# Patient Record
Sex: MALE | Race: WHITE | Employment: OTHER | URBAN - METROPOLITAN AREA
[De-identification: names, ages, dates, MRNs, and addresses within clinical notes are randomized per-mention and may not be internally consistent; named-entity substitution may affect disease eponyms.]

---

## 2020-06-26 ENCOUNTER — HOSPITAL ENCOUNTER (INPATIENT)
Facility: HOSPITAL | Age: 75
LOS: 7 days | Discharge: NURSING, REHAB OR LONG-TERM CARE | DRG: 069 | End: 2020-07-03
Attending: EMERGENCY MEDICINE | Admitting: HOSPITALIST
Payer: MEDICARE

## 2020-06-26 ENCOUNTER — APPOINTMENT (OUTPATIENT)
Dept: CT IMAGING | Facility: HOSPITAL | Age: 75
DRG: 069 | End: 2020-06-26
Attending: EMERGENCY MEDICINE
Payer: MEDICARE

## 2020-06-26 ENCOUNTER — APPOINTMENT (OUTPATIENT)
Dept: GENERAL RADIOLOGY | Facility: HOSPITAL | Age: 75
DRG: 069 | End: 2020-06-26
Attending: INTERNAL MEDICINE
Payer: MEDICARE

## 2020-06-26 ENCOUNTER — APPOINTMENT (OUTPATIENT)
Dept: ULTRASOUND IMAGING | Facility: HOSPITAL | Age: 75
DRG: 069 | End: 2020-06-26
Attending: EMERGENCY MEDICINE
Payer: MEDICARE

## 2020-06-26 DIAGNOSIS — I63.9 ACUTE CVA (CEREBROVASCULAR ACCIDENT) (HCC): Primary | ICD-10-CM

## 2020-06-26 DIAGNOSIS — R77.8 ELEVATED TROPONIN: ICD-10-CM

## 2020-06-26 PROBLEM — R79.89 ELEVATED TROPONIN: Status: ACTIVE | Noted: 2020-06-26

## 2020-06-26 LAB
ANION GAP SERPL CALC-SCNC: 2 MMOL/L (ref 0–18)
ANION GAP SERPL CALC-SCNC: 6 MMOL/L (ref 0–18)
APTT PPP: 32.8 SECONDS (ref 23.2–35.3)
BASOPHILS # BLD AUTO: 0.03 X10(3) UL (ref 0–0.2)
BASOPHILS NFR BLD AUTO: 0.5 %
BUN BLD-MCNC: 24 MG/DL (ref 7–18)
BUN BLD-MCNC: 24 MG/DL (ref 7–18)
BUN/CREAT SERPL: 14.4 (ref 10–20)
BUN/CREAT SERPL: 15.6 (ref 10–20)
CALCIUM BLD-MCNC: 8.8 MG/DL (ref 8.5–10.1)
CALCIUM BLD-MCNC: 9.1 MG/DL (ref 8.5–10.1)
CHLORIDE SERPL-SCNC: 102 MMOL/L (ref 98–112)
CHLORIDE SERPL-SCNC: 103 MMOL/L (ref 98–112)
CHOLEST SMN-MCNC: 75 MG/DL (ref ?–200)
CO2 SERPL-SCNC: 32 MMOL/L (ref 21–32)
CO2 SERPL-SCNC: 36 MMOL/L (ref 21–32)
CREAT BLD-MCNC: 1.54 MG/DL (ref 0.7–1.3)
CREAT BLD-MCNC: 1.67 MG/DL (ref 0.7–1.3)
DEPRECATED RDW RBC AUTO: 64.7 FL (ref 35.1–46.3)
DEPRECATED RDW RBC AUTO: 66.4 FL (ref 35.1–46.3)
EOSINOPHIL # BLD AUTO: 0.37 X10(3) UL (ref 0–0.7)
EOSINOPHIL NFR BLD AUTO: 5.7 %
ERYTHROCYTE [DISTWIDTH] IN BLOOD BY AUTOMATED COUNT: 18.1 % (ref 11–15)
ERYTHROCYTE [DISTWIDTH] IN BLOOD BY AUTOMATED COUNT: 18.4 % (ref 11–15)
EST. AVERAGE GLUCOSE BLD GHB EST-MCNC: 146 MG/DL (ref 68–126)
GLUCOSE BLD-MCNC: 127 MG/DL (ref 70–99)
GLUCOSE BLD-MCNC: 191 MG/DL (ref 70–99)
GLUCOSE BLDC GLUCOMTR-MCNC: 139 MG/DL (ref 70–99)
GLUCOSE BLDC GLUCOMTR-MCNC: 153 MG/DL (ref 70–99)
GLUCOSE BLDC GLUCOMTR-MCNC: 186 MG/DL (ref 70–99)
HAV IGM SER QL: 2.2 MG/DL (ref 1.6–2.6)
HBA1C MFR BLD HPLC: 6.7 % (ref ?–5.7)
HCT VFR BLD AUTO: 38.6 % (ref 39–53)
HCT VFR BLD AUTO: 40.8 % (ref 39–53)
HDLC SERPL-MCNC: 42 MG/DL (ref 40–59)
HGB BLD-MCNC: 12.2 G/DL (ref 13–17.5)
HGB BLD-MCNC: 12.6 G/DL (ref 13–17.5)
IMM GRANULOCYTES # BLD AUTO: 0.01 X10(3) UL (ref 0–1)
IMM GRANULOCYTES NFR BLD: 0.2 %
INR BLD: 1.29 (ref 0.9–1.2)
LDLC SERPL CALC-MCNC: 23 MG/DL (ref ?–100)
LYMPHOCYTES # BLD AUTO: 1.13 X10(3) UL (ref 1–4)
LYMPHOCYTES NFR BLD AUTO: 17.5 %
MCH RBC QN AUTO: 30.2 PG (ref 26–34)
MCH RBC QN AUTO: 30.7 PG (ref 26–34)
MCHC RBC AUTO-ENTMCNC: 30.9 G/DL (ref 31–37)
MCHC RBC AUTO-ENTMCNC: 31.6 G/DL (ref 31–37)
MCV RBC AUTO: 97 FL (ref 80–100)
MCV RBC AUTO: 97.8 FL (ref 80–100)
MONOCYTES # BLD AUTO: 0.61 X10(3) UL (ref 0.1–1)
MONOCYTES NFR BLD AUTO: 9.4 %
NEUTROPHILS # BLD AUTO: 4.32 X10 (3) UL (ref 1.5–7.7)
NEUTROPHILS # BLD AUTO: 4.32 X10(3) UL (ref 1.5–7.7)
NEUTROPHILS NFR BLD AUTO: 66.7 %
NONHDLC SERPL-MCNC: 33 MG/DL (ref ?–130)
OSMOLALITY SERPL CALC.SUM OF ELEC: 298 MOSM/KG (ref 275–295)
OSMOLALITY SERPL CALC.SUM OF ELEC: 299 MOSM/KG (ref 275–295)
PLATELET # BLD AUTO: 130 10(3)UL (ref 150–450)
PLATELET # BLD AUTO: 149 10(3)UL (ref 150–450)
PLATELET MORPHOLOGY: NORMAL
POTASSIUM SERPL-SCNC: 3.8 MMOL/L (ref 3.5–5.1)
POTASSIUM SERPL-SCNC: 4 MMOL/L (ref 3.5–5.1)
PROTHROMBIN TIME: 15.9 SECONDS (ref 11.8–14.5)
RBC # BLD AUTO: 3.98 X10(6)UL (ref 3.8–5.8)
RBC # BLD AUTO: 4.17 X10(6)UL (ref 3.8–5.8)
SARS-COV-2 RNA RESP QL NAA+PROBE: NOT DETECTED
SODIUM SERPL-SCNC: 140 MMOL/L (ref 136–145)
SODIUM SERPL-SCNC: 141 MMOL/L (ref 136–145)
TRIGL SERPL-MCNC: 49 MG/DL (ref 30–149)
TROPONIN I SERPL-MCNC: 0.05 NG/ML (ref ?–0.04)
TROPONIN I SERPL-MCNC: 0.05 NG/ML (ref ?–0.04)
TROPONIN I SERPL-MCNC: 0.07 NG/ML (ref ?–0.04)
VLDLC SERPL CALC-MCNC: 10 MG/DL (ref 0–30)
WBC # BLD AUTO: 6.5 X10(3) UL (ref 4–11)
WBC # BLD AUTO: 7.4 X10(3) UL (ref 4–11)

## 2020-06-26 PROCEDURE — 99223 1ST HOSP IP/OBS HIGH 75: CPT | Performed by: HOSPITALIST

## 2020-06-26 PROCEDURE — 71045 X-RAY EXAM CHEST 1 VIEW: CPT | Performed by: INTERNAL MEDICINE

## 2020-06-26 PROCEDURE — 70496 CT ANGIOGRAPHY HEAD: CPT | Performed by: EMERGENCY MEDICINE

## 2020-06-26 PROCEDURE — 93971 EXTREMITY STUDY: CPT | Performed by: EMERGENCY MEDICINE

## 2020-06-26 PROCEDURE — 70450 CT HEAD/BRAIN W/O DYE: CPT | Performed by: EMERGENCY MEDICINE

## 2020-06-26 PROCEDURE — 70498 CT ANGIOGRAPHY NECK: CPT | Performed by: EMERGENCY MEDICINE

## 2020-06-26 RX ORDER — ATORVASTATIN CALCIUM 40 MG/1
80 TABLET, FILM COATED ORAL NIGHTLY
Status: DISCONTINUED | OUTPATIENT
Start: 2020-06-26 | End: 2020-07-03

## 2020-06-26 RX ORDER — CLOPIDOGREL BISULFATE 75 MG/1
75 TABLET ORAL DAILY
Status: DISCONTINUED | OUTPATIENT
Start: 2020-06-27 | End: 2020-07-03

## 2020-06-26 RX ORDER — GABAPENTIN 300 MG/1
300 CAPSULE ORAL 2 TIMES DAILY
Status: ON HOLD | COMMUNITY
End: 2020-07-03

## 2020-06-26 RX ORDER — ASPIRIN 81 MG/1
324 TABLET, CHEWABLE ORAL ONCE
Status: DISCONTINUED | OUTPATIENT
Start: 2020-06-26 | End: 2020-06-26

## 2020-06-26 RX ORDER — ASPIRIN 300 MG
300 SUPPOSITORY, RECTAL RECTAL DAILY
Status: DISCONTINUED | OUTPATIENT
Start: 2020-06-26 | End: 2020-06-26

## 2020-06-26 RX ORDER — HEPARIN SODIUM 1000 [USP'U]/ML
INJECTION, SOLUTION INTRAVENOUS; SUBCUTANEOUS
Status: DISPENSED
Start: 2020-06-26 | End: 2020-06-27

## 2020-06-26 RX ORDER — HEPARIN SODIUM AND DEXTROSE 10000; 5 [USP'U]/100ML; G/100ML
INJECTION INTRAVENOUS CONTINUOUS
Status: DISCONTINUED | OUTPATIENT
Start: 2020-06-27 | End: 2020-06-29

## 2020-06-26 RX ORDER — METOCLOPRAMIDE HYDROCHLORIDE 5 MG/ML
5 INJECTION INTRAMUSCULAR; INTRAVENOUS EVERY 8 HOURS PRN
Status: DISCONTINUED | OUTPATIENT
Start: 2020-06-26 | End: 2020-07-03

## 2020-06-26 RX ORDER — ACETAMINOPHEN 160 MG/5ML
325 SOLUTION ORAL EVERY 6 HOURS PRN
Status: DISCONTINUED | OUTPATIENT
Start: 2020-06-26 | End: 2020-07-03

## 2020-06-26 RX ORDER — POLYETHYLENE GLYCOL 3350 17 G/17G
17 POWDER, FOR SOLUTION ORAL DAILY PRN
Status: DISCONTINUED | OUTPATIENT
Start: 2020-06-26 | End: 2020-07-03

## 2020-06-26 RX ORDER — GABAPENTIN 600 MG/1
600 TABLET ORAL NIGHTLY
Status: DISCONTINUED | OUTPATIENT
Start: 2020-06-26 | End: 2020-06-30

## 2020-06-26 RX ORDER — ASCORBIC ACID 500 MG
500 TABLET ORAL DAILY
COMMUNITY

## 2020-06-26 RX ORDER — MIRTAZAPINE 15 MG/1
7.5 TABLET, FILM COATED ORAL NIGHTLY
COMMUNITY

## 2020-06-26 RX ORDER — CETIRIZINE HYDROCHLORIDE 10 MG/1
10 TABLET ORAL DAILY
COMMUNITY

## 2020-06-26 RX ORDER — ACETAMINOPHEN 650 MG/1
650 SUPPOSITORY RECTAL EVERY 4 HOURS PRN
Status: DISCONTINUED | OUTPATIENT
Start: 2020-06-26 | End: 2020-07-03

## 2020-06-26 RX ORDER — ACETAMINOPHEN 160 MG/5ML
325 SUSPENSION ORAL EVERY 6 HOURS PRN
COMMUNITY

## 2020-06-26 RX ORDER — CETIRIZINE HYDROCHLORIDE 10 MG/1
10 TABLET ORAL DAILY
Status: DISCONTINUED | OUTPATIENT
Start: 2020-06-26 | End: 2020-07-02

## 2020-06-26 RX ORDER — FUROSEMIDE 20 MG/1
20 TABLET ORAL 2 TIMES DAILY
Status: ON HOLD | COMMUNITY
End: 2020-07-03

## 2020-06-26 RX ORDER — ASCORBIC ACID 500 MG
500 TABLET ORAL DAILY
Status: DISCONTINUED | OUTPATIENT
Start: 2020-06-26 | End: 2020-07-03

## 2020-06-26 RX ORDER — HEPARIN SODIUM 10000 [USP'U]/100ML
INJECTION, SOLUTION INTRAVENOUS
Status: DISPENSED
Start: 2020-06-26 | End: 2020-06-27

## 2020-06-26 RX ORDER — ACETAMINOPHEN 325 MG/1
650 TABLET ORAL EVERY 6 HOURS PRN
Status: DISCONTINUED | OUTPATIENT
Start: 2020-06-26 | End: 2020-07-03

## 2020-06-26 RX ORDER — GUAIFENESIN 100 MG/5ML
1200 SYRUP ORAL 2 TIMES DAILY
COMMUNITY

## 2020-06-26 RX ORDER — ONDANSETRON 2 MG/ML
4 INJECTION INTRAMUSCULAR; INTRAVENOUS EVERY 6 HOURS PRN
Status: DISCONTINUED | OUTPATIENT
Start: 2020-06-26 | End: 2020-07-03

## 2020-06-26 RX ORDER — CHOLECALCIFEROL (VITAMIN D3) 125 MCG
5 CAPSULE ORAL NIGHTLY
COMMUNITY

## 2020-06-26 RX ORDER — MELATONIN
325
Status: DISCONTINUED | OUTPATIENT
Start: 2020-06-27 | End: 2020-07-02

## 2020-06-26 RX ORDER — CLOPIDOGREL BISULFATE 75 MG/1
75 TABLET ORAL DAILY
COMMUNITY

## 2020-06-26 RX ORDER — DOCUSATE SODIUM 100 MG/1
100 CAPSULE, LIQUID FILLED ORAL 2 TIMES DAILY PRN
Status: DISCONTINUED | OUTPATIENT
Start: 2020-06-26 | End: 2020-07-03

## 2020-06-26 RX ORDER — ASPIRIN 325 MG
325 TABLET ORAL DAILY
Status: DISCONTINUED | OUTPATIENT
Start: 2020-06-26 | End: 2020-06-26

## 2020-06-26 RX ORDER — MIRTAZAPINE 15 MG/1
7.5 TABLET, FILM COATED ORAL NIGHTLY
Status: DISCONTINUED | OUTPATIENT
Start: 2020-06-26 | End: 2020-07-03

## 2020-06-26 RX ORDER — ACETAMINOPHEN 325 MG/1
325 TABLET ORAL EVERY 6 HOURS PRN
COMMUNITY

## 2020-06-26 RX ORDER — FUROSEMIDE 40 MG/1
40 TABLET ORAL 2 TIMES DAILY
Status: ON HOLD | COMMUNITY
End: 2020-07-03

## 2020-06-26 RX ORDER — MORPHINE SULFATE 2 MG/ML
INJECTION, SOLUTION INTRAMUSCULAR; INTRAVENOUS
Status: COMPLETED
Start: 2020-06-26 | End: 2020-06-26

## 2020-06-26 RX ORDER — HEPARIN SODIUM AND DEXTROSE 10000; 5 [USP'U]/100ML; G/100ML
1000 INJECTION INTRAVENOUS ONCE
Status: COMPLETED | OUTPATIENT
Start: 2020-06-26 | End: 2020-06-26

## 2020-06-26 RX ORDER — METOCLOPRAMIDE HYDROCHLORIDE 5 MG/ML
10 INJECTION INTRAMUSCULAR; INTRAVENOUS EVERY 8 HOURS PRN
Status: DISCONTINUED | OUTPATIENT
Start: 2020-06-26 | End: 2020-06-27

## 2020-06-26 RX ORDER — HEPARIN SODIUM 1000 [USP'U]/ML
5000 INJECTION, SOLUTION INTRAVENOUS; SUBCUTANEOUS ONCE
Status: COMPLETED | OUTPATIENT
Start: 2020-06-26 | End: 2020-06-26

## 2020-06-26 RX ORDER — PREGABALIN 50 MG/1
100 CAPSULE ORAL 3 TIMES DAILY
Status: DISCONTINUED | OUTPATIENT
Start: 2020-06-26 | End: 2020-06-30

## 2020-06-26 RX ORDER — LABETALOL HYDROCHLORIDE 5 MG/ML
10 INJECTION, SOLUTION INTRAVENOUS EVERY 10 MIN PRN
Status: DISCONTINUED | OUTPATIENT
Start: 2020-06-26 | End: 2020-07-03

## 2020-06-26 RX ORDER — NITROGLYCERIN 0.4 MG/1
TABLET SUBLINGUAL
Status: COMPLETED
Start: 2020-06-26 | End: 2020-06-26

## 2020-06-26 RX ORDER — DOCUSATE SODIUM 100 MG/1
100 CAPSULE, LIQUID FILLED ORAL 2 TIMES DAILY
COMMUNITY

## 2020-06-26 RX ORDER — DEXTROSE MONOHYDRATE 25 G/50ML
50 INJECTION, SOLUTION INTRAVENOUS
Status: DISCONTINUED | OUTPATIENT
Start: 2020-06-26 | End: 2020-07-03

## 2020-06-26 RX ORDER — SODIUM PHOSPHATE, DIBASIC AND SODIUM PHOSPHATE, MONOBASIC 7; 19 G/133ML; G/133ML
1 ENEMA RECTAL ONCE AS NEEDED
Status: DISCONTINUED | OUTPATIENT
Start: 2020-06-26 | End: 2020-07-03

## 2020-06-26 RX ORDER — ACETAMINOPHEN 325 MG/1
650 TABLET ORAL EVERY 4 HOURS PRN
Status: DISCONTINUED | OUTPATIENT
Start: 2020-06-26 | End: 2020-07-03

## 2020-06-26 RX ORDER — MIDODRINE HYDROCHLORIDE 5 MG/1
5 TABLET ORAL 3 TIMES DAILY
Status: DISCONTINUED | OUTPATIENT
Start: 2020-06-26 | End: 2020-07-01

## 2020-06-26 RX ORDER — MELATONIN
325
COMMUNITY

## 2020-06-26 RX ORDER — GABAPENTIN 300 MG/1
300 CAPSULE ORAL
Status: DISCONTINUED | OUTPATIENT
Start: 2020-06-26 | End: 2020-06-30

## 2020-06-26 RX ORDER — ASPIRIN 81 MG/1
81 TABLET ORAL DAILY
Status: DISCONTINUED | OUTPATIENT
Start: 2020-06-27 | End: 2020-06-29

## 2020-06-26 RX ORDER — GABAPENTIN 600 MG/1
600 TABLET ORAL NIGHTLY
Status: ON HOLD | COMMUNITY
End: 2020-07-03

## 2020-06-26 RX ORDER — BISACODYL 10 MG
10 SUPPOSITORY, RECTAL RECTAL
Status: DISCONTINUED | OUTPATIENT
Start: 2020-06-26 | End: 2020-07-03

## 2020-06-26 RX ORDER — PREGABALIN 100 MG/1
100 CAPSULE ORAL 3 TIMES DAILY
Status: ON HOLD | COMMUNITY
End: 2020-07-03

## 2020-06-26 RX ORDER — ATORVASTATIN CALCIUM 80 MG/1
80 TABLET, FILM COATED ORAL NIGHTLY
COMMUNITY

## 2020-06-26 RX ORDER — ACETAMINOPHEN 160 MG/5ML
15 SUSPENSION ORAL EVERY 4 HOURS PRN
Status: ON HOLD | COMMUNITY
End: 2020-07-03

## 2020-06-26 RX ORDER — MIDODRINE HYDROCHLORIDE 5 MG/1
5 TABLET ORAL 3 TIMES DAILY
Status: ON HOLD | COMMUNITY
End: 2020-07-03

## 2020-06-26 RX ORDER — SODIUM CHLORIDE 0.9 % (FLUSH) 0.9 %
3 SYRINGE (ML) INJECTION AS NEEDED
Status: DISCONTINUED | OUTPATIENT
Start: 2020-06-26 | End: 2020-07-03

## 2020-06-26 RX ORDER — DOCUSATE SODIUM 100 MG/1
100 CAPSULE, LIQUID FILLED ORAL 2 TIMES DAILY
Status: DISCONTINUED | OUTPATIENT
Start: 2020-06-26 | End: 2020-07-03

## 2020-06-26 RX ORDER — MONTELUKAST SODIUM 10 MG/1
10 TABLET ORAL DAILY
Status: DISCONTINUED | OUTPATIENT
Start: 2020-06-26 | End: 2020-07-03

## 2020-06-26 RX ORDER — MONTELUKAST SODIUM 10 MG/1
10 TABLET ORAL DAILY
COMMUNITY

## 2020-06-26 RX ORDER — GUAIFENESIN 600 MG
1200 TABLET, EXTENDED RELEASE 12 HR ORAL 2 TIMES DAILY
Status: DISCONTINUED | OUTPATIENT
Start: 2020-06-26 | End: 2020-07-03

## 2020-06-26 NOTE — PROGRESS NOTES
ED charted patient \"passed\" dysphagia screening; however, patient has significant left facial droop an increased slurred speech. Per protocol, patient automatically FAILS dysphagia screening and is NPO until speech assesses patient.   Attending paged and

## 2020-06-26 NOTE — ED PROVIDER NOTES
Patient Seen in: [facility] Emergency Department    History   Patient presents with:  Stroke    Stated Complaint: stroke unknown Kaitlin Rosen[facility]     HPI    68 yo M with PMH DM, HTN, CAD s/p PCI x9 and s/p PPM placement secondary to ?afib, ? RLE/LUE unknown LKW villa scal [facility]  Other systems are as noted in HPI. Constitutional and vital signs reviewed. All other systems reviewed and negative except as noted above. PSFH elements reviewed from today and agreed except as otherwise stated in HPI. GREEN   RAINBOW DRAW GOLD   CBC W/ DIFFERENTIAL     EKG    Rate, intervals and axes as noted on EKG Report.   Rate: 73  Rhythm: paced  Reading: paced 73 without Sgarbossa change without prior for comparison as interpreted by myself            EKG (2hr)    R infarction, or significant atrophy. BRAINSTEM: No edema, hemorrhage, mass, acute infarction, or significant atrophy. CALVARIUM: No apparent fracture, mass, or other significant visible lesion.   SINUSES: Limited views demonstrate no significant mucosal th dissection. Trace left carotid bifurcation atherosclerosis; partial retropharyngeal course of the left internal carotid artery with a mid cervical segment tonsillar loop. VERTEBRAL ARTERIES: RIGHT: No hemodynamically significant stenosis or dissection. findings as above. This report was called immediately at 1328 hours to the emergency room and discussed with Dr. Junaid Bradford.     Remote - PS  Dictated by (CST): Desiree Willoughby MD on 6/26/2020 at 1:16 PM     Finalized by (CST): Desiree Willoughby MD on 6/26/2020 are no discharge medications for this patient.

## 2020-06-26 NOTE — H&P
[facility]    PATIENT'S NAME: Francis Hernandez   ATTENDING PHYSICIAN: Leonarda Jean Baptiste MD   PATIENT ACCOUNT#:   [de-identified]    LOCATION:  [place] [address]  MEDICAL RECORD #:   [id number]       YOB: 1945  ADMISSION DATE: hypertension; hyperlipidemia.   He had history of DVT of left subclavian vein and then another DVT a few months ago to the right lower extremity per the patient's report, currently anticoagulated with Eliquis; history of mild to moderate peripheral arterial reactive. NECK:  Supple. No lymphadenopathy. Trachea midline. Full range of motion. LUNGS:  Clear to auscultation bilaterally. Normal respiratory effort. No intercostal retractions. HEART:  Regular rate and rhythm. S1, S2 auscultated. No murmur.

## 2020-06-27 ENCOUNTER — APPOINTMENT (OUTPATIENT)
Dept: CV DIAGNOSTICS | Facility: HOSPITAL | Age: 75
DRG: 069 | End: 2020-06-27
Attending: HOSPITALIST
Payer: MEDICARE

## 2020-06-27 ENCOUNTER — APPOINTMENT (OUTPATIENT)
Dept: GENERAL RADIOLOGY | Facility: HOSPITAL | Age: 75
DRG: 069 | End: 2020-06-27
Attending: HOSPITALIST
Payer: MEDICARE

## 2020-06-27 LAB
ANION GAP SERPL CALC-SCNC: 4 MMOL/L (ref 0–18)
APTT PPP: 28.1 SECONDS (ref 23.2–35.3)
APTT PPP: 39.5 SECONDS (ref 23.2–35.3)
APTT PPP: 46.8 SECONDS (ref 23.2–35.3)
APTT PPP: 83.2 SECONDS (ref 23.2–35.3)
BASOPHILS # BLD AUTO: 0.03 X10(3) UL (ref 0–0.2)
BASOPHILS NFR BLD AUTO: 0.4 %
BUN BLD-MCNC: 23 MG/DL (ref 7–18)
BUN/CREAT SERPL: 12.8 (ref 10–20)
CALCIUM BLD-MCNC: 9.4 MG/DL (ref 8.5–10.1)
CHLORIDE SERPL-SCNC: 103 MMOL/L (ref 98–112)
CO2 SERPL-SCNC: 37 MMOL/L (ref 21–32)
CREAT BLD-MCNC: 1.8 MG/DL (ref 0.7–1.3)
DEPRECATED RDW RBC AUTO: 66.2 FL (ref 35.1–46.3)
EOSINOPHIL # BLD AUTO: 0.51 X10(3) UL (ref 0–0.7)
EOSINOPHIL NFR BLD AUTO: 7.6 %
ERYTHROCYTE [DISTWIDTH] IN BLOOD BY AUTOMATED COUNT: 18.3 % (ref 11–15)
GLUCOSE BLD-MCNC: 101 MG/DL (ref 70–99)
GLUCOSE BLDC GLUCOMTR-MCNC: 116 MG/DL (ref 70–99)
GLUCOSE BLDC GLUCOMTR-MCNC: 178 MG/DL (ref 70–99)
GLUCOSE BLDC GLUCOMTR-MCNC: 218 MG/DL (ref 70–99)
GLUCOSE BLDC GLUCOMTR-MCNC: 96 MG/DL (ref 70–99)
HCT VFR BLD AUTO: 39.6 % (ref 39–53)
HGB BLD-MCNC: 12.3 G/DL (ref 13–17.5)
IMM GRANULOCYTES # BLD AUTO: 0.02 X10(3) UL (ref 0–1)
IMM GRANULOCYTES NFR BLD: 0.3 %
LYMPHOCYTES # BLD AUTO: 1.43 X10(3) UL (ref 1–4)
LYMPHOCYTES NFR BLD AUTO: 21.3 %
MCH RBC QN AUTO: 30.4 PG (ref 26–34)
MCHC RBC AUTO-ENTMCNC: 31.1 G/DL (ref 31–37)
MCV RBC AUTO: 97.8 FL (ref 80–100)
MONOCYTES # BLD AUTO: 0.7 X10(3) UL (ref 0.1–1)
MONOCYTES NFR BLD AUTO: 10.4 %
NEUTROPHILS # BLD AUTO: 4.01 X10 (3) UL (ref 1.5–7.7)
NEUTROPHILS # BLD AUTO: 4.01 X10(3) UL (ref 1.5–7.7)
NEUTROPHILS NFR BLD AUTO: 60 %
OSMOLALITY SERPL CALC.SUM OF ELEC: 302 MOSM/KG (ref 275–295)
PLATELET # BLD AUTO: 128 10(3)UL (ref 150–450)
PLATELET # BLD AUTO: 128 10(3)UL (ref 150–450)
POTASSIUM SERPL-SCNC: 4.3 MMOL/L (ref 3.5–5.1)
POTASSIUM SERPL-SCNC: 5.9 MMOL/L (ref 3.5–5.1)
RBC # BLD AUTO: 4.05 X10(6)UL (ref 3.8–5.8)
SODIUM SERPL-SCNC: 144 MMOL/L (ref 136–145)
T4 FREE SERPL-MCNC: 0.8 NG/DL (ref 0.8–1.7)
TROPONIN I SERPL-MCNC: 0.06 NG/ML (ref ?–0.04)
TSI SER-ACNC: 7.08 MIU/ML (ref 0.36–3.74)
WBC # BLD AUTO: 6.7 X10(3) UL (ref 4–11)

## 2020-06-27 PROCEDURE — 99223 1ST HOSP IP/OBS HIGH 75: CPT | Performed by: OTHER

## 2020-06-27 PROCEDURE — 99233 SBSQ HOSP IP/OBS HIGH 50: CPT | Performed by: HOSPITALIST

## 2020-06-27 PROCEDURE — 93306 TTE W/DOPPLER COMPLETE: CPT | Performed by: HOSPITALIST

## 2020-06-27 PROCEDURE — 74230 X-RAY XM SWLNG FUNCJ C+: CPT | Performed by: HOSPITALIST

## 2020-06-27 NOTE — PLAN OF CARE
Problem: Patient Centered Care  Goal: Patient preferences are identified and integrated in the patient's plan of care  Description  Interventions:  - What would you like us to know as we care for you?  I live at [facility].   - Provide timely, compl arrhythmias or at baseline  Description  INTERVENTIONS:  - Continuous cardiac monitoring, monitor vital signs, obtain 12 lead EKG if indicated  - Evaluate effectiveness of antiarrhythmic and heart rate control medications as ordered  - Initiate emergency m Encourage small bites of food and small sips of liquid  - Offer pills one at a time, crush or deliver with applesauce as needed  - Discontinue feeding and notify MD (or speech pathologist) if coughing or persistent throat clearing or wet/gurgly vocal quali

## 2020-06-27 NOTE — PLAN OF CARE
Post midnight f/u    C/p Elevated trop  -flat trend  -heparin gtt  -echo pending    Left facial droop- possible CVA  -per neuro     Chronic HFpEF last echo ef 50% mod MR  -repeat echo pending  -continue po lasix home dose     CAD  -Hx PCI LCx 2018   -asa,

## 2020-06-27 NOTE — CONSULTS
CARDIOLOGY SERVICE CONSULT      Patient: Chen Jalloh Date: 2020     : 1945 Attending: Rosa Bearden MD   69year old male Requested by: Dr. Zoya Liang     A/P:  Chest pain/NSTEMI  L facial droop/CVA  Chronic HFpEF, LVEF 50%  Moderate Ayleen. The patient was given SL NTG after which the pain improved and was intermediate in severity. The patient had an angiogram at [place] in 3/2019 that showed patent stents. In 11/2018 he underwent PCI to the mid and distal LCx.      History reviewe systems is otherwise negative.     Medications/Infusions:  Scheduled:   • morphINE sulfate (PF)       • Heparin Sod (Porcine) in D5W       • heparin sodium       • Insulin Aspart Pen  1-7 Units Subcutaneous TID CC   • atorvastatin  80 mg Oral Nightly   • ca [P.O.:236]  Out: -     Intake/Output Summary (Last 24 hours) at 6/26/2020 2327  Last data filed at 6/26/2020 1813  Gross per 24 hour   Intake 236 ml   Output —   Net 236 ml       Physical Assessment:  Gen: alert, comfortable  HEENT: MMM  Neck: supple  Hear diameter.     CONCLUSIONS    Mild left ventricular enlargement.     Anterior septum.     Abnormal motion of the ventricular septum.     Mild right ventricular enlargement.     Moderate to severe mitral regurgitation.     Mild aortic regurgitation.     Smal

## 2020-06-27 NOTE — CM/SW NOTE
[facility]. SW attempted to speak w/ pt but he was on his way to a test at the time. Pt provided permission for SW to contact pt's dtr, Aicha Mills, if she was not working. SW contacted pt's dtr, Aicha Mills.  She confirmed that pt lives at [facility]

## 2020-06-27 NOTE — PROGRESS NOTES
[place] [facility]    Progress Note    [address] Patient Status:  Inpatient    1945 MRN [id number]   Location [facility] 3W/SW Attending Kosta Cantu MD   Hosp Day # 1 PCP [facility]       Subjective:     Pt no hemithorax. Increased opacity in left retrocardiac region which may be related to technical limitations and patient rotation, atelectasis, pneumonia and or scarring. 2. Mild cardiomegaly. 3. Loop recorder. 4. Atherosclerotic calcification aorta.  5. Mary Ellen Bronaugh PM     Finalized by (CST): Kathryn Santiago MD on 6/26/2020 at 1:31 PM          Ekg 12-lead    Result Date: 6/26/2020  ECG Report  Interpretation  -------------------------- Electronic ventricular pacemaker Pacemaker ECG, No further analysis INSUFFICIENT DA status:  Full     >35 minutes spent    Abby Escobar MD  6/27/2020

## 2020-06-27 NOTE — SLP NOTE
ADULT SWALLOWING EVALUATION    ASSESSMENT    ASSESSMENT/OVERALL IMPRESSION:      PPE REQUIRED. THIS SLP WORE GLOVES AND DROPLET MASK. HANDS SANITIZED/WASHED UPON ENTRANCE/EXIT. This BSE was ordered d/t stroke protocol.  Pt currently on solid/thin liqui reflex of solid functional in strength. Mastication rotary slightly uncoordinated with increased effort. Minimal oral retention on the (L) cleared with cued liquid wash.      Pharyngeal response appeared -1-2 sec delayed per hyolaryngeal elevation to comple Admission: Regular; Thin liquids(G-tube \"minimal use\" per MD notes.  )  Precautions: Aspiration    Patient/Family Goals: least restrictive diet    SWALLOWING HISTORY  Current Diet Consistency: Regular; Thin liquids      OBJECTIVE   ORAL MOTOR EXAMINATION

## 2020-06-27 NOTE — OCCUPATIONAL THERAPY NOTE
OT orders received, chart reviewed, yaquelin attempted this AM however pt off floor. Will re-attempt later as able.

## 2020-06-27 NOTE — PHYSICAL THERAPY NOTE
PHYSICAL THERAPY EVALUATION - INPATIENT     Room Number: 329/329-A  Evaluation Date: 6/27/2020  Type of Evaluation: Initial   Physician Order: PT Eval and Treat    Presenting Problem: cva  Reason for Therapy: Mobility Dysfunction and Discharge Planning Medical History  History reviewed. No pertinent past medical history. Past Surgical History  History reviewed. No pertinent surgical history.     HOME SITUATION  Type of Home: Independent living facility   Home Layout: One level                Lives With patient currently have. ..  -   Turning over in bed (including adjusting bedclothes, sheets and blankets)?: None   -   Sitting down on and standing up from a chair with arms (e.g., wheelchair, bedside commode, etc.): A Little   -   Moving from lying on back activity/exercise instructions provided to patient in preparation for discharge.    Goal #5   Current Status    Goal #6    Goal #6  Current Status

## 2020-06-27 NOTE — PROGRESS NOTES
RRT    *See RRT Documentation Record*    Reason the RRT was called: Chest pressure with radiating left arm pain  Assessment of patient leading up to RRT: While assessing patient he began to complain of chest pressure that increasingly became worse, eventua

## 2020-06-27 NOTE — OCCUPATIONAL THERAPY NOTE
OCCUPATIONAL THERAPY EVALUATION - INPATIENT     Room Number: 329/329-A  Evaluation Date: 6/27/2020  Type of Evaluation: Initial  Presenting Problem: CVA    Physician Order: IP Consult to Occupational Therapy  Reason for Therapy: ADL/IADL Dysfunction and [place] techniques;ADL training;IADL training;Functional transfer training;UE strengthening/ROM; Endurance training;Patient/Family education;Patient/Family training;Fine motor coordination activities       OCCUPATIONAL THERAPY MEDICAL/SOCIAL HISTORY     Problem Lis side; L impaired      NEUROLOGICAL FINDINGS  Neurological Findings: Coordination - Finger to Nose;Coordination - Finger Opposition  Coordination - Finger to Nose: Left decreased speed;Left decreased accuracy     Coordination - Finger Opposition: Left decre mod I  Comment:    Patient will complete L UE AROM exercises, 10 reps x 2 sets in all planes      Goals  on: 2020  Frequency: 5x/wk

## 2020-06-27 NOTE — PROGRESS NOTES
Rapid response: ICU nocturnal physician evaluation–    The patient is 16-year-old male with history of coronary artery disease status post coronary artery stenting in 2018 of the LAD and the circumflex.   The patient has a history of cardiomyopathy with eje require nitroglycerin drip, will keep on floor temporarily as long as the patient remains hemodynamically stable, stat troponins and will follow clinically. Cardiology will come into the facility to evaluate the patient promptly.     Brandon Libman, MD

## 2020-06-27 NOTE — SLP NOTE
SPEECH/LANGUAGE/COGNITIVE EVALUATION - INPATIENT    Admission Date: 6/26/2020  Evaluation Date: 06/27/20    Reason for Referral: Stroke protocol    ASSESSMENT & PLAN   ASSESSMENT & IMPRESSION      PPE REQUIRED. THIS SLP WORE GLOVES, GOWN AND DROPLET MASK. use trained speech compensatory strategies (slow rate, exaggerated articulation, pausing between words) during repetition of 5-6 word functional sentences with 90% accuracy within 4 sessions.     In Progress   Goal #4 The patient will use trained speech com

## 2020-06-27 NOTE — CONSULTS
Neurology Inpatient Consult Note    Mehrdad Henderson : 1945   Referring Physician: Dr. Tadeo Dear  HPI:     Mehrdad  is a 69year old male who is being seen in neurologic evaluation.     Patient being seen in evaluation for sensation diminished on left to V1-3, face with left central facial weakness, hearing intact, moderate dysarthria  Motor: Full strength in right arm and leg, left arm and leg 4+/5 power; large amplitude jerky tremor of both arms, more pronounced on left si Hypokinesis of the basalinferior myocardium. Features are     consistent with a pseudonormal left ventricular filling pattern,     with concomitant abnormal relaxation and increased filling     pressure (grade 2 diastolic dysfunction).   2. Aortic valve: goggles were worn by myself, and patient wore mask as well. Very stringent hand hygiene practiced before, during, after visit. All instruments used were thoroughly cleaned.

## 2020-06-27 NOTE — SLP NOTE
ADULT VIDEOFLUOROSCOPIC SWALLOWING STUDY    Admission Date: 6/26/2020  Evaluation Date: 06/27/20  Radiologist: Dr. Farfan Person    PPE REQUIRED. THIS SLP WORE GOWN, GLOVES, GOGGLES, AND DROPLET MASK. HANDS SANITIZED/WASHED UPON ENTRANCE/EXIT.         PLAN: Speec protocol      Family/Patient Goals: least restrictive diet       ASSESSMENT   DYSPHAGIA ASSESSMENT  Test completed in conjunction with Radiologist.  Patient Positioned: Upright;Midline. Patient Viewed: Lateral.  Patient Alertness: Fully alert.   Consistenc of spill-over of retention into airway on this posture. However, Pt is at risk to aspirate the pharyngeal retention, which is noted be be reduced with swallowing compensatory strategies; however not entirely clearing.  Pt understands risks with oral intake [id number] [facility]  #[id number]

## 2020-06-28 LAB
ANION GAP SERPL CALC-SCNC: 4 MMOL/L (ref 0–18)
APTT PPP: 56.2 SECONDS (ref 23.2–35.3)
BASOPHILS # BLD AUTO: 0.02 X10(3) UL (ref 0–0.2)
BASOPHILS NFR BLD AUTO: 0.3 %
BUN BLD-MCNC: 29 MG/DL (ref 7–18)
BUN/CREAT SERPL: 17.2 (ref 10–20)
CALCIUM BLD-MCNC: 8.4 MG/DL (ref 8.5–10.1)
CHLORIDE SERPL-SCNC: 101 MMOL/L (ref 98–112)
CO2 SERPL-SCNC: 35 MMOL/L (ref 21–32)
CREAT BLD-MCNC: 1.69 MG/DL (ref 0.7–1.3)
DEPRECATED RDW RBC AUTO: 65.4 FL (ref 35.1–46.3)
EOSINOPHIL # BLD AUTO: 0.43 X10(3) UL (ref 0–0.7)
EOSINOPHIL NFR BLD AUTO: 6.3 %
ERYTHROCYTE [DISTWIDTH] IN BLOOD BY AUTOMATED COUNT: 18.2 % (ref 11–15)
GLUCOSE BLD-MCNC: 108 MG/DL (ref 70–99)
GLUCOSE BLDC GLUCOMTR-MCNC: 104 MG/DL (ref 70–99)
GLUCOSE BLDC GLUCOMTR-MCNC: 139 MG/DL (ref 70–99)
GLUCOSE BLDC GLUCOMTR-MCNC: 143 MG/DL (ref 70–99)
GLUCOSE BLDC GLUCOMTR-MCNC: 158 MG/DL (ref 70–99)
HCT VFR BLD AUTO: 37.6 % (ref 39–53)
HGB BLD-MCNC: 11.9 G/DL (ref 13–17.5)
IMM GRANULOCYTES # BLD AUTO: 0.02 X10(3) UL (ref 0–1)
IMM GRANULOCYTES NFR BLD: 0.3 %
LYMPHOCYTES # BLD AUTO: 1.4 X10(3) UL (ref 1–4)
LYMPHOCYTES NFR BLD AUTO: 20.5 %
MCH RBC QN AUTO: 31 PG (ref 26–34)
MCHC RBC AUTO-ENTMCNC: 31.6 G/DL (ref 31–37)
MCV RBC AUTO: 97.9 FL (ref 80–100)
MONOCYTES # BLD AUTO: 0.76 X10(3) UL (ref 0.1–1)
MONOCYTES NFR BLD AUTO: 11.1 %
NEUTROPHILS # BLD AUTO: 4.21 X10 (3) UL (ref 1.5–7.7)
NEUTROPHILS # BLD AUTO: 4.21 X10(3) UL (ref 1.5–7.7)
NEUTROPHILS NFR BLD AUTO: 61.5 %
OSMOLALITY SERPL CALC.SUM OF ELEC: 296 MOSM/KG (ref 275–295)
PLATELET # BLD AUTO: 127 10(3)UL (ref 150–450)
PLATELET # BLD AUTO: 127 10(3)UL (ref 150–450)
POTASSIUM SERPL-SCNC: 4.3 MMOL/L (ref 3.5–5.1)
RBC # BLD AUTO: 3.84 X10(6)UL (ref 3.8–5.8)
SODIUM SERPL-SCNC: 140 MMOL/L (ref 136–145)
WBC # BLD AUTO: 6.8 X10(3) UL (ref 4–11)

## 2020-06-28 PROCEDURE — 99232 SBSQ HOSP IP/OBS MODERATE 35: CPT | Performed by: OTHER

## 2020-06-28 PROCEDURE — 99233 SBSQ HOSP IP/OBS HIGH 50: CPT | Performed by: HOSPITALIST

## 2020-06-28 RX ORDER — CHLORHEXIDINE GLUCONATE 4 G/100ML
30 SOLUTION TOPICAL
Status: COMPLETED | OUTPATIENT
Start: 2020-06-29 | End: 2020-06-29

## 2020-06-28 RX ORDER — SODIUM CHLORIDE 9 MG/ML
INJECTION, SOLUTION INTRAVENOUS
Status: COMPLETED | OUTPATIENT
Start: 2020-06-29 | End: 2020-06-29

## 2020-06-28 NOTE — PROGRESS NOTES
[place] [facility] - [facility]    Progress Note    [address] Patient Status:  Inpatient    1945 MRN [id number]   Location [facility] 3W/SW Attending Rod Celestin MD   Hosp Day # 2 PCP Dolores Richter         Assessment and Plan normal respiratory effort  CV: Heart with regular rate and rhythm, Nl C1,L6 , 2/6 systolic ejection murmur  Abd: Abdomen soft, nontender, nondistended, bowel sounds present  Ext:  no clubbing, no cyanosis,no edema  Neuro: Facial droop  Skin: no rashes or l definite evidence of aspiration. Please see speech pathology notes for further details.     Dictated by (CST): Simon Zhang MD on 6/27/2020 at 11:50 AM     Finalized by (CST): Simon Zhang MD on 6/27/2020 at 11:51 AM          Xr Chest Ap Portable airways infection/aspiration. 6. Dilatation of the main pulmonary artery trunk may relate to underlying pulmonary hypertension. 7. Lesser incidental findings as above.   This report was called immediately at 1328 hours to the emergency room and discussed wi

## 2020-06-28 NOTE — PROGRESS NOTES
[facility]    Progress Note    Roberth Rhodes Patient Status:  Inpatient    1945 MRN [id number]   Location [facility] 3W/SW Attending Roxann Serrano, [address] [facility] Se Day # 2 PCP Bertha Cleary       Subjective:     Yuliya Erazo and patient rotation, atelectasis, pneumonia and or scarring. 2. Mild cardiomegaly. 3. Loop recorder. 4. Atherosclerotic calcification aorta. 5. Partially visualized G-tube.     Dictated by (CST): Nohelia Avelar MD on 6/27/2020 at 9:16 AM     Finalized by ( Date: 6/26/2020  ECG Report  Interpretation  -------------------------- Electronic ventricular pacemaker Pacemaker ECG, No further analysis INSUFFICIENT DATA When compared with ECG of 06/26/2020 14:54:44 No significant changes have occurred Electronically Michaelle Chen MD  6/28/2020

## 2020-06-28 NOTE — PHYSICAL THERAPY NOTE
PHYSICAL THERAPY TREATMENT NOTE - INPATIENT     Room Number: 329/329-A       Presenting Problem: cva    Problem List  Principal Problem:    Acute CVA (cerebrovascular accident) [facility])  Active Problems:    Elevated troponin      PHYSICAL THERAPY ASSESSMENT Static Sitting: Good  Dynamic Sitting: Fair +           Static Standing: Fair  Dynamic Standing: Fair -    ACTIVITY TOLERANCE                         O2 WALK  SPO2 on Room Air at Rest: 96  SPO2 Ambulation on Room A 150 feet with assist device: walker - rolling at assistance level: supervision   Goal #3   Current Status 150 feet with RW SBA   Goal #4    Goal #4   Current Status    Goal #5 Patient to demonstrate independence with home activity/exercise instructions pro

## 2020-06-28 NOTE — PLAN OF CARE
No acute neuro changes noted. Heparin gtt infusing - pending PTT results.  Brain MRI in AM.     Problem: Patient Centered Care  Goal: Patient preferences are identified and integrated in the patient's plan of care  Description  Interventions:  - What would edema, trend weights  Outcome: Progressing  Goal: Absence of cardiac arrhythmias or at baseline  Description  INTERVENTIONS:  - Continuous cardiac monitoring, monitor vital signs, obtain 12 lead EKG if indicated  - Evaluate effectiveness of antiarrhythmic small sips of liquid  - Offer pills one at a time, crush or deliver with applesauce as needed  - Discontinue feeding and notify MD (or speech pathologist) if coughing or persistent throat clearing or wet/gurgly vocal quality is noted  Outcome: Progressing

## 2020-06-28 NOTE — PLAN OF CARE
Patient is a/ox4. 1L O2 nasal cannula. Up with standby assist with the walker. Heparin drip at 11.5 ml/hr. Daily NIH. Accucheck ACHS. Plan is for a brain MRI and cath tomorrow. Consent signed. Will continue to monitor.     Problem: Patient Centered Care  Go temperature  - Assess for signs of decreased coronary artery perfusion - ex.  Angina  - Evaluate fluid balance, assess for edema, trend weights  Outcome: Progressing  Goal: Absence of cardiac arrhythmias or at baseline  Description  INTERVENTIONS:  - Contin Progressing     Problem: Impaired Swallowing  Goal: Minimize aspiration risk  Description  Interventions:  - Patient should be alert and upright for all feedings (90 degrees preferred)  - Offer food and liquids at a slow rate  - No straws  - Encourage smal

## 2020-06-28 NOTE — SLP NOTE
SPEECH DAILY NOTE - INPATIENT    ASSESSMENT & PLAN   ASSESSMENT  Pt seen for meal assess/dysphagia therapy to monitor po tolerance of recommended diet and ensure adherence to aspiration precautions. Pt alert and sitting upright in chair.   Pt agreeable to success  In Progress   Goal #3 The patient will complete dysphagia exercises with 90% accuracy within 3 sessions with mild cues.     Did not target this session  In Progress       FOLLOW UP  Follow Up Needed: Yes  SLP Follow-up Date: 06/29/20  Number of Vis

## 2020-06-28 NOTE — PROGRESS NOTES
Neurology Inpatient Follow-up Note      HPI:     Patient being seen in follow up. Interval notes and workup reviewed.     Patient feels the same today continues to maintain that left-sided weakness is baseline to some degree, from previous strokes, but sym 70     –Patient on full anticoagulation     –delirium precautions:  Minimize sedating medications, increase activity / up in chair during day, minimize interruptions at night, re-orienting cues (e.g. clocks, calendars), familiar staff when possible     [facility]

## 2020-06-29 ENCOUNTER — APPOINTMENT (OUTPATIENT)
Dept: INTERVENTIONAL RADIOLOGY/VASCULAR | Facility: HOSPITAL | Age: 75
DRG: 069 | End: 2020-06-29
Attending: NURSE PRACTITIONER
Payer: MEDICARE

## 2020-06-29 LAB
ANION GAP SERPL CALC-SCNC: 6 MMOL/L (ref 0–18)
APTT PPP: 58.5 SECONDS (ref 23.2–35.3)
BASOPHILS # BLD AUTO: 0.03 X10(3) UL (ref 0–0.2)
BASOPHILS NFR BLD AUTO: 0.4 %
BUN BLD-MCNC: 42 MG/DL (ref 7–18)
BUN/CREAT SERPL: 19.4 (ref 10–20)
CALCIUM BLD-MCNC: 8.9 MG/DL (ref 8.5–10.1)
CHLORIDE SERPL-SCNC: 102 MMOL/L (ref 98–112)
CO2 SERPL-SCNC: 30 MMOL/L (ref 21–32)
CREAT BLD-MCNC: 2.17 MG/DL (ref 0.7–1.3)
DEPRECATED RDW RBC AUTO: 64 FL (ref 35.1–46.3)
EOSINOPHIL # BLD AUTO: 0.25 X10(3) UL (ref 0–0.7)
EOSINOPHIL NFR BLD AUTO: 3.7 %
ERYTHROCYTE [DISTWIDTH] IN BLOOD BY AUTOMATED COUNT: 18.3 % (ref 11–15)
GLUCOSE BLD-MCNC: 151 MG/DL (ref 70–99)
GLUCOSE BLDC GLUCOMTR-MCNC: 106 MG/DL (ref 70–99)
GLUCOSE BLDC GLUCOMTR-MCNC: 118 MG/DL (ref 70–99)
GLUCOSE BLDC GLUCOMTR-MCNC: 164 MG/DL (ref 70–99)
GLUCOSE BLDC GLUCOMTR-MCNC: 95 MG/DL (ref 70–99)
HCT VFR BLD AUTO: 38.5 % (ref 39–53)
HGB BLD-MCNC: 12.3 G/DL (ref 13–17.5)
IMM GRANULOCYTES # BLD AUTO: 0.01 X10(3) UL (ref 0–1)
IMM GRANULOCYTES NFR BLD: 0.1 %
LYMPHOCYTES # BLD AUTO: 1.59 X10(3) UL (ref 1–4)
LYMPHOCYTES NFR BLD AUTO: 23.5 %
MCH RBC QN AUTO: 30.5 PG (ref 26–34)
MCHC RBC AUTO-ENTMCNC: 31.9 G/DL (ref 31–37)
MCV RBC AUTO: 95.5 FL (ref 80–100)
MONOCYTES # BLD AUTO: 0.74 X10(3) UL (ref 0.1–1)
MONOCYTES NFR BLD AUTO: 10.9 %
NEUTROPHILS # BLD AUTO: 4.14 X10 (3) UL (ref 1.5–7.7)
NEUTROPHILS # BLD AUTO: 4.14 X10(3) UL (ref 1.5–7.7)
NEUTROPHILS NFR BLD AUTO: 61.4 %
OSMOLALITY SERPL CALC.SUM OF ELEC: 299 MOSM/KG (ref 275–295)
PLATELET # BLD AUTO: 130 10(3)UL (ref 150–450)
PLATELET # BLD AUTO: 153 10(3)UL (ref 150–450)
RBC # BLD AUTO: 4.03 X10(6)UL (ref 3.8–5.8)
SODIUM SERPL-SCNC: 138 MMOL/L (ref 136–145)
WBC # BLD AUTO: 6.8 X10(3) UL (ref 4–11)

## 2020-06-29 PROCEDURE — B2111ZZ FLUOROSCOPY OF MULTIPLE CORONARY ARTERIES USING LOW OSMOLAR CONTRAST: ICD-10-PCS | Performed by: INTERNAL MEDICINE

## 2020-06-29 PROCEDURE — 99232 SBSQ HOSP IP/OBS MODERATE 35: CPT | Performed by: OTHER

## 2020-06-29 PROCEDURE — 99233 SBSQ HOSP IP/OBS HIGH 50: CPT | Performed by: HOSPITALIST

## 2020-06-29 PROCEDURE — 4A023N7 MEASUREMENT OF CARDIAC SAMPLING AND PRESSURE, LEFT HEART, PERCUTANEOUS APPROACH: ICD-10-PCS | Performed by: INTERNAL MEDICINE

## 2020-06-29 RX ORDER — HYDROMORPHONE HYDROCHLORIDE 1 MG/ML
0.5 INJECTION, SOLUTION INTRAMUSCULAR; INTRAVENOUS; SUBCUTANEOUS EVERY 2 HOUR PRN
Status: DISCONTINUED | OUTPATIENT
Start: 2020-06-29 | End: 2020-07-03

## 2020-06-29 RX ORDER — ASPIRIN 81 MG/1
81 TABLET ORAL DAILY
Status: DISCONTINUED | OUTPATIENT
Start: 2020-06-29 | End: 2020-06-29

## 2020-06-29 RX ORDER — CLOPIDOGREL BISULFATE 75 MG/1
75 TABLET ORAL DAILY
Status: DISCONTINUED | OUTPATIENT
Start: 2020-06-29 | End: 2020-06-29

## 2020-06-29 RX ORDER — LIDOCAINE HYDROCHLORIDE 20 MG/ML
INJECTION, SOLUTION EPIDURAL; INFILTRATION; INTRACAUDAL; PERINEURAL
Status: COMPLETED
Start: 2020-06-29 | End: 2020-06-29

## 2020-06-29 RX ORDER — MIDAZOLAM HYDROCHLORIDE 1 MG/ML
INJECTION INTRAMUSCULAR; INTRAVENOUS
Status: COMPLETED
Start: 2020-06-29 | End: 2020-06-29

## 2020-06-29 RX ORDER — HYDROMORPHONE HYDROCHLORIDE 1 MG/ML
1 INJECTION, SOLUTION INTRAMUSCULAR; INTRAVENOUS; SUBCUTANEOUS ONCE
Status: COMPLETED | OUTPATIENT
Start: 2020-06-29 | End: 2020-06-29

## 2020-06-29 RX ORDER — HEPARIN SODIUM 5000 [USP'U]/ML
5000 INJECTION, SOLUTION INTRAVENOUS; SUBCUTANEOUS EVERY 8 HOURS SCHEDULED
Status: DISCONTINUED | OUTPATIENT
Start: 2020-06-29 | End: 2020-06-29

## 2020-06-29 NOTE — PROGRESS NOTES
Neurology Inpatient Follow-up Note      HPI:     Patient being seen in follow up. Interval notes and workup reviewed. Feeling about the same. Left side still weak. Tremors at the same.       Past Medical Hisotory:  Reviewed    Medications:  Reviewed possible     –Continue home dose carbidopa–levodopa 25–100, 2 tablets 4 times a day     –Pending MRI result, patient can be discharged today with follow-up with his outpatient neurologist      Neurology service will continue to follow.   Please page with an

## 2020-06-29 NOTE — PLAN OF CARE
Problem: Patient Centered Care  Goal: Patient preferences are identified and integrated in the patient's plan of care  Description  Interventions:  - What would you like us to know as we care for you?  I live at [facility].   - Provide timely, compl baseline  Description  INTERVENTIONS:  - Continuous cardiac monitoring, monitor vital signs, obtain 12 lead EKG if indicated  - Evaluate effectiveness of antiarrhythmic and heart rate control medications as ordered  - Initiate emergency measures for life t needed  - Discontinue feeding and notify MD (or speech pathologist) if coughing or persistent throat clearing or wet/gurgly vocal quality is noted  Outcome: Progressing     Problem: Impaired Communication  Goal: Patient will achieve maximal communication p

## 2020-06-29 NOTE — CONSULTS
PHYSICAL MEDICINE AND REHABILITATION CONSULTATION       Location [facility] 3W/SW Attending Christine Sal MD   Hosp Day # 3 PCP [address]     Patient Identification  Liset Martinez is a 69year old male.   :  1945  Admit Praneeth injection 100 mL, 100 mL, Injection, ONCE PRN  [COMPLETED] 0.9% NaCl infusion, , Intravenous, On Call  [COMPLETED] Chlorhexidine Gluconate (HIBICLENS) 4 % liquid 30 mL, 30 mL, Topical, On Call  [COMPLETED] iopamidol (ISOVUE-M) 76 % injection 100 mL, 100 mL tab 75 mg, 75 mg, Oral, Daily  docusate sodium (COLACE) cap 100 mg, 100 mg, Oral, BID  ferrous sulfate EC tab 325 mg, 325 mg, Oral, Daily with breakfast  furosemide (LASIX) tab 60 mg, 60 mg, Oral, BID  gabapentin (NEURONTIN) cap 300 mg, 300 mg, Oral, BID A diastolic left ventricular dysfunction with ejection fraction of 45%, last echocardiogram on record September 2019; history of atrial perico dysfunction with bradycardia and pauses, status post permanent pacemaker; benign prostatic hypertrophy; generalized functional Status:   Patient able to perform bed mobility, transfers, and ambulation with stand by assist using rolling walker. Increased gait distance noted with patient requiring cues to maintain stance within walker.  SpO2 84% on room air after ambulatio facial droop.  Dysarthria of speech, sensation intact to LT in BUE/BLE;   Data Review:    Lab Results   Component Value Date    .0 06/29/2020    PTT 58.5 06/29/2020    PGLU 118 06/29/2020             ASSESSMENT:    Deficits of self care and mobility

## 2020-06-29 NOTE — PAYOR COMM NOTE
--------------  ADMISSION REVIEW     Payor: [id number] #:  [id number]  Authorization Number: [id number]    Admit date: 6/26/20  Admit time: 56       Admitting Physician: Aric Ruvalcaba MD  Attending Physician:  Judy Jean extremity with 5/5 strength proximally and distally. ED Course     EKG    Rate, intervals and axes as noted on EKG Report.   Rate: 73  Rhythm: paced  Reading: paced 73 without Sgarbossa change without prior for comparison as interpreted by myself several days of left-sided facial droop associated with left arm/leg weakness, patient neither TPA nor neuro interventional candidate given duration of symptoms.   CT head nonacute, aspirin given prior to arrival.  CTA obtained for possible dissection in se pain/NSTEMI  L facial droop/CVA  Chronic HFpEF, LVEF 50%  Moderate to severe MR  CAD s/p PCI LCx  Symptomatic bradycardia s/p PPM  HTN  HL  DM2  CKD  PVD  DVT     Recommendations:  -Continue to trend troponins until downtrending or flat  -Given history and equal, round, and reactive to light; extraocular movements intact; facial sensation diminished on left to V1-3, face with left central facial weakness, hearing intact, moderate dysarthria  Motor: Full strength in right arm and leg, left arm and leg 4+/5 po 11.9  HCT 37.6  .0      CARDIOLOGY -     Assessment and Plan:   C/p Elevated trop  -Although troponin levels are flat history suggest increasing exertional chest pain and echo with EF of 40% with moderate MR and TR.   Angiogram in the past showed pat pain  NSTEMI  Hx of CAD and stents  - cardiology on consult  - probable cath tomorrow  - trops flat now  - cont heparin drip  - cont aspirin and plavix  - cont lipitor  - echo shows EF 40% with areas of hypokinesis    6/29/20 -   06/29/20 0858 98 °F (36.7

## 2020-06-29 NOTE — PROGRESS NOTES
[facility]    Progress Note    [address] Patient Status:  Inpatient    1945 MRN [id number]   Location [facility] 3W/SW Attending Carmelina Pappas MD   Hosp Day # 3 PCP [facility]       Subjective:     No ne thrombosis normally on Eliquis. - now off of heparin drip  - Eliquis on hold  - start prophylactic dose heparin now.     Will discuss anticoag with neurology.       Hx of HTN  - cont lasix     Hx of DM2  - cont SQ insulin regimen     Hx of HL  - lipitor

## 2020-06-29 NOTE — PROGRESS NOTES
Spoke with MRI techs and per [employer], patient's own nerve stimulator controller is needed at bedside. [employer] representative was contacted by MRI techs to come and prep pacemaker for MRI of the brain and also address nerve stimulator prior to MRI.

## 2020-06-29 NOTE — PLAN OF CARE
Patient scheduled for cardiac cath today. Consent is signed, and patient is prepped for procedure. Heparin gtt infusing as ordered. VS stable. C/o same mild chest pain. Neuro assessment unchanged. Fall precautions. Call light within reach.  Will continue to hematoma  - Assess quality of pulses, skin color and temperature  - Assess for signs of decreased coronary artery perfusion - ex.  Angina  - Evaluate fluid balance, assess for edema, trend weights  Outcome: Progressing  Goal: Absence of cardiac arrhythmias risk  Description  Interventions:  - Patient should be alert and upright for all feedings (90 degrees preferred)  - Offer food and liquids at a slow rate  - No straws  - Encourage small bites of food and small sips of liquid  - Offer pills one at a time, c

## 2020-06-29 NOTE — PROCEDURES
Cardiologist: Ciera Lake MD  Primary Proceduralist: Ciera Lake MD  Procedure Performed: [facility] and [facility]  Date of Procedure: 6/29/2020   Indication: Elevated troponin    Summary of findings: Moderate CAD with widely patent stent and proximal LAD.       P

## 2020-06-29 NOTE — SLP NOTE
SLP attempt this PM. Per RN Kamilla Prieto, pt is out of room for procedure. SLP to f/u as pt available/appropriate and/or as schedule allows. Please contact SLP with any questions, concerns, and/or change in status. Thank you. Stacey Angelo  Spe

## 2020-06-29 NOTE — IVS NOTE
S/p LHC with right groin access. Patient c/o left sided facial and neck pain before and after the procedure. Procedure hand off report given to Edu Winslow RN. Procedure site remains dry and intact with no signs and symptoms of bleeding and hematoma.  Bedrest m

## 2020-06-30 ENCOUNTER — APPOINTMENT (OUTPATIENT)
Dept: ULTRASOUND IMAGING | Facility: HOSPITAL | Age: 75
DRG: 069 | End: 2020-06-30
Attending: RADIOLOGY
Payer: MEDICARE

## 2020-06-30 ENCOUNTER — APPOINTMENT (OUTPATIENT)
Dept: MRI IMAGING | Facility: HOSPITAL | Age: 75
DRG: 069 | End: 2020-06-30
Attending: Other
Payer: MEDICARE

## 2020-06-30 ENCOUNTER — APPOINTMENT (OUTPATIENT)
Dept: GENERAL RADIOLOGY | Facility: HOSPITAL | Age: 75
DRG: 069 | End: 2020-06-30
Attending: HOSPITALIST
Payer: MEDICARE

## 2020-06-30 LAB
ANION GAP SERPL CALC-SCNC: 7 MMOL/L (ref 0–18)
APTT PPP: 32 SECONDS (ref 23.2–35.3)
BASOPHILS # BLD AUTO: 0.03 X10(3) UL (ref 0–0.2)
BASOPHILS NFR BLD AUTO: 0.4 %
BUN BLD-MCNC: 44 MG/DL (ref 7–18)
BUN/CREAT SERPL: 21.2 (ref 10–20)
CALCIUM BLD-MCNC: 9.3 MG/DL (ref 8.5–10.1)
CHLORIDE SERPL-SCNC: 100 MMOL/L (ref 98–112)
CO2 SERPL-SCNC: 33 MMOL/L (ref 21–32)
CREAT BLD-MCNC: 2.08 MG/DL (ref 0.7–1.3)
DEPRECATED RDW RBC AUTO: 64.5 FL (ref 35.1–46.3)
EOSINOPHIL # BLD AUTO: 0.16 X10(3) UL (ref 0–0.7)
EOSINOPHIL NFR BLD AUTO: 2.2 %
ERYTHROCYTE [DISTWIDTH] IN BLOOD BY AUTOMATED COUNT: 18.1 % (ref 11–15)
GLUCOSE BLD-MCNC: 115 MG/DL (ref 70–99)
GLUCOSE BLDC GLUCOMTR-MCNC: 146 MG/DL (ref 70–99)
GLUCOSE BLDC GLUCOMTR-MCNC: 178 MG/DL (ref 70–99)
GLUCOSE BLDC GLUCOMTR-MCNC: 185 MG/DL (ref 70–99)
GLUCOSE BLDC GLUCOMTR-MCNC: 221 MG/DL (ref 70–99)
HCT VFR BLD AUTO: 37.6 % (ref 39–53)
HGB BLD-MCNC: 11.9 G/DL (ref 13–17.5)
IMM GRANULOCYTES # BLD AUTO: 0.02 X10(3) UL (ref 0–1)
IMM GRANULOCYTES NFR BLD: 0.3 %
LYMPHOCYTES # BLD AUTO: 1.46 X10(3) UL (ref 1–4)
LYMPHOCYTES NFR BLD AUTO: 19.8 %
MCH RBC QN AUTO: 30.3 PG (ref 26–34)
MCHC RBC AUTO-ENTMCNC: 31.6 G/DL (ref 31–37)
MCV RBC AUTO: 95.7 FL (ref 80–100)
MONOCYTES # BLD AUTO: 0.84 X10(3) UL (ref 0.1–1)
MONOCYTES NFR BLD AUTO: 11.4 %
NEUTROPHILS # BLD AUTO: 4.88 X10 (3) UL (ref 1.5–7.7)
NEUTROPHILS # BLD AUTO: 4.88 X10(3) UL (ref 1.5–7.7)
NEUTROPHILS NFR BLD AUTO: 65.9 %
OSMOLALITY SERPL CALC.SUM OF ELEC: 302 MOSM/KG (ref 275–295)
PLATELET # BLD AUTO: 150 10(3)UL (ref 150–450)
POTASSIUM SERPL-SCNC: 4.4 MMOL/L (ref 3.5–5.1)
POTASSIUM SERPL-SCNC: 4.6 MMOL/L (ref 3.5–5.1)
RBC # BLD AUTO: 3.93 X10(6)UL (ref 3.8–5.8)
SODIUM SERPL-SCNC: 140 MMOL/L (ref 136–145)
WBC # BLD AUTO: 7.4 X10(3) UL (ref 4–11)

## 2020-06-30 PROCEDURE — 70551 MRI BRAIN STEM W/O DYE: CPT | Performed by: OTHER

## 2020-06-30 PROCEDURE — 93926 LOWER EXTREMITY STUDY: CPT | Performed by: RADIOLOGY

## 2020-06-30 PROCEDURE — 71045 X-RAY EXAM CHEST 1 VIEW: CPT | Performed by: HOSPITALIST

## 2020-06-30 PROCEDURE — 99233 SBSQ HOSP IP/OBS HIGH 50: CPT | Performed by: HOSPITALIST

## 2020-06-30 RX ORDER — PREGABALIN 75 MG/1
150 CAPSULE ORAL 3 TIMES DAILY
Status: DISCONTINUED | OUTPATIENT
Start: 2020-07-01 | End: 2020-07-03

## 2020-06-30 RX ORDER — GABAPENTIN 100 MG/1
100 CAPSULE ORAL 3 TIMES DAILY
Status: DISCONTINUED | OUTPATIENT
Start: 2020-06-30 | End: 2020-07-03

## 2020-06-30 RX ORDER — HYDROCODONE BITARTRATE AND ACETAMINOPHEN 5; 325 MG/1; MG/1
1 TABLET ORAL EVERY 6 HOURS PRN
Status: DISCONTINUED | OUTPATIENT
Start: 2020-06-30 | End: 2020-07-03

## 2020-06-30 NOTE — PROGRESS NOTES
[place] [facility]    Progress Note    [address] Ojeda Patient Status:  Inpatient    1945 MRN [id number]   Location [facility] 2W/SW Attending Aung Pastrana MD   Hosp Day # 4 PCP [facility]       Subjective:     No bl aspirin per cards. - cont lipitor  - echo shows EF 40% with areas of hypokinesis  - pt started on low dose metoprolol    Right groin site bleeding and hematoma last night. Eliquis held. No pseudoaneurysm on u/s today. Hgb stable.   - ok to resume Eliqui

## 2020-06-30 NOTE — CM/SW NOTE
Noted that PMR and PT/OT are recommending acute rehab at discharge. SW sent acute rehab referrals via [employer]. Awaiting on who is able to accept pt for rehab. Will need to provide daughter a list of accepted facilities when able.

## 2020-06-30 NOTE — PROGRESS NOTES
Neurology Inpatient interval note    Per discussion with patient and with Dr. Nessa Jimenez, patient will be seeing Dr. Nessa Jimenez as an inpatient from here on, as he is his outpatient neurologist; defer further neurologic recommendations to him.   Please page with an

## 2020-06-30 NOTE — PAYOR COMM NOTE
--------------  CONTINUED STAY REVIEW    Payor: [id number] [place] [address] #:  [id number]  Authorization Number: [id number]    Admit date: 6/26/20  Admit time: 56    Admitting Physician: Antonio Whitehead MD  Attending Physician:  Griselda Bee Moderate CAD with widely patent stent and proximal LAD    Post procedure findings:  1) Left Ventriculography at 30 degrees BENITEZ: Not performed, renal insufficiency  2) Hemodynamics: LVEDP: 16 mmHg, no gradient across aortic valve  3) Coronaries:  · Left albert consider in the future if blood pressure allows and renal function normalizes        TO MRI THIS AFTERNOON         PLEASE PROVIDE INPATIENT AUTHORIZATION. THANK YOU.

## 2020-06-30 NOTE — PROGRESS NOTES
Patient to transfer from PCCU 216  to 303. Report given to [facility]. Medications, labs, plan of care reviewed. All belongings with patient. Nursing provided patient with current list of medications; reviewed purpose and side effects of medications.  No fu

## 2020-06-30 NOTE — PHYSICAL THERAPY NOTE
Spoke with ALEJANDRO Dias who reports patient t/f to CCU due to new hematoma in right groin after cath when coughing, has been better today but basically in bed. She reports he is having an MRI shortly and requesting we hold for now and check back tomorrow.

## 2020-06-30 NOTE — IMAGING NOTE
[address] Quin[address] Yanez, [employer] pacemaker rep present. Pacemaker turned to MRI safe mode. Monitor applied. 1535  VS  HR 80  /73  SPO 93% MRI SCAN BEGINS    1545 VS  HR 81  /73  SPO2 93%    1555  VS  HR 80  /77  SPO2 95%  MRI SCAN COMPLETE.

## 2020-06-30 NOTE — PROGRESS NOTES
[place] [facility]    Progress Note    125 Common[address] Ojeda Patient Status:  Inpatient    1945 MRN [id number]   Location [facility] 2W/SW Attending Alli Levy MD   Hosp Day # 4 PCP Syed Kearney         Assessment and Plan intake/output data recorded.      Exam  Gen: No acute distress,   Psych:alert and oriented x3,   HEENT: normocephalic  Neck:supple,no JVD  Pulm: Lungs clear, normal respiratory effort  CV: Heart with regular rate and rhythm, Nl S1,S2 ,no S3 or murmur  Abd:

## 2020-06-30 NOTE — OCCUPATIONAL THERAPY NOTE
Patient t/f to CCU with new hematoma to R groin after cath lab from coughing (RRT 6/29); patient is having an MRI shortly and limiting OOB activities; will follow up with patient 7/1

## 2020-06-30 NOTE — PLAN OF CARE
Patient stable. Left sided weakness and facial droop persist with numbness and tingling. Patient had MRI- awaiting result. Right groin cath site intact, slightly swollen. No bleeding. Pulses palpable. Right Foot warm.  Patient c/o moderate pain to right jayshree to optimize hemodynamic stability  - Monitor arterial and/or venous puncture sites for bleeding and/or hematoma  - Assess quality of pulses, skin color and temperature  - Assess for signs of decreased coronary artery perfusion - ex.  Angina  - Evaluate flui tolerated functional activity level and precautions during self-care  - Encourage patient to incorporate impaired side during daily activities to promote function  Outcome: Progressing     Problem: Impaired Swallowing  Goal: Minimize aspiration risk  Descr Progressing

## 2020-06-30 NOTE — PROGRESS NOTES
Dr Ger Thomas / ICU on call   Responded to RRT   RRT called for acute and severe right groin pain developed while the patient was up to the chair after he coughed,   Patient had cardiac cath earlier today from the right groin  Patient now with a bulging hemato

## 2020-06-30 NOTE — PLAN OF CARE
RRT    *See RRT Documentation Record*    Reason the RRT was called: Rt.groin pain/swelling(pls.see RRT sheet for more details)  Assessment of patient leading up to RRT:Pt.was sitting on the recliner and c/o of severe  burning pain on the rt.groin after cou

## 2020-07-01 ENCOUNTER — APPOINTMENT (OUTPATIENT)
Dept: ULTRASOUND IMAGING | Facility: HOSPITAL | Age: 75
DRG: 069 | End: 2020-07-01
Attending: HOSPITALIST
Payer: MEDICARE

## 2020-07-01 ENCOUNTER — APPOINTMENT (OUTPATIENT)
Dept: CT IMAGING | Facility: HOSPITAL | Age: 75
DRG: 069 | End: 2020-07-01
Attending: HOSPITALIST
Payer: MEDICARE

## 2020-07-01 LAB
ANION GAP SERPL CALC-SCNC: 9 MMOL/L (ref 0–18)
BACTERIA UR QL AUTO: NEGATIVE /HPF
BASOPHILS # BLD: 0 X10(3) UL (ref 0–0.2)
BASOPHILS NFR BLD: 0 %
BILIRUB UR QL: NEGATIVE
BUN BLD-MCNC: 54 MG/DL (ref 7–18)
BUN/CREAT SERPL: 19.9 (ref 10–20)
CALCIUM BLD-MCNC: 8.7 MG/DL (ref 8.5–10.1)
CHLORIDE SERPL-SCNC: 98 MMOL/L (ref 98–112)
CO2 SERPL-SCNC: 32 MMOL/L (ref 21–32)
COLOR UR: YELLOW
CREAT BLD-MCNC: 2.71 MG/DL (ref 0.7–1.3)
CREAT UR-SCNC: 133 MG/DL
CREAT UR-SCNC: 133 MG/DL
DEPRECATED HBV CORE AB SER IA-ACNC: 82.4 NG/ML (ref 30–530)
DEPRECATED RDW RBC AUTO: 64.4 FL (ref 35.1–46.3)
EOSINOPHIL # BLD: 0 X10(3) UL (ref 0–0.7)
EOSINOPHIL NFR BLD: 0 %
ERYTHROCYTE [DISTWIDTH] IN BLOOD BY AUTOMATED COUNT: 18.4 % (ref 11–15)
GLUCOSE BLD-MCNC: 151 MG/DL (ref 70–99)
GLUCOSE BLDC GLUCOMTR-MCNC: 173 MG/DL (ref 70–99)
GLUCOSE BLDC GLUCOMTR-MCNC: 186 MG/DL (ref 70–99)
GLUCOSE BLDC GLUCOMTR-MCNC: 190 MG/DL (ref 70–99)
GLUCOSE BLDC GLUCOMTR-MCNC: 237 MG/DL (ref 70–99)
GLUCOSE UR-MCNC: NEGATIVE MG/DL
HCT VFR BLD AUTO: 30 % (ref 39–53)
HCT VFR BLD AUTO: 32.5 % (ref 39–53)
HGB BLD-MCNC: 10.4 G/DL (ref 13–17.5)
HGB BLD-MCNC: 9.5 G/DL (ref 13–17.5)
HGB UR QL STRIP.AUTO: NEGATIVE
IRON SATURATION: 8 % (ref 20–50)
IRON SERPL-MCNC: 25 UG/DL (ref 65–175)
LEUKOCYTE ESTERASE UR QL STRIP.AUTO: NEGATIVE
LYMPHOCYTES NFR BLD: 0.52 X10(3) UL (ref 1–4)
LYMPHOCYTES NFR BLD: 5 %
MCH RBC QN AUTO: 30.6 PG (ref 26–34)
MCHC RBC AUTO-ENTMCNC: 32 G/DL (ref 31–37)
MCV RBC AUTO: 95.6 FL (ref 80–100)
METAMYELOCYTES # BLD: 0.21 X10(3) UL
METAMYELOCYTES NFR BLD: 2 %
MICROALBUMIN UR-MCNC: 1.31 MG/DL
MICROALBUMIN/CREAT 24H UR-RTO: 9.8 UG/MG (ref ?–30)
MONOCYTES # BLD: 0.31 X10(3) UL (ref 0.1–1)
MONOCYTES NFR BLD: 3 %
NEUTROPHILS # BLD AUTO: 8.43 X10 (3) UL (ref 1.5–7.7)
NEUTROPHILS NFR BLD: 52 %
NEUTS BAND NFR BLD: 38 %
NEUTS HYPERSEG # BLD: 9.36 X10(3) UL (ref 1.5–7.7)
NITRITE UR QL STRIP.AUTO: NEGATIVE
OSMOLALITY SERPL CALC.SUM OF ELEC: 306 MOSM/KG (ref 275–295)
PH UR: 5 [PH] (ref 5–8)
PLATELET # BLD AUTO: 131 10(3)UL (ref 150–450)
PLATELET MORPHOLOGY: NORMAL
POTASSIUM SERPL-SCNC: 4.4 MMOL/L (ref 3.5–5.1)
PROT UR-MCNC: NEGATIVE MG/DL
RBC # BLD AUTO: 3.4 X10(6)UL (ref 3.8–5.8)
RBC #/AREA URNS AUTO: 2 /HPF
SODIUM SERPL-SCNC: 139 MMOL/L (ref 136–145)
SODIUM SERPL-SCNC: 30 MMOL/L
SP GR UR STRIP: 1.02 (ref 1–1.03)
TOTAL CELLS COUNTED: 100
TOTAL IRON BINDING CAPACITY: 332 UG/DL (ref 240–450)
TRANSFERRIN SERPL-MCNC: 223 MG/DL (ref 200–360)
UROBILINOGEN UR STRIP-ACNC: <2
WBC # BLD AUTO: 10.4 X10(3) UL (ref 4–11)
WBC #/AREA URNS AUTO: 1 /HPF

## 2020-07-01 PROCEDURE — 99223 1ST HOSP IP/OBS HIGH 75: CPT | Performed by: INTERNAL MEDICINE

## 2020-07-01 PROCEDURE — 99233 SBSQ HOSP IP/OBS HIGH 50: CPT | Performed by: HOSPITALIST

## 2020-07-01 PROCEDURE — 74176 CT ABD & PELVIS W/O CONTRAST: CPT | Performed by: HOSPITALIST

## 2020-07-01 PROCEDURE — 76775 US EXAM ABDO BACK WALL LIM: CPT | Performed by: HOSPITALIST

## 2020-07-01 RX ORDER — FUROSEMIDE 10 MG/ML
40 INJECTION INTRAMUSCULAR; INTRAVENOUS ONCE
Status: DISCONTINUED | OUTPATIENT
Start: 2020-07-01 | End: 2020-07-03

## 2020-07-01 RX ORDER — MIDODRINE HYDROCHLORIDE 5 MG/1
10 TABLET ORAL 3 TIMES DAILY
Status: DISCONTINUED | OUTPATIENT
Start: 2020-07-01 | End: 2020-07-03

## 2020-07-01 RX ORDER — FUROSEMIDE 10 MG/ML
INJECTION INTRAMUSCULAR; INTRAVENOUS
Status: DISPENSED
Start: 2020-07-01 | End: 2020-07-01

## 2020-07-01 NOTE — PAYOR COMM NOTE
--------------  CONTINUED STAY REVIEW    Payor: [address] #:  [id number]  Authorization Number: [id number]    Admit date: 6/26/20  Admit time: 56    Admitting Physician: Tim Shane MD  Attending Physician:  Javi Lara MD  Pr tomorrow     Orthostatic hypotension hx PD  We will monitor with extra fluids and increase Midodrine dose        ATTENDING -     Right groin angiogram site bleeding and large hematoma  -RLE arterial duplex - no pseudoaneurysm, AV fistula. Large hematoma.

## 2020-07-01 NOTE — PROGRESS NOTES
[facility]  Progress Note     Mehrdad [place]  : 1945    Status: Inpatient  Day #: 5    Attending: Benny Ruiz MD  PCP: [facility]      Assessment and Plan     Suspected acute CVA in distribution of right MCA  -CT negativ yesterday.      Objective     Temp:  [97.9 °F (36.6 °C)-99 °F (37.2 °C)] 98.4 °F (36.9 °C)  Pulse:  [73-85] 84  Resp:  [15-25] 20  BP: ()/(49-85) 94/51  General:  Alert, no distress  HEENT:  Normocephalic, atraumatic  Neck:  Supple, symmetrical  Cardi 32.0   *  --  127*  --  101*  --  108*  --  151*  --   --  115* 151*   MG  --  2.2  --   --   --   --   --   --   --   --   --   --   --    PTT 32.8  --   --    < > 83.2*   < > 56.2* 58.5*  --   --  32.0  --   --    INR 1.29*  --   --   --   --   -- guaiFENesin ER  1,200 mg Oral BID   • insulin detemir  5 Units Subcutaneous Nightly   • Umeclidinium Bromide  1 puff Inhalation Daily   • melatonin  5 mg Oral Nightly   • mirtazapine  7.5 mg Oral Nightly   • Montelukast Sodium  10 mg Oral Daily   • Vitamin

## 2020-07-01 NOTE — CM/SW NOTE
Case Management /Progression of Care    Confirmed patient choices for Acute Rehabilitation with patient and daughter Sergio Parson.    Alyssa Dial/Jonny Jones Authorization requested/ Pending in [place]    Case ID  [id number]    SW/CM to remain available for lombardi

## 2020-07-01 NOTE — PLAN OF CARE
Problem: Patient Centered Care  Goal: Patient preferences are identified and integrated in the patient's plan of care  Description  Interventions:  - What would you like us to know as we care for you?  I live at [facility].   - Provide timely, compl baseline  Description  INTERVENTIONS:  - Continuous cardiac monitoring, monitor vital signs, obtain 12 lead EKG if indicated  - Evaluate effectiveness of antiarrhythmic and heart rate control medications as ordered  - Initiate emergency measures for life t as needed  - Discontinue feeding and notify MD (or speech pathologist) if coughing or persistent throat clearing or wet/gurgly vocal quality is noted  Outcome: Progressing     Problem: Impaired Communication  Goal: Patient will achieve maximal communicatio

## 2020-07-01 NOTE — CONSULTS
06/30/20  1300 06/30/20  1400 06/30/20  1500 06/30/20  1600   BP: 104/64 113/85 116/74 136/77   Pulse: 73 76 76 74   Resp: 20 15 22 15   Temp:    98.3 °F (36.8 °C)   TempSrc:    Temporal   SpO2: 93% 94% 91% 93%   Weight:       Height:             Body mas try to simplify this

## 2020-07-01 NOTE — OCCUPATIONAL THERAPY NOTE
Pt not seen for OT at this time secondary to per nursing request, pt on hold for this date, and pt is off of floor for testing. Will follow up with pt next date as pt is appropriate.

## 2020-07-01 NOTE — SLP NOTE
SPEECH DAILY NOTE - INPATIENT    ASSESSMENT & PLAN   ASSESSMENT  Pt seen for meal assess/dysphagia therapy to monitor po tolerance of recommended diet and ensure adherence to aspiration precautions. Rn called because pt was coughing consistently with PO. with '100 % accuracy over 3 session(s). Consistent coughing with thin liquids. Recommend downgrade. New goal:    Pt will tolerate general diet with nectar thick liquids without overt signs of aspiration with 100% accuracy.   In Progress   Goal #2 The p

## 2020-07-01 NOTE — PROGRESS NOTES
RRT    *See RRT Documentation Record*    Reason the RRT was called: Patient blood pressure low, feeling dizzy and felt hands were weaker  Assessment of patient leading up to RRT: Patient got 500ml bolus, other vss. Blood pressure 70s and low 80s.  Patient h

## 2020-07-01 NOTE — PROGRESS NOTES
[facility]    Progress Note    125 Linda Ojeda Patient Status:  Inpatient    1945 MRN [id number]   Location [facility] 2W/SW Attending Ursula Locke MD   Hosp Day # 5 PCP Meredith Antonio         Assessment and Plan 7/1/2020 0628  Gross per 24 hour   Intake 430 ml   Output 350 ml   Net 80 ml      This shift: No intake/output data recorded.      Exam  Gen: No acute distress,   Psych:alert and oriented x3,   HEENT: normocephalic  Neck:supple,no JVD  Pulm: Lungs clear, no [facility] TERM ACUTE [facility]) 06/27/2020        Arterial Duplex Lower Extremity Right (cpt=93926)    Result Date: 6/30/2020  CONCLUSION:  1. Large hematoma. No pseudoaneurysm, arteriovenous fistula, or other vascular abnormality identified.     Dictated by (CST): Subha Rojo,

## 2020-07-02 LAB
ALBUMIN SERPL-MCNC: 3.1 G/DL (ref 3.4–5)
ALBUMIN/GLOB SERPL: 0.9 {RATIO} (ref 1–2)
ALP LIVER SERPL-CCNC: 110 U/L (ref 45–117)
ALT SERPL-CCNC: 8 U/L (ref 16–61)
ANION GAP SERPL CALC-SCNC: 4 MMOL/L (ref 0–18)
AST SERPL-CCNC: 13 U/L (ref 15–37)
BASOPHILS # BLD AUTO: 0.03 X10(3) UL (ref 0–0.2)
BASOPHILS NFR BLD AUTO: 0.2 %
BILIRUB SERPL-MCNC: 0.6 MG/DL (ref 0.1–2)
BUN BLD-MCNC: 61 MG/DL (ref 7–18)
BUN/CREAT SERPL: 25.4 (ref 10–20)
CALCIUM BLD-MCNC: 8.4 MG/DL (ref 8.5–10.1)
CHLORIDE SERPL-SCNC: 102 MMOL/L (ref 98–112)
CO2 SERPL-SCNC: 33 MMOL/L (ref 21–32)
CREAT BLD-MCNC: 2.4 MG/DL (ref 0.7–1.3)
DEPRECATED RDW RBC AUTO: 67.3 FL (ref 35.1–46.3)
EOSINOPHIL # BLD AUTO: 0.45 X10(3) UL (ref 0–0.7)
EOSINOPHIL NFR BLD AUTO: 3.3 %
ERYTHROCYTE [DISTWIDTH] IN BLOOD BY AUTOMATED COUNT: 18.6 % (ref 11–15)
GLOBULIN PLAS-MCNC: 3.4 G/DL (ref 2.8–4.4)
GLUCOSE BLD-MCNC: 146 MG/DL (ref 70–99)
GLUCOSE BLDC GLUCOMTR-MCNC: 147 MG/DL (ref 70–99)
GLUCOSE BLDC GLUCOMTR-MCNC: 183 MG/DL (ref 70–99)
GLUCOSE BLDC GLUCOMTR-MCNC: 262 MG/DL (ref 70–99)
GLUCOSE BLDC GLUCOMTR-MCNC: 297 MG/DL (ref 70–99)
HAV IGM SER QL: 2.2 MG/DL (ref 1.6–2.6)
HCT VFR BLD AUTO: 28.5 % (ref 39–53)
HGB BLD-MCNC: 8.9 G/DL (ref 13–17.5)
IMM GRANULOCYTES # BLD AUTO: 0.07 X10(3) UL (ref 0–1)
IMM GRANULOCYTES NFR BLD: 0.5 %
LYMPHOCYTES # BLD AUTO: 1.3 X10(3) UL (ref 1–4)
LYMPHOCYTES NFR BLD AUTO: 9.4 %
M PROTEIN MFR SERPL ELPH: 6.5 G/DL (ref 6.4–8.2)
MCH RBC QN AUTO: 30.5 PG (ref 26–34)
MCHC RBC AUTO-ENTMCNC: 31.2 G/DL (ref 31–37)
MCV RBC AUTO: 97.6 FL (ref 80–100)
MONOCYTES # BLD AUTO: 1.27 X10(3) UL (ref 0.1–1)
MONOCYTES NFR BLD AUTO: 9.2 %
NEUTROPHILS # BLD AUTO: 10.68 X10 (3) UL (ref 1.5–7.7)
NEUTROPHILS # BLD AUTO: 10.68 X10(3) UL (ref 1.5–7.7)
NEUTROPHILS NFR BLD AUTO: 77.4 %
OSMOLALITY SERPL CALC.SUM OF ELEC: 308 MOSM/KG (ref 275–295)
PHOSPHATE SERPL-MCNC: 4.4 MG/DL (ref 2.5–4.9)
PLATELET # BLD AUTO: 118 10(3)UL (ref 150–450)
POTASSIUM SERPL-SCNC: 4.4 MMOL/L (ref 3.5–5.1)
RBC # BLD AUTO: 2.92 X10(6)UL (ref 3.8–5.8)
SODIUM SERPL-SCNC: 139 MMOL/L (ref 136–145)
WBC # BLD AUTO: 13.8 X10(3) UL (ref 4–11)

## 2020-07-02 PROCEDURE — 99233 SBSQ HOSP IP/OBS HIGH 50: CPT | Performed by: HOSPITALIST

## 2020-07-02 PROCEDURE — 99233 SBSQ HOSP IP/OBS HIGH 50: CPT | Performed by: INTERNAL MEDICINE

## 2020-07-02 RX ORDER — FINASTERIDE 5 MG/1
5 TABLET, FILM COATED ORAL DAILY
Status: DISCONTINUED | OUTPATIENT
Start: 2020-07-02 | End: 2020-07-03

## 2020-07-02 RX ORDER — CETIRIZINE HYDROCHLORIDE 5 MG/1
5 TABLET ORAL DAILY
Status: DISCONTINUED | OUTPATIENT
Start: 2020-07-03 | End: 2020-07-03

## 2020-07-02 NOTE — PLAN OF CARE
Problem: Patient Centered Care  Goal: Patient preferences are identified and integrated in the patient's plan of care  Description  Interventions:  - What would you like us to know as we care for you?  I live at [facility].   - Provide timely, compl baseline  Description  INTERVENTIONS:  - Continuous cardiac monitoring, monitor vital signs, obtain 12 lead EKG if indicated  - Evaluate effectiveness of antiarrhythmic and heart rate control medications as ordered  - Initiate emergency measures for life t feedings (90 degrees preferred)  - Offer food and liquids at a slow rate  - No straws  - Encourage small bites of food and small sips of liquid  - Offer pills one at a time, crush or deliver with applesauce as needed  - Discontinue feeding and notify MD (o Plan for [facility] at discharge when medically cleared.

## 2020-07-02 NOTE — PLAN OF CARE
Problem: Patient Centered Care  Goal: Patient preferences are identified and integrated in the patient's plan of care  Description  Interventions:  - What would you like us to know as we care for you?  I live at [facility].   - Provide timely, compl baseline  Description  INTERVENTIONS:  - Continuous cardiac monitoring, monitor vital signs, obtain 12 lead EKG if indicated  - Evaluate effectiveness of antiarrhythmic and heart rate control medications as ordered  - Initiate emergency measures for life t as needed  - Discontinue feeding and notify MD (or speech pathologist) if coughing or persistent throat clearing or wet/gurgly vocal quality is noted  Outcome: Progressing     Problem: Impaired Communication  Goal: Patient will achieve maximal communicatio

## 2020-07-02 NOTE — PAYOR COMM NOTE
--------------  CONTINUED STAY REVIEW    Payor: [address] #:  [id number]  Authorization Number: [id number]    Admit date: 6/26/20  Admit time: 56    Admitting Physician: Ivana Harmon MD  Attending Physician:  Wallis Lombard, MD  Pr US KIDNEYS   CONCLUSION:   1. Moderate right renal atrophy , and mild left renal atrophy. No hydronephrosis. Cortical thinning bilaterally. Normal echogenicity. Normal urinary bladder. CARDIOLOGY -     Resting comfortably in bed.  Still has right  Normal echogenicity  - no indication for renal US with doppler   - avoid nephrotoxins   - phos wnl  - bladder scan at bedside - <20 cc  - check orthostatics - defer flomax  - will start proscar          INSURANCE PENDING FOR ACUTE REHAB        APPROVED TO

## 2020-07-02 NOTE — OCCUPATIONAL THERAPY NOTE
OCCUPATIONAL THERAPY TREATMENT NOTE - INPATIENT        Room Number: 583/794-O           Presenting Problem: CVA    Problem List  Principal Problem:    Acute CVA (cerebrovascular accident) ([address]  Active Problems:    Elevated troponin    ARSLAN (acute kidney in aware)  Management Techniques: Relaxation;Repositioning     ACTIVITY TOLERANCE                         O2 SATURATIONS                ACTIVITIES OF DAILY LIVING ASSESSMENT  AM-PAC ‘6-Clicks’ Inpatient Daily Activity Short Form  How much help from another pe toileting with mod I  Comment: NT    Patient will tolerate standing for 7 minutes in prep for adls with mod I   Comment: pt tolerated standing 4 min with CGA , RW for support    Patient will complete item retrieval with mod I  Comment: CGA with short ambul

## 2020-07-02 NOTE — CM/SW NOTE
Case Management/ Progression of Care    Insurance Authorization is received  for [facility] acute rehabilitation , once patient is medically cleared.      [facility]   Authorization  number    [id number]    PLAN: Chase Vasquez Acute rehabiliation  Pending med

## 2020-07-02 NOTE — PROGRESS NOTES
[facility]  Progress Note     Michelle Piña  : 1945    Status: Inpatient  Day #: 6    Attending: Beronica Anderson MD  PCP: [facility]      Assessment and Plan     Suspected acute CVA in distribution of right MCA  -L weakness today.     Objective     Temp:  [97.3 °F (36.3 °C)-98 °F (36.7 °C)] 97.3 °F (36.3 °C)  Pulse:  [69-70] 70  Resp:  [20] 20  BP: ()/(51-70) 96/58  General:  Alert, no distress  HEENT:  Normocephalic, atraumatic  Neck:  Supple, symmetrical  Cardiac:  Re CO2 36.0*  --  32.0  --  37.0*  --  35.0*  --    < >  --  33.0* 32.0 33.0*   *  --  127*  --  101*  --  108*  --    < >  --  115* 151* 146*   MG  --  2.2  --   --   --   --   --   --   --   --   --   --  2.2   PHOS  --   --   --   --   --   --   - (cpt=71045)    Result Date: 7/1/2020  CONCLUSION:  1. Cardiomegaly. Tortuous thoracic aorta. 2. Extensive bilateral mixed alveolar and interstitial parenchymal abnormality most pronounced at the left base with accompanying left-sided effusion.   Bilateral

## 2020-07-02 NOTE — SLP NOTE
SPEECH DAILY NOTE - INPATIENT    ASSESSMENT & PLAN   ASSESSMENT  SLP f/u for ongoing dysphagia tx and meal assessment per recommendations of regular/nectar thick liquids per downgrade on 7/1.  RN reports pt tolerates diet and medication well with no overt c Finalized by (CST): Beto Ferrer MD on 7/01/2020 at 6:39 AM        Diet Recommendations - Solids: Regular  Diet Recommendations - Liquid: Nectar thick    Compensatory Strategies Recommended: No straws; Slow rate; Alternate consistencies;Small bites tolerate trials of thin liquids without overt signs/symptoms of aspiration with 90% accuracy over 2 sessions.  In Progress       SPEECH GOALS  Goal #1 The patient will complete OMEs targeting Lingual, Labial and Buccal strength, ROM, and coordination with 9

## 2020-07-02 NOTE — PROGRESS NOTES
[facility] Pharmacy Note:  Renal Dose Adjustment for Cetirizine (ZYRTEC)    Baron Barry has been prescribed Cetirizine (Zyrtec) 10 mg orally daily. Estimated Creatinine Clearance: 27.7 mL/min (A) (based on SCr of 2.4 mg/dL (H)).     His calculated c

## 2020-07-02 NOTE — PROGRESS NOTES
[facility]    Progress Note      Subjective:     Patient sitting comfortably - working with PT    Finished BF - speech therapy eval and on nectar thick liquid     Urinary retention overnight and was straight cath with 600 cc UO.  No urine Blocker  HYDROcodone-acetaminophen (NORCO) 5-325 MG per tab 1 tablet, 1 tablet, Oral, Q6H PRN  pregabalin (LYRICA) cap 150 mg, 150 mg, Oral, TID  gabapentin (NEURONTIN) cap 100 mg, 100 mg, Oral, TID  HYDROmorphone HCl (DILAUDID) 1 MG/ML injection 0.5 mg, 0 (PLAVIX) tab 75 mg, 75 mg, Oral, Daily  docusate sodium (COLACE) cap 100 mg, 100 mg, Oral, BID  ferrous sulfate EC tab 325 mg, 325 mg, Oral, Daily with breakfast  guaiFENesin ER (MUCINEX) 12 hr tab 1,200 mg, 1,200 mg, Oral, BID  insulin detemir (LEVEMIR) 1 hours.   Recent Labs   Lab 06/26/20  1503 07/02/20  0455   MG 2.2 2.2   PHOS  --  4.4     Lab Results   Component Value Date    COLORUR Yellow 07/01/2020    CLARITY Hazy 07/01/2020    SPECGRAVITY 1.018 07/01/2020    GLUUR Negative 07/01/2020    Harsh Meza most pronounced at the left base with accompanying left-sided effusion. Bilateral interval progression.     Dictated by (CST): Elizabeth Zurita MD on 7/01/2020 at 6:35 AM     Finalized by (CST): Elizabeth Zurita MD on 7/01/2020 at 6:39 AM

## 2020-07-02 NOTE — PHYSICAL THERAPY NOTE
PHYSICAL THERAPY TREATMENT NOTE - INPATIENT     Room Number: 802/616-M       Presenting Problem: cva    Problem List  Principal Problem:    Acute CVA (cerebrovascular accident) ([facility]  Active Problems:    Elevated troponin    ARSLAN (acute kidney injury) ([facility] -    ACTIVITY TOLERANCE                         O2 WALK                  AM-PAC '6-Clicks' INPATIENT SHORT FORM - BASIC MOBILITY  How much difficulty does the patient currently have. ..  -   Turning over in bed (including adjusting bedclothes, sheets and bl device: walker - rolling at assistance level: supervision   Goal #3   Current Status 400' feet with RW SBA decreased LLE step length and toe off   Goal #4     Goal #4   Current Status     Goal #5 Patient to demonstrate independence with home activity/exerc

## 2020-07-02 NOTE — CONSULTS
[facility]    PATIENT'S NAME: Isela Huertas   ATTENDING PHYSICIAN: Antoinette Linares MD   CONSULTING PHYSICIAN: Mark Martinez MD   PATIENT ACCOUNT#:   [de-identified]    LOCATION:  3WSW [facility] #:   [id number]       DATE OF SANDRINE gabapentin, Mucinex, NovoLog, Levemir, melatonin, Toprol-XL 12.5 mg daily, midodrine 10 mg t.i.d., Remeron, Singulair, Lyrica, Incruse Ellipta, and vitamin C. ALLERGIES:  He is allergic to Ancef. SOCIAL HISTORY:  Nonsmoker.   Lives at a nursing home though has also dropped. This may be resulting in decreased renal perfusion. 2.   Acute cerebrovascular accident with left-sided hemiparesis, which is slowly improving. 3.   History of coronary artery disease, status post stent placement.   His last echo

## 2020-07-02 NOTE — PROGRESS NOTES
[place] [facility]    Progress Note    Radha Innocent Patient Status:  Inpatient    1945 MRN [id number]   Location [facility] 3W/SW Attending Amelia Guzmán MD   Hosp Day # 6 PCP Jean Barron        Subjective:     Mack Patricia PD  -midodrine    Plan  Discussed with Dr. Phyllistine Burkitt. CV status stable. Will sign off. Please ensure follow up outpatient with cardiology (his cardiologist is at [facility]) either there or with AM for adequate cardiac surveillance.  Would recommend continuing (cpt=71045)    Result Date: 7/1/2020  CONCLUSION:  1. Cardiomegaly. Tortuous thoracic aorta. 2. Extensive bilateral mixed alveolar and interstitial parenchymal abnormality most pronounced at the left base with accompanying left-sided effusion.   Bilateral

## 2020-07-03 VITALS
WEIGHT: 208.13 LBS | TEMPERATURE: 98 F | OXYGEN SATURATION: 98 % | HEIGHT: 68.9 IN | DIASTOLIC BLOOD PRESSURE: 65 MMHG | SYSTOLIC BLOOD PRESSURE: 106 MMHG | HEART RATE: 70 BPM | BODY MASS INDEX: 30.83 KG/M2 | RESPIRATION RATE: 18 BRPM

## 2020-07-03 LAB
ALBUMIN SERPL-MCNC: 3 G/DL (ref 3.4–5)
ANION GAP SERPL CALC-SCNC: 3 MMOL/L (ref 0–18)
BASOPHILS # BLD AUTO: 0.02 X10(3) UL (ref 0–0.2)
BASOPHILS NFR BLD AUTO: 0.2 %
BUN BLD-MCNC: 56 MG/DL (ref 7–18)
BUN/CREAT SERPL: 28 (ref 10–20)
CALCIUM BLD-MCNC: 8.4 MG/DL (ref 8.5–10.1)
CHLORIDE SERPL-SCNC: 105 MMOL/L (ref 98–112)
CO2 SERPL-SCNC: 32 MMOL/L (ref 21–32)
CREAT BLD-MCNC: 2 MG/DL (ref 0.7–1.3)
DEPRECATED RDW RBC AUTO: 66.1 FL (ref 35.1–46.3)
EOSINOPHIL # BLD AUTO: 0.54 X10(3) UL (ref 0–0.7)
EOSINOPHIL NFR BLD AUTO: 4.7 %
ERYTHROCYTE [DISTWIDTH] IN BLOOD BY AUTOMATED COUNT: 18.3 % (ref 11–15)
GLUCOSE BLD-MCNC: 181 MG/DL (ref 70–99)
GLUCOSE BLDC GLUCOMTR-MCNC: 313 MG/DL (ref 70–99)
HAV IGM SER QL: 2.4 MG/DL (ref 1.6–2.6)
HCT VFR BLD AUTO: 28.7 % (ref 39–53)
HGB BLD-MCNC: 9.2 G/DL (ref 13–17.5)
IMM GRANULOCYTES # BLD AUTO: 0.04 X10(3) UL (ref 0–1)
IMM GRANULOCYTES NFR BLD: 0.4 %
LYMPHOCYTES # BLD AUTO: 1.33 X10(3) UL (ref 1–4)
LYMPHOCYTES NFR BLD AUTO: 11.7 %
MCH RBC QN AUTO: 31.5 PG (ref 26–34)
MCHC RBC AUTO-ENTMCNC: 32.1 G/DL (ref 31–37)
MCV RBC AUTO: 98.3 FL (ref 80–100)
MONOCYTES # BLD AUTO: 0.85 X10(3) UL (ref 0.1–1)
MONOCYTES NFR BLD AUTO: 7.5 %
NEUTROPHILS # BLD AUTO: 8.59 X10 (3) UL (ref 1.5–7.7)
NEUTROPHILS # BLD AUTO: 8.59 X10(3) UL (ref 1.5–7.7)
NEUTROPHILS NFR BLD AUTO: 75.5 %
OSMOLALITY SERPL CALC.SUM OF ELEC: 310 MOSM/KG (ref 275–295)
PHOSPHATE SERPL-MCNC: 3.1 MG/DL (ref 2.5–4.9)
PLATELET # BLD AUTO: 138 10(3)UL (ref 150–450)
POTASSIUM SERPL-SCNC: 4.4 MMOL/L (ref 3.5–5.1)
RBC # BLD AUTO: 2.92 X10(6)UL (ref 3.8–5.8)
SODIUM SERPL-SCNC: 140 MMOL/L (ref 136–145)
WBC # BLD AUTO: 11.4 X10(3) UL (ref 4–11)

## 2020-07-03 PROCEDURE — 99232 SBSQ HOSP IP/OBS MODERATE 35: CPT | Performed by: INTERNAL MEDICINE

## 2020-07-03 PROCEDURE — 99239 HOSP IP/OBS DSCHRG MGMT >30: CPT | Performed by: HOSPITALIST

## 2020-07-03 RX ORDER — METOPROLOL SUCCINATE 25 MG/1
12.5 TABLET, EXTENDED RELEASE ORAL
Refills: 0 | Status: SHIPPED | COMMUNITY
Start: 2020-07-04

## 2020-07-03 RX ORDER — MIDODRINE HYDROCHLORIDE 10 MG/1
10 TABLET ORAL 3 TIMES DAILY
Refills: 0 | Status: SHIPPED | COMMUNITY
Start: 2020-07-03

## 2020-07-03 RX ORDER — PREGABALIN 150 MG/1
150 CAPSULE ORAL 3 TIMES DAILY
Refills: 0 | Status: SHIPPED | COMMUNITY
Start: 2020-07-03

## 2020-07-03 RX ORDER — FINASTERIDE 5 MG/1
5 TABLET, FILM COATED ORAL DAILY
Refills: 0 | Status: SHIPPED | COMMUNITY
Start: 2020-07-04

## 2020-07-03 RX ORDER — GABAPENTIN 100 MG/1
100 CAPSULE ORAL 3 TIMES DAILY
Refills: 0 | Status: SHIPPED | COMMUNITY
Start: 2020-07-03

## 2020-07-03 NOTE — OCCUPATIONAL THERAPY NOTE
OCCUPATIONAL THERAPY TREATMENT NOTE - INPATIENT        Room Number: 308/755-K           Presenting Problem: CVA    Problem List  Principal Problem:    Acute CVA (cerebrovascular accident) ([facility]  Active Problems:    Elevated troponin    ARSLAN (acute kidney in ambulation, nursing aware)  Management Techniques: Relaxation;Repositioning     ACTIVITY TOLERANCE                         O2 SATURATIONS                ACTIVITIES OF DAILY LIVING ASSESSMENT  AM-PAC ‘6-Clicks’ Inpatient Daily Activity Short Form  How much in prep for adls with mod I   Comment: pt tolerated standing 5 min with CGA , RW for support    Patient will complete item retrieval with mod I  Comment: CGA with short ambulation and with increased time    Patient will complete L UE AROM exercises, 10 rep

## 2020-07-03 NOTE — DISCHARGE SUMMARY
[place] [facility]  Discharge Summary     Singh Munson  : 1945    Status: Inpatient  Day #: 7    Attending: Omar Trammell MD  PCP: Leo Perez     Date of Admission: 2020  Date of Discharge: 7/3/2020     Hospital Discharge D CAD.  -cardiology on consult  -cont plavix.  No aspirin per cards.  -cont lipitor  -echo shows EF 40% with areas of hypokinesis  -pt started on low dose metoprolol - holding parameters for hypotension     Right groin angiogram site bleeding and large hemat symmetrical  Cardiac:  Regular rate, regular rhythm  Pulmonary:  Clear to auscultation bilaterally, respirations unlabored  Gastrointestinal:  Soft, non-tender, normal bowel sounds  Musculoskeletal:  No joint swelling  Extremities:  No edema, no cyanosis, name was removed. Continue taking this medication, and follow the directions you see here. Take 1 capsule (100 mg total) by mouth 3 (three) times daily.    Refills:  0     Midodrine HCl 10 MG Tabs  Commonly known as:  PROAMATINE  What changed:    · med mirtazapine 15 MG Tabs  Commonly known as:  REMERON      Take 7.5 mg by mouth nightly. Refills:  0     Montelukast Sodium 10 MG Tabs  Commonly known as:  SINGULAIR      Take 10 mg by mouth daily.    Refills:  0     Siltussin  MG/5ML Syrp  Generic

## 2020-07-03 NOTE — CM/SW NOTE
Previous CM notes indicate insurance auth was obtained through [facility] # [id number]. SW sent updated clinicals to [facility] Acute rehab via [employer].     Zamzam Donato [facility] MSSHRAVAN, [address] [place] [address], S..

## 2020-07-03 NOTE — PROGRESS NOTES
[facility]    Electrophysiology Follow Up Note      Baron Barry Patient Status:  Inpatient    1945 MRN [id number]   Location [facility] 3W/SW Attending Bartolo Bamberger, MD   Hosp Day # 7 PCP oJse [place] 79 tablet, Oral, Q15 Min PRN  •  Insulin Aspart Pen (NOVOLOG) 100 UNIT/ML flexpen 1-7 Units, 1-7 Units, Subcutaneous, TID CC  •  Normal Saline Flush 0.9 % injection 3 mL, 3 mL, Intravenous, PRN  •  acetaminophen (TYLENOL) tab 650 mg, 650 mg, Oral, Q6H PRN  • Daily  •  Vitamin C tab 500 mg, 500 mg, Oral, Daily      Physical Exam:  Blood pressure 118/76, pulse 94, temperature 98.4 °F (36.9 °C), temperature source Oral, resp. rate 18, height 68.9\", weight 208 lb 1.6 oz, SpO2 95 %. Temp (24hrs), Av.5 °F (36. obstructive CAD  - plavix, statin (not on DAPT as he is on apixaban at baseline)    2) ischemic cardiomyopathy  - LVEF 40%, hypokinesis in territory of mid and distal LAD  - well compensated  - continue metoprolol  - consider ACEI/ARB in the future    3) b

## 2020-07-03 NOTE — PROGRESS NOTES
[facility]  Nephrology Daily Progress Note    Mickey Stewart  [id number]  69year old      HPI:   Mickey Wuer is a 69year old male. Overall feels better. R groin discomfort but manageable.   Ambulating in room with wa appropriate for age reflexes and motor skills appropriate for age    Labs:  Lab Results   Component Value Date    WBC 11.4 07/03/2020    HGB 9.2 07/03/2020    HCT 28.7 07/03/2020    .0 07/03/2020    CREATSERUM 2.00 07/03/2020    BUN 56 07/03/2020 to moderate pericardial effusion. Incidentally noted 4.4 cm ascending thoracic aortic aneurysm. Colonic diverticulosis. Prominent prostate. Lesser incidental findings as above.          Dictated by (CST): Vin Biggs MD on 7/01/2020 at 12:30 PM chewable tab 8 tablet, 8 tablet, Oral, Q15 Min PRN  •  Insulin Aspart Pen (NOVOLOG) 100 UNIT/ML flexpen 1-7 Units, 1-7 Units, Subcutaneous, TID CC  •  Normal Saline Flush 0.9 % injection 3 mL, 3 mL, Intravenous, PRN  •  acetaminophen (TYLENOL) tab 650 mg, tab 10 mg, 10 mg, Oral, Daily  •  Vitamin C tab 500 mg, 500 mg, Oral, Daily    Allergies:     Ancef [Cefazolin]       UNKNOWN  Ancestim                UNKNOWN  Chocolate               UNKNOWN    Input/Output:    Intake/Output Summary (Last 24 hours) at 7/3/

## 2020-07-03 NOTE — PAYOR COMM NOTE
--------------  CONTINUED STAY REVIEW    Payor: [id number] #:  [id number]  Authorization Number: [id number]    Admit date: 6/26/20  Admit time: 56    Admitting Physician: Shavon Roland MD  Attending Physician:  Daisha Simpson MD  Pr

## 2020-07-03 NOTE — SLP NOTE
SPEECH DAILY NOTE - INPATIENT    ASSESSMENT & PLAN   ASSESSMENT    Treatment session focused on training of OME, DDK drills, speech compensatory strategies for all speech contexts. Progress as stated below. Will continue with current plan of care.  Pt with overarticulated/open mouth speech, breath-pause to breath between words) with 90% accuracy within 5 sessions during conversation. Again reinforced/reviewed speech strategies for use in conversation.  Mild cues need for breath between words and exag

## 2020-07-03 NOTE — CM/SW NOTE
07/03/20 1100   Discharge disposition   Expected discharge disposition [facility]   Name of Laura 224   Discharge transportation [facility]   MDO received for discharge to [facility] ([facility]  Patient is

## 2020-07-03 NOTE — PLAN OF CARE
VSS. Midodrine given. Pt is ok for discharge. Pt still with some numbness and tingling to left side, Strength the same. Pt going to [facility] for rehab, papers given to [employer], report given to [place] at [facility].  All questions answered and paperwork sent for bleeding and/or hematoma  - Assess quality of pulses, skin color and temperature  - Assess for signs of decreased coronary artery perfusion - ex.  Angina  - Evaluate fluid balance, assess for edema, trend weights  Outcome: Adequate for Discharge  Goal: Impaired Swallowing  Goal: Minimize aspiration risk  Description  Interventions:  - Patient should be alert and upright for all feedings (90 degrees preferred)  - Offer food and liquids at a slow rate  - No straws  - Encourage small bites of food and small

## 2020-07-03 NOTE — SLP NOTE
SWALLOW DAILY NOTE - INPATIENT    ASSESSMENT & PLAN   ASSESSMENT    PPE REQUIRED. THIS SLP WORE GLOVES AND DROPLET MASK. HANDS SANITIZED/WASHED UPON ENTRANCE/EXIT. Pt alert, afebrile and on room air.  Pt seen for swallow analysis per VFSS recommendatio Upright position; Slow rate;Small bites and sips; No straw  Medication Administration Recommendations: Crushed in puree    Patient Experiencing Pain: No      Discharge Recommendations  Discharge Recommendations/Plan: Undetermined    Treatment Plan  Treatment Established Goals: 3( dysphagia   5 dysarthria)    Session: 3    If you have any questions, please contact     Andrea Goldstein M.S. LULU/SLP  Speech-Language Pathologist  [facility]  #[phone]

## 2023-11-16 ENCOUNTER — APPOINTMENT (OUTPATIENT)
Dept: GENERAL RADIOLOGY | Facility: HOSPITAL | Age: 78
DRG: 286 | End: 2023-11-16
Attending: EMERGENCY MEDICINE
Payer: MEDICARE

## 2023-11-16 ENCOUNTER — HOSPITAL ENCOUNTER (INPATIENT)
Facility: HOSPITAL | Age: 78
LOS: 12 days | Discharge: HOME | DRG: 286 | End: 2023-11-28
Attending: EMERGENCY MEDICINE | Admitting: HOSPITALIST
Payer: MEDICARE

## 2023-11-16 DIAGNOSIS — R09.02 HYPOXIA: ICD-10-CM

## 2023-11-16 DIAGNOSIS — I50.9 ACUTE ON CHRONIC CONGESTIVE HEART FAILURE, UNSPECIFIED HEART FAILURE TYPE (HCC): Primary | ICD-10-CM

## 2023-11-16 PROBLEM — J96.01 ACUTE HYPOXEMIC RESPIRATORY FAILURE (HCC): Status: ACTIVE | Noted: 2023-11-16

## 2023-11-16 LAB
ANION GAP SERPL CALC-SCNC: 4 MMOL/L (ref 0–18)
ATRIAL RATE: 37 BPM
BASOPHILS # BLD AUTO: 0.01 X10(3) UL (ref 0–0.2)
BASOPHILS NFR BLD AUTO: 0.1 %
BNP SERPL-MCNC: 453 PG/ML
BUN BLD-MCNC: 17 MG/DL (ref 9–23)
BUN/CREAT SERPL: 8.7 (ref 10–20)
CALCIUM BLD-MCNC: 9.3 MG/DL (ref 8.7–10.4)
CHLORIDE SERPL-SCNC: 104 MMOL/L (ref 98–112)
CO2 SERPL-SCNC: 35 MMOL/L (ref 21–32)
CREAT BLD-MCNC: 1.96 MG/DL
DEPRECATED RDW RBC AUTO: 51.6 FL (ref 35.1–46.3)
EGFRCR SERPLBLD CKD-EPI 2021: 35 ML/MIN/1.73M2 (ref 60–?)
EOSINOPHIL # BLD AUTO: 0.1 X10(3) UL (ref 0–0.7)
EOSINOPHIL NFR BLD AUTO: 1.2 %
ERYTHROCYTE [DISTWIDTH] IN BLOOD BY AUTOMATED COUNT: 14.9 % (ref 11–15)
EST. AVERAGE GLUCOSE BLD GHB EST-MCNC: 174 MG/DL (ref 68–126)
FLUAV + FLUBV RNA SPEC NAA+PROBE: NEGATIVE
FLUAV + FLUBV RNA SPEC NAA+PROBE: NEGATIVE
GLUCOSE BLD-MCNC: 131 MG/DL (ref 70–99)
GLUCOSE BLDC GLUCOMTR-MCNC: 133 MG/DL (ref 70–99)
GLUCOSE BLDC GLUCOMTR-MCNC: 151 MG/DL (ref 70–99)
HBA1C MFR BLD: 7.7 % (ref ?–5.7)
HCT VFR BLD AUTO: 29.7 %
HGB BLD-MCNC: 9.3 G/DL
IMM GRANULOCYTES # BLD AUTO: 0.02 X10(3) UL (ref 0–1)
IMM GRANULOCYTES NFR BLD: 0.2 %
INR BLD: 6.15 (ref 0.8–1.2)
LYMPHOCYTES # BLD AUTO: 0.83 X10(3) UL (ref 1–4)
LYMPHOCYTES NFR BLD AUTO: 10.1 %
MCH RBC QN AUTO: 29.6 PG (ref 26–34)
MCHC RBC AUTO-ENTMCNC: 31.3 G/DL (ref 31–37)
MCV RBC AUTO: 94.6 FL
MONOCYTES # BLD AUTO: 0.93 X10(3) UL (ref 0.1–1)
MONOCYTES NFR BLD AUTO: 11.4 %
MRSA DNA SPEC QL NAA+PROBE: POSITIVE
NEUTROPHILS # BLD AUTO: 6.29 X10 (3) UL (ref 1.5–7.7)
NEUTROPHILS # BLD AUTO: 6.29 X10(3) UL (ref 1.5–7.7)
NEUTROPHILS NFR BLD AUTO: 77 %
OSMOLALITY SERPL CALC.SUM OF ELEC: 299 MOSM/KG (ref 275–295)
PLATELET # BLD AUTO: 193 10(3)UL (ref 150–450)
POTASSIUM SERPL-SCNC: 3.3 MMOL/L (ref 3.5–5.1)
PROCALCITONIN SERPL-MCNC: 0.04 NG/ML (ref ?–0.16)
PROTHROMBIN TIME: 58.3 SECONDS (ref 11.6–14.8)
Q-T INTERVAL: 382 MS
QRS DURATION: 138 MS
QTC CALCULATION (BEZET): 440 MS
R AXIS: 202 DEGREES
RBC # BLD AUTO: 3.14 X10(6)UL
RSV RNA SPEC NAA+PROBE: NEGATIVE
SARS-COV-2 RNA RESP QL NAA+PROBE: NOT DETECTED
SODIUM SERPL-SCNC: 143 MMOL/L (ref 136–145)
T AXIS: 17 DEGREES
TROPONIN I SERPL HS-MCNC: 46 NG/L
VENTRICULAR RATE: 80 BPM
WBC # BLD AUTO: 8.2 X10(3) UL (ref 4–11)

## 2023-11-16 PROCEDURE — 99223 1ST HOSP IP/OBS HIGH 75: CPT | Performed by: HOSPITALIST

## 2023-11-16 PROCEDURE — 71045 X-RAY EXAM CHEST 1 VIEW: CPT | Performed by: EMERGENCY MEDICINE

## 2023-11-16 RX ORDER — METOCLOPRAMIDE 5 MG/1
5 TABLET ORAL 4 TIMES DAILY
COMMUNITY
End: 2023-11-28

## 2023-11-16 RX ORDER — LEVOTHYROXINE SODIUM 0.07 MG/1
75 TABLET ORAL
Status: DISCONTINUED | OUTPATIENT
Start: 2023-11-16 | End: 2023-11-28

## 2023-11-16 RX ORDER — POTASSIUM CHLORIDE 20 MEQ/1
40 TABLET, EXTENDED RELEASE ORAL ONCE
Status: COMPLETED | OUTPATIENT
Start: 2023-11-16 | End: 2023-11-16

## 2023-11-16 RX ORDER — FLUTICASONE PROPIONATE AND SALMETEROL 250; 50 UG/1; UG/1
1 POWDER RESPIRATORY (INHALATION) EVERY 12 HOURS SCHEDULED
COMMUNITY

## 2023-11-16 RX ORDER — ONDANSETRON 2 MG/ML
4 INJECTION INTRAMUSCULAR; INTRAVENOUS EVERY 6 HOURS PRN
Status: DISCONTINUED | OUTPATIENT
Start: 2023-11-16 | End: 2023-11-28

## 2023-11-16 RX ORDER — PANTOPRAZOLE SODIUM 40 MG/1
40 TABLET, DELAYED RELEASE ORAL
COMMUNITY

## 2023-11-16 RX ORDER — SUCRALFATE 1 G/1
1 TABLET ORAL
Status: DISCONTINUED | OUTPATIENT
Start: 2023-11-16 | End: 2023-11-28

## 2023-11-16 RX ORDER — BUMETANIDE 0.5 MG/1
0.5 TABLET ORAL DAILY
Status: ON HOLD | COMMUNITY
End: 2023-11-28

## 2023-11-16 RX ORDER — AMIODARONE HYDROCHLORIDE 200 MG/1
200 TABLET ORAL DAILY
COMMUNITY
End: 2023-11-28

## 2023-11-16 RX ORDER — METOLAZONE 2.5 MG/1
2.5 TABLET ORAL DAILY
COMMUNITY
End: 2023-11-28

## 2023-11-16 RX ORDER — TIOTROPIUM BROMIDE 18 UG/1
1 CAPSULE ORAL; RESPIRATORY (INHALATION) DAILY
COMMUNITY

## 2023-11-16 RX ORDER — LEVOTHYROXINE SODIUM 0.07 MG/1
75 TABLET ORAL
COMMUNITY

## 2023-11-16 RX ORDER — NICOTINE POLACRILEX 4 MG
15 LOZENGE BUCCAL
Status: DISCONTINUED | OUTPATIENT
Start: 2023-11-16 | End: 2023-11-28

## 2023-11-16 RX ORDER — INSULIN GLARGINE 100 [IU]/ML
10 INJECTION, SOLUTION SUBCUTANEOUS NIGHTLY
Status: ON HOLD | COMMUNITY
End: 2023-11-26

## 2023-11-16 RX ORDER — SUCRALFATE 1 G/1
1 TABLET ORAL
COMMUNITY

## 2023-11-16 RX ORDER — CYCLOBENZAPRINE HCL 5 MG
5 TABLET ORAL 3 TIMES DAILY PRN
COMMUNITY

## 2023-11-16 RX ORDER — WARFARIN SODIUM 5 MG/1
5 TABLET ORAL DAILY
COMMUNITY

## 2023-11-16 RX ORDER — FUROSEMIDE 10 MG/ML
40 INJECTION INTRAMUSCULAR; INTRAVENOUS ONCE
Status: COMPLETED | OUTPATIENT
Start: 2023-11-16 | End: 2023-11-16

## 2023-11-16 RX ORDER — AMIODARONE HYDROCHLORIDE 200 MG/1
200 TABLET ORAL DAILY
Status: DISCONTINUED | OUTPATIENT
Start: 2023-11-17 | End: 2023-11-20

## 2023-11-16 RX ORDER — MONTELUKAST SODIUM 10 MG/1
10 TABLET ORAL NIGHTLY
Status: DISCONTINUED | OUTPATIENT
Start: 2023-11-16 | End: 2023-11-28

## 2023-11-16 RX ORDER — TEMAZEPAM 15 MG/1
15 CAPSULE ORAL NIGHTLY PRN
Status: DISCONTINUED | OUTPATIENT
Start: 2023-11-16 | End: 2023-11-28

## 2023-11-16 RX ORDER — CLOPIDOGREL BISULFATE 75 MG/1
75 TABLET ORAL DAILY
Status: DISCONTINUED | OUTPATIENT
Start: 2023-11-16 | End: 2023-11-21

## 2023-11-16 RX ORDER — FLUTICASONE PROPIONATE 50 MCG
2 SPRAY, SUSPENSION (ML) NASAL DAILY
COMMUNITY

## 2023-11-16 RX ORDER — METOCLOPRAMIDE HYDROCHLORIDE 5 MG/ML
5 INJECTION INTRAMUSCULAR; INTRAVENOUS EVERY 8 HOURS PRN
Status: DISCONTINUED | OUTPATIENT
Start: 2023-11-16 | End: 2023-11-28

## 2023-11-16 RX ORDER — LISINOPRIL 2.5 MG/1
2.5 TABLET ORAL DAILY
COMMUNITY
End: 2023-11-28

## 2023-11-16 RX ORDER — ACETAMINOPHEN 500 MG
500 TABLET ORAL EVERY 4 HOURS PRN
Status: DISCONTINUED | OUTPATIENT
Start: 2023-11-16 | End: 2023-11-28

## 2023-11-16 RX ORDER — ATORVASTATIN CALCIUM 80 MG/1
80 TABLET, FILM COATED ORAL NIGHTLY
Status: DISCONTINUED | OUTPATIENT
Start: 2023-11-16 | End: 2023-11-28

## 2023-11-16 RX ORDER — PREGABALIN 75 MG/1
150 CAPSULE ORAL 3 TIMES DAILY
Status: DISCONTINUED | OUTPATIENT
Start: 2023-11-17 | End: 2023-11-16

## 2023-11-16 RX ORDER — DEXTROSE MONOHYDRATE 25 G/50ML
50 INJECTION, SOLUTION INTRAVENOUS
Status: DISCONTINUED | OUTPATIENT
Start: 2023-11-16 | End: 2023-11-28

## 2023-11-16 RX ORDER — FUROSEMIDE 10 MG/ML
40 INJECTION INTRAMUSCULAR; INTRAVENOUS
Status: DISCONTINUED | OUTPATIENT
Start: 2023-11-16 | End: 2023-11-18

## 2023-11-16 RX ORDER — PREGABALIN 75 MG/1
150 CAPSULE ORAL 3 TIMES DAILY
Status: DISCONTINUED | OUTPATIENT
Start: 2023-11-16 | End: 2023-11-28

## 2023-11-16 RX ORDER — NICOTINE POLACRILEX 4 MG
30 LOZENGE BUCCAL
Status: DISCONTINUED | OUTPATIENT
Start: 2023-11-16 | End: 2023-11-28

## 2023-11-16 NOTE — H&P
[facility]    PATIENT'S NAME: Man Barr   ATTENDING PHYSICIAN: Larissa Goldberg. Dewayne Gomez MD   PATIENT ACCOUNT#:   [id number]    LOCATION:  [place] [address]  MEDICAL RECORD #:   [id number]       YOB: 1945  ADMISSION DATE:       11/16/2023    HISTORY AND PHYSICAL EXAMINATION    CHIEF COMPLAINT:  Acute hypoxemic respiratory failure. HISTORY OF PRESENT ILLNESS:  The patient is a 53-year-old  male who lives at [facility], brought in today for evaluation of progressive shortness of breath and hypoxemia. He does use oxygen only at night, but he has been required oxygen all day long for last 2 to 3 days. Upon arrival to the emergency room, his pulse ox was 88% on room air, 4L nasal cannula 97%. Chemistry was unremarkable. GFR is 35 at baseline. Bicarbonate 35, potassium 3.3, BUN and creatinine of 17 and 1.96. Troponin was negative. ProBNP 453. GeneXpert viral panel was negative. INR supratherapeutic at 6.15. Chest x-ray showed cardiomegaly with possible left retrocardiac opacity. Procalcitonin still pending. He was started on IV Lasix, and he will be admitted to the hospital for further management. PAST MEDICAL HISTORY:  Cerebrovascular accident; gastroesophageal reflux disease; coronary artery disease; multiple PCI stents to the LAD and circumflex system; diabetes mellitus type 2, insulin dependent; chronic kidney disease stage 3 on basis of diabetic nephropathy; chronic left ventricular diastolic dysfunction, ejection fraction 45% to 55%. Last echocardiogram on July 30, 2023 at [facility].  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   He had mild to moderate peripheral arterial disease, history of aspiration pneumonia from dysphagia requiring temporary G-tube. Patient had esophagogram done at [facility] 2 months ago which showed no evidence of aspiration. He had history of Parkinson disease with orthostatic hypotension and gait instability with history of multiple falls. Chronic atrial fibrillation. According to [facility] records, he is anticoagulated with Coumadin. PAST SURGICAL HISTORY:  As mentioned above. Also had abdominal wall abscess secondary to G-tube insertion site requiring I and D procedure, lumbar laminectomy and fusion, bilateral shoulder rotator cuff repair, left knee surgery, and cholecystectomy. MEDICATIONS:  Please see medication reconciliation list.      ALLERGIES:  NSAIDs. Also Ancef is reported. FAMILY HISTORY:  Positive for hypertension and coronary artery disease. SOCIAL HISTORY:  No tobacco, alcohol, or drug use. Lives at [facility]. Uses a walker. Uses oxygen at night. REVIEW OF SYSTEMS:  Patient reports orthopnea, dyspnea on exertion, increased leg edema, cough productive of clear phlegm. EKG today showed ventricular paced rhythm. He denies any fever or chills. No recent choking on his food. Other 12-point review of systems is negative. PHYSICAL EXAMINATION:    GENERAL:  Alert and oriented to time, place, and person. Moderate distress, visibly dyspneic. VITAL SIGNS:  Temperature 97.3, pulse 79, respiratory rate 22, blood pressure 122/71, pulse ox 97% on room air. HEENT:  Atraumatic. Oropharynx clear. Moist mucous membranes. Normal hard and soft palate. Eyes:  Anicteric sclerae. Pupils equal, round, reactive. NECK:  Supple. No lymphadenopathy. Trachea midline. Positive jugular venous distention. LUNGS:  Clear to auscultation bilaterally. Normal respiratory effort. Diminishing breath sounds both lung bases. HEART:  Regular rate, rhythm. S1 and S2 auscultated. Paced rhythm. ABDOMEN:  Soft, nondistended. No tenderness.   Positive bowel sounds. EXTREMITIES:  Edema +2 both legs. No clubbing or cyanosis. NEUROLOGIC:  Motor and sensory intact. ASSESSMENT:    1. Acute hypoxemic respiratory failure, possible related to fluid overload and heart failure. 2.   History of aspiration with retrocardiac opacity on chest x-ray. Rule out pneumonia. Procalcitonin still pending. Hold on using antibiotics until then. 3.   Chronic kidney disease stage 3.  4.   Diabetes mellitus type 2.  5.   Hypokalemia. 6.   Supratherapeutic INR. PLAN:  Patient will be admitted to telemetry floor. IV Lasix. 2D echocardiogram with Doppler. Cardiology and pulmonary consult. Follow up on Procalcitonin level. Hold warfarin and repeat PT, INR in the morning. Monitor kidney function. Continue oxygen support. Monitor respiratory status.     Dictated By Amy Boles MD  d: 11/16/2023 16:52:21  t: 11/16/2023 17:19:22  Job [id number]  TS/

## 2023-11-16 NOTE — ED INITIAL ASSESSMENT (HPI)
Pt arrived via [facility] from [facility], with chest pain, and sob. Pt states that he does not want to be here, pt states that he wants to be transferred to [facility] doctor speaking to him he agreed to be checked out. Pt reports he is on o2 at night but states this morning he was stat's at 82%. Pt is on 4LNC at 93%. Pt reports Chest pain 7/10.

## 2023-11-16 NOTE — ED QUICK NOTES
Orders for admission, patient is aware of plan and ready to go upstairs. Any questions, please call ED RN Kendall Nagel at [phone].      Patient Covid vaccination status: Unvaccinated     COVID Test Ordered in ED: SARS-CoV-2/Flu A and B/RSV by PCR (GeneXpert)    COVID Suspicion at Admission: N/A    Running Infusions:  None    Mental Status/LOC at time of transport: A/Ox4  4LNC    Other pertinent information:   CIWA score: N/A   NIH score:  N/A

## 2023-11-17 ENCOUNTER — APPOINTMENT (OUTPATIENT)
Dept: CT IMAGING | Facility: HOSPITAL | Age: 78
DRG: 286 | End: 2023-11-17
Attending: HOSPITALIST
Payer: MEDICARE

## 2023-11-17 LAB
ANION GAP SERPL CALC-SCNC: 6 MMOL/L (ref 0–18)
BASOPHILS # BLD AUTO: 0.02 X10(3) UL (ref 0–0.2)
BASOPHILS NFR BLD AUTO: 0.3 %
BUN BLD-MCNC: 15 MG/DL (ref 9–23)
BUN/CREAT SERPL: 8 (ref 10–20)
CALCIUM BLD-MCNC: 9.3 MG/DL (ref 8.7–10.4)
CHLORIDE SERPL-SCNC: 102 MMOL/L (ref 98–112)
CHOLEST SERPL-MCNC: 95 MG/DL (ref ?–200)
CO2 SERPL-SCNC: 35 MMOL/L (ref 21–32)
CREAT BLD-MCNC: 1.87 MG/DL
DEPRECATED RDW RBC AUTO: 52.5 FL (ref 35.1–46.3)
EGFRCR SERPLBLD CKD-EPI 2021: 37 ML/MIN/1.73M2 (ref 60–?)
EOSINOPHIL # BLD AUTO: 0.17 X10(3) UL (ref 0–0.7)
EOSINOPHIL NFR BLD AUTO: 2.2 %
ERYTHROCYTE [DISTWIDTH] IN BLOOD BY AUTOMATED COUNT: 14.8 % (ref 11–15)
GLUCOSE BLD-MCNC: 125 MG/DL (ref 70–99)
GLUCOSE BLDC GLUCOMTR-MCNC: 120 MG/DL (ref 70–99)
GLUCOSE BLDC GLUCOMTR-MCNC: 170 MG/DL (ref 70–99)
GLUCOSE BLDC GLUCOMTR-MCNC: 181 MG/DL (ref 70–99)
GLUCOSE BLDC GLUCOMTR-MCNC: 187 MG/DL (ref 70–99)
GLUCOSE BLDC GLUCOMTR-MCNC: 188 MG/DL (ref 70–99)
HCT VFR BLD AUTO: 33.9 %
HDLC SERPL-MCNC: 33 MG/DL (ref 40–59)
HGB BLD-MCNC: 10.2 G/DL
IMM GRANULOCYTES # BLD AUTO: 0.02 X10(3) UL (ref 0–1)
IMM GRANULOCYTES NFR BLD: 0.3 %
INR BLD: 5.33 (ref 0.8–1.2)
LDLC SERPL CALC-MCNC: 46 MG/DL (ref ?–100)
LYMPHOCYTES # BLD AUTO: 0.92 X10(3) UL (ref 1–4)
LYMPHOCYTES NFR BLD AUTO: 12 %
MCH RBC QN AUTO: 29 PG (ref 26–34)
MCHC RBC AUTO-ENTMCNC: 30.1 G/DL (ref 31–37)
MCV RBC AUTO: 96.3 FL
MONOCYTES # BLD AUTO: 0.84 X10(3) UL (ref 0.1–1)
MONOCYTES NFR BLD AUTO: 11 %
NEUTROPHILS # BLD AUTO: 5.7 X10 (3) UL (ref 1.5–7.7)
NEUTROPHILS # BLD AUTO: 5.7 X10(3) UL (ref 1.5–7.7)
NEUTROPHILS NFR BLD AUTO: 74.2 %
NONHDLC SERPL-MCNC: 62 MG/DL (ref ?–130)
OSMOLALITY SERPL CALC.SUM OF ELEC: 298 MOSM/KG (ref 275–295)
PLATELET # BLD AUTO: 219 10(3)UL (ref 150–450)
POTASSIUM SERPL-SCNC: 3.7 MMOL/L (ref 3.5–5.1)
POTASSIUM SERPL-SCNC: 3.7 MMOL/L (ref 3.5–5.1)
PROTHROMBIN TIME: 52.1 SECONDS (ref 11.6–14.8)
RBC # BLD AUTO: 3.52 X10(6)UL
SODIUM SERPL-SCNC: 143 MMOL/L (ref 136–145)
TRIGL SERPL-MCNC: 77 MG/DL (ref 30–149)
TROPONIN I SERPL HS-MCNC: 68 NG/L
VLDLC SERPL CALC-MCNC: 11 MG/DL (ref 0–30)
WBC # BLD AUTO: 7.7 X10(3) UL (ref 4–11)

## 2023-11-17 PROCEDURE — 71250 CT THORAX DX C-: CPT | Performed by: HOSPITALIST

## 2023-11-17 PROCEDURE — 99233 SBSQ HOSP IP/OBS HIGH 50: CPT | Performed by: HOSPITALIST

## 2023-11-17 PROCEDURE — 99223 1ST HOSP IP/OBS HIGH 75: CPT | Performed by: INTERNAL MEDICINE

## 2023-11-17 RX ORDER — LISINOPRIL 2.5 MG/1
2.5 TABLET ORAL DAILY
Status: DISCONTINUED | OUTPATIENT
Start: 2023-11-17 | End: 2023-11-28

## 2023-11-17 RX ORDER — IPRATROPIUM BROMIDE AND ALBUTEROL SULFATE 2.5; .5 MG/3ML; MG/3ML
3 SOLUTION RESPIRATORY (INHALATION)
Status: DISCONTINUED | OUTPATIENT
Start: 2023-11-17 | End: 2023-11-18

## 2023-11-17 RX ORDER — MIDODRINE HYDROCHLORIDE 5 MG/1
10 TABLET ORAL 3 TIMES DAILY PRN
Status: DISCONTINUED | OUTPATIENT
Start: 2023-11-17 | End: 2023-11-28

## 2023-11-17 RX ORDER — FLUTICASONE FUROATE AND VILANTEROL 100; 25 UG/1; UG/1
1 POWDER RESPIRATORY (INHALATION) DAILY
Status: DISCONTINUED | OUTPATIENT
Start: 2023-11-17 | End: 2023-11-28

## 2023-11-17 RX ORDER — CYCLOBENZAPRINE HCL 5 MG
5 TABLET ORAL 3 TIMES DAILY PRN
Status: DISCONTINUED | OUTPATIENT
Start: 2023-11-17 | End: 2023-11-28

## 2023-11-17 RX ORDER — PANTOPRAZOLE SODIUM 40 MG/1
40 TABLET, DELAYED RELEASE ORAL
Status: DISCONTINUED | OUTPATIENT
Start: 2023-11-17 | End: 2023-11-28

## 2023-11-17 RX ORDER — DOCUSATE SODIUM 100 MG/1
100 CAPSULE, LIQUID FILLED ORAL 2 TIMES DAILY
Status: DISCONTINUED | OUTPATIENT
Start: 2023-11-17 | End: 2023-11-19

## 2023-11-17 RX ORDER — MELATONIN
325
Status: DISCONTINUED | OUTPATIENT
Start: 2023-11-17 | End: 2023-11-28

## 2023-11-17 RX ORDER — IPRATROPIUM BROMIDE AND ALBUTEROL SULFATE 2.5; .5 MG/3ML; MG/3ML
3 SOLUTION RESPIRATORY (INHALATION) 3 TIMES DAILY
Status: DISCONTINUED | OUTPATIENT
Start: 2023-11-17 | End: 2023-11-17

## 2023-11-17 RX ORDER — FINASTERIDE 5 MG/1
5 TABLET, FILM COATED ORAL DAILY
Status: DISCONTINUED | OUTPATIENT
Start: 2023-11-17 | End: 2023-11-28

## 2023-11-17 RX ORDER — FLUTICASONE PROPIONATE 50 MCG
2 SPRAY, SUSPENSION (ML) NASAL DAILY
Status: DISCONTINUED | OUTPATIENT
Start: 2023-11-17 | End: 2023-11-28

## 2023-11-17 NOTE — CM/SW NOTE
11/17/23 1000   CM/SW Referral Data   Referral Source Social Work (self-referral)   Reason for Referral Discharge planning   Informant Patient   Medical Hx   Does patient have an established PCP? No   Patient Info   Advanced directives? Yes  (Daughter Zuleima Mills [phone])   Patient's Current Mental Status at Time of Assessment Alert;Oriented   Patient's Home Environment Assisted Living   Post Acute Care Provider Upon Admission   [facility])   Patient lives with Alone   Patient Status Prior to Admission   Independent with ADLs and Mobility No   Pt. requires assistance with Driving; Ambulating   Services in place prior to admission DME/Supplies at home   DME Provider/Supplier Total   Type of DME/Supplies Straight Cane;Standard Walker   Discharge Needs   Anticipated D/C needs No anticipated discharge needs     SW met w/pt bedside for above assessment. Pt endorses that he resides alone at [facility] on the assisted side. Pt reports that at baseline he uses a cane to ambulate and does not drive. Pt has HH hx, but declines services even if recommended. Pt endorses that he uses O2 via [facility] 3L baseline. Pt requested portable tank. SW delivered bedside per [place] @ [facility]. Pt reports that he will need transport back to [facility] at discharge. SW/CM to remain available for support and/or discharge planning.       LEO Ram, [facility]  Social Work   GGE:#[id number]

## 2023-11-17 NOTE — PLAN OF CARE
Problem: Patient Centered Care  Goal: Patient preferences are identified and integrated in the patient's plan of care  Description: Interventions:  - What would you like us to know as we care for you?  Patient is from [facility].   - Provide timely, complete, and accurate information to patient/family  - Incorporate patient and family knowledge, values, beliefs, and cultural backgrounds into the planning and delivery of care  - Encourage patient/family to participate in care and decision-making at the level they choose  - Honor patient and family perspectives and choices  Outcome: Progressing     Problem: Diabetes/Glucose Control  Goal: Glucose maintained within prescribed range  Description: INTERVENTIONS:  - Monitor Blood Glucose as ordered  - Assess for signs and symptoms of hyperglycemia and hypoglycemia  - Administer ordered medications to maintain glucose within target range  - Assess barriers to adequate nutritional intake and initiate nutrition consult as needed  - Instruct patient on self management of diabetes  Outcome: Progressing     Problem: Patient/Family Goals  Goal: Patient/Family Long Term Goal  Description: Patient's Long Term Goal: To go home    Interventions:    - See additional Care Plan goals for specific interventions  Outcome: Progressing  Goal: Patient/Family Short Term Goal  Description: Patient's Short Term Goal: to feel better     Interventions:   - See additional Care Plan goals for specific interventions  Outcome: Progressing     Problem: CARDIOVASCULAR - ADULT  Goal: Maintains optimal cardiac output and hemodynamic stability  Description: INTERVENTIONS:  - Monitor vital signs, rhythm, and trends  - Monitor for bleeding, hypotension and signs of decreased cardiac output  - Evaluate effectiveness of vasoactive medications to optimize hemodynamic stability  - Monitor arterial and/or venous puncture sites for bleeding and/or hematoma  - Assess quality of pulses, skin color and temperature  - Assess for signs of decreased coronary artery perfusion - ex.  Angina  - Evaluate fluid balance, assess for edema, trend weights  Outcome: Progressing  Goal: Absence of cardiac arrhythmias or at baseline  Description: INTERVENTIONS:  - Continuous cardiac monitoring, monitor vital signs, obtain 12 lead EKG if indicated  - Evaluate effectiveness of antiarrhythmic and heart rate control medications as ordered  - Initiate emergency measures for life threatening arrhythmias  - Monitor electrolytes and administer replacement therapy as ordered  Outcome: Progressing     Problem: RESPIRATORY - ADULT  Goal: Achieves optimal ventilation and oxygenation  Description: INTERVENTIONS:  - Assess for changes in respiratory status  - Assess for changes in mentation and behavior  - Position to facilitate oxygenation and minimize respiratory effort  - Oxygen supplementation based on oxygen saturation or ABGs  - Provide Smoking Cessation handout, if applicable  - Encourage broncho-pulmonary hygiene including cough, deep breathe, Incentive Spirometry  - Assess the need for suctioning and perform as needed  - Assess and instruct to report SOB or any respiratory difficulty  - Respiratory Therapy support as indicated  - Manage/alleviate anxiety  - Monitor for signs/symptoms of CO2 retention  Outcome: Progressing     Problem: METABOLIC/FLUID AND ELECTROLYTES - ADULT  Goal: Glucose maintained within prescribed range  Description: INTERVENTIONS:  - Monitor Blood Glucose as ordered  - Assess for signs and symptoms of hyperglycemia and hypoglycemia  - Administer ordered medications to maintain glucose within target range  - Assess barriers to adequate nutritional intake and initiate nutrition consult as needed  - Instruct patient on self management of diabetes  Outcome: Progressing  Goal: Electrolytes maintained within normal limits  Description: INTERVENTIONS:  - Monitor labs and rhythm and assess patient for signs and symptoms of electrolyte imbalances  - Administer electrolyte replacement as ordered  - Monitor response to electrolyte replacements, including rhythm and repeat lab results as appropriate  - Fluid restriction as ordered  - Instruct patient on fluid and nutrition restrictions as appropriate  Outcome: Progressing   Patient came to ED d/t progressive SOB and hypoxia. Patient was 80% on room air at [facility]. Patient placed on 4L in ED. Patient admitted for acute respiratory failure related to fluid overload and heart failure. IV lasix started. 2Decho in morning. INR 6.5, coumadin on hold and labs ordered for morning. VSS. Patient resting comfortably with no complaints. Will continue to monitor.

## 2023-11-17 NOTE — PLAN OF CARE
Pt alert and oriented x4. Pt on 4L of oxygen. IV lasix and antibiotics per orders. Pt completed CT chest. Coumadin on hold. SLP saw patient. Pt ambulating sba. Plan for cath on Monday. Problem: Patient Centered Care  Goal: Patient preferences are identified and integrated in the patient's plan of care  Description: Interventions:  - What would you like us to know as we care for you?  [facility]   - Provide timely, complete, and accurate information to patient/family  - Incorporate patient and family knowledge, values, beliefs, and cultural backgrounds into the planning and delivery of care  - Encourage patient/family to participate in care and decision-making at the level they choose  - Honor patient and family perspectives and choices  Outcome: Progressing     Problem: Diabetes/Glucose Control  Goal: Glucose maintained within prescribed range  Description: INTERVENTIONS:  - Monitor Blood Glucose as ordered  - Assess for signs and symptoms of hyperglycemia and hypoglycemia  - Administer ordered medications to maintain glucose within target range  - Assess barriers to adequate nutritional intake and initiate nutrition consult as needed  - Instruct patient on self management of diabetes  Outcome: Progressing     Problem: Patient/Family Goals  Goal: Patient/Family Long Term Goal  Description: Patient's Long Term Goal: Discharge to [facility]    Interventions:  - Monitor vs, assess sob, O2 per protocol  - See additional Care Plan goals for specific interventions  Outcome: Progressing  Goal: Patient/Family Short Term Goal  Description: Patient's Short Term Goal: Manage SOB    Interventions:   - assess lung sounds, dw, I&O, FR  - See additional Care Plan goals for specific interventions  Outcome: Progressing     Problem: CARDIOVASCULAR - ADULT  Goal: Maintains optimal cardiac output and hemodynamic stability  Description: INTERVENTIONS:  - Monitor vital signs, rhythm, and trends  - Monitor for bleeding, hypotension and signs of decreased cardiac output  - Evaluate effectiveness of vasoactive medications to optimize hemodynamic stability  - Monitor arterial and/or venous puncture sites for bleeding and/or hematoma  - Assess quality of pulses, skin color and temperature  - Assess for signs of decreased coronary artery perfusion - ex.  Angina  - Evaluate fluid balance, assess for edema, trend weights  Outcome: Progressing  Goal: Absence of cardiac arrhythmias or at baseline  Description: INTERVENTIONS:  - Continuous cardiac monitoring, monitor vital signs, obtain 12 lead EKG if indicated  - Evaluate effectiveness of antiarrhythmic and heart rate control medications as ordered  - Initiate emergency measures for life threatening arrhythmias  - Monitor electrolytes and administer replacement therapy as ordered  Outcome: Progressing     Problem: RESPIRATORY - ADULT  Goal: Achieves optimal ventilation and oxygenation  Description: INTERVENTIONS:  - Assess for changes in respiratory status  - Assess for changes in mentation and behavior  - Position to facilitate oxygenation and minimize respiratory effort  - Oxygen supplementation based on oxygen saturation or ABGs  - Provide Smoking Cessation handout, if applicable  - Encourage broncho-pulmonary hygiene including cough, deep breathe, Incentive Spirometry  - Assess the need for suctioning and perform as needed  - Assess and instruct to report SOB or any respiratory difficulty  - Respiratory Therapy support as indicated  - Manage/alleviate anxiety  - Monitor for signs/symptoms of CO2 retention  Outcome: Progressing     Problem: METABOLIC/FLUID AND ELECTROLYTES - ADULT  Goal: Glucose maintained within prescribed range  Description: INTERVENTIONS:  - Monitor Blood Glucose as ordered  - Assess for signs and symptoms of hyperglycemia and hypoglycemia  - Administer ordered medications to maintain glucose within target range  - Assess barriers to adequate nutritional intake and initiate nutrition consult as needed  - Instruct patient on self management of diabetes  Outcome: Progressing  Goal: Electrolytes maintained within normal limits  Description: INTERVENTIONS:  - Monitor labs and rhythm and assess patient for signs and symptoms of electrolyte imbalances  - Administer electrolyte replacement as ordered  - Monitor response to electrolyte replacements, including rhythm and repeat lab results as appropriate  - Fluid restriction as ordered  - Instruct patient on fluid and nutrition restrictions as appropriate  Outcome: Progressing

## 2023-11-17 NOTE — SLP NOTE
ADULT SWALLOWING EVALUATION    ASSESSMENT    ASSESSMENT/OVERALL IMPRESSION:  PPE REQUIRED. THIS THERAPIST WORE GLOVES AND MASK FOR DURATION OF EVALUATION. HANDS WASHED UPON ENTRANCE/EXIT. Per MD note, \"The patient is a 70-year-old  male who lives at [facility], brought in today for evaluation of progressive shortness of breath and hypoxemia. He does use oxygen only at night, but he has been required oxygen all day long for last 2 to 3 days. Upon arrival to the emergency room, his pulse ox was 88% on room air, 4L nasal cannula 97%. \"    SLP BSSE orders received and acknowledged. A swallow evaluation warranted to r/o aspiration. Pt afebrile with clear vocal quality, on 4L/Min, with oxygen saturation at 100%. Pt with hx of dysphagia at [facility], swallow f/u 7/1/20 with recommendations for regular/mildly thick liquids. VFSS at [facility] on 8/1/23 with penetration of thin liquids and no aspiration. Pt positioned 90 degrees in bed, alert/cooperative. Pt with no complaints of pain. Oral motor examination revealed reduced left sided strength, ROM, and rate of motion 2/2 polio per pt. Pt presented with trials of hard solids and thin liquids via cup. Pt with adequate oral acceptance and bilabial seal across all trials. Pt with intact bite, mastication of solids, and timely A-P transit. Pt's swallow response appears delayed with reduced hyolaryngeal elevation/excursion. 1x wet vocal quality observed which cleared with cue to throat clear. No other clinical signs of aspiration (e.g., immediate/delayed throat clear, immediate/delayed cough, increased O2 effort) observed across all trials. 11/16 CXR indicates \"Left retrocardiac opacity. Finding may be secondary to atelectasis or pneumonia. Small left pleural effusion. Mild cardiomegaly, unchanged\". Oxygen status remained stable t/o the entire evaluation. At this time, pt presents with functional oral swallow stage and probable pharyngeal dysfunction.  Recommend a regular diet and thin liquids with strict adherence to safe swallowing compensatory strategies. Results and recommendations reviewed with RN, pt. Pt v/u to all results/recommendations. Recommendations remain written on whiteboard. SLP collaborated with RN for MD diet orders. PLAN: SLP to f/u x1-2 meal assessments, monitor imaging, and VFSS if clinically indicated         RECOMMENDATIONS   Diet Recommendations - Solids: Regular  Diet Recommendations - Liquids: Thin Liquids                        Compensatory Strategies Recommended: No straws; Slow rate;Small bites and sips; Chin tuck  Aspiration Precautions: Upright position; Slow rate;Small bites and sips; No straw  Medication Administration Recommendations:  (as tolerated)  Treatment Plan/Recommendations: Aspiration precautions  Discharge Recommendations/Plan: Undetermined    HISTORY   MEDICAL HISTORY  Reason for Referral: R/O aspiration    Problem List  Principal Problem:    Acute on chronic congestive heart failure, unspecified heart failure type ([facility]  Active Problems:    Hypoxia    Acute hypoxemic respiratory failure ([facility]      Past Medical History  Past Medical History:   Diagnosis Date    Asthma     Back problem     Calculus of kidney     Cataract     Congestive heart disease (HCC)     COPD (chronic obstructive pulmonary disease) (HCC)     Deep vein thrombosis (HCC)     Diabetes ([facility]     Esophageal reflux     Heart attack ([facility]     Osteoarthritis     Stroke [facility])        Prior Living Situation: Assisted living  Diet Prior to Admission: Regular; Thin liquids  Precautions: Aspiration    Patient/Family Goals: did not state    SWALLOWING HISTORY  Current Diet Consistency: Regular; Thin liquids  Dysphagia History: see above  Imaging Results: 11/17 CT CHEST  CONCLUSION:   1. Diffuse ground-glass attenuation abnormality in the right lung with some lower lung peribronchovascular micro nodules. Less pronounced findings in the aerated portion of the left lung. Differential considerations include edema, hemorrhage, atypical   infection/viral pneumonia, and less likely pulmonary toxicity related to amiodarone. 2. Aspirated food contents or mucous plugging occluding left mainstem bronchus with atelectasis and or pneumonia in the lingula. 3. Previous left lower lobectomy. 4. Small to moderate pericardial effusion. 5. Coronary artery calcification with stents. 6. Enlarged main pulmonary artery suggests pulmonary hypertension. SUBJECTIVE       OBJECTIVE   ORAL MOTOR EXAMINATION  Dentition: Functional  Symmetry: Reduced left facial (per pt 2/2 polio)  Strength: Reduced left facial     Range of Motion: Reduced left facial  Rate of Motion: Reduced       Respiratory Status: Supplemental O2;Nasal cannula  Consistencies Trialed: Thin liquids; Hard solid  Method of Presentation: Self presentation;Cup  Patient Positioning: Upright;Midline    Oral Phase of Swallow: Within Functional Limits                      Pharyngeal Phase of Swallow: Impaired  Laryngeal Elevation Timing: Appears impaired  Laryngeal Elevation Strength: Appears impaired  Laryngeal Elevation Coordination: Appears intact  (Please note: Silent aspiration cannot be evaluated clinically. Videofluoroscopic Swallow Study is required to rule-out silent aspiration.)    Esophageal Phase of Swallow: No complaints consistent with possible esophageal involvement  Comments: NA              GOALS  Goal #1 The patient will tolerate regular consistency and thin liquids without overt signs or symptoms of aspiration with 100 % accuracy over 1-2 session(s). In Progress   Goal #2 The patient/family/caregiver will demonstrate understanding and implementation of aspiration precautions and swallow strategies independently over 1-2 session(s).     In Progress   Goal #3 The patient will utilize compensatory strategies as outlined by  BSSE (clinical evaluation) including Slow rate, Small bites, Small sips, No straws, Swallow/throat clear/swallow again, Upright 90 degrees, Upright 90 degrees 30 mins after meal, Eliminate distractions with min assistance 100 % of the time across 2 sessions.   In Progress     FOLLOW UP  Treatment Plan/Recommendations: Aspiration precautions  Number of Visits to Meet Established Goals:  (1-2)  Follow Up Needed (Documentation Required): Yes  SLP Follow-up Date: 11/20/23    Thank you for your referral.   If you have any questions, please contact Magdy De La Cruz, JANI Kern. Florecita Mcgregor Pathologist  Phone Number [phone]

## 2023-11-17 NOTE — DIETARY NOTE
NUTRITION EDUCATION NOTE     Received consult for cardiac nutrition education. Verbally reviewed basic cardiac diet restrictions. Provided with Eating Heart-Healthy and NCM handout to reinforce. Receptive to instruction. Pt is already limiting sodium and sugars, pt was very receptive to information. Expect good compliance.       Efrain Spangler   Dietetic Intern  N:[phone]

## 2023-11-18 ENCOUNTER — APPOINTMENT (OUTPATIENT)
Dept: CV DIAGNOSTICS | Facility: HOSPITAL | Age: 78
DRG: 286 | End: 2023-11-18
Attending: HOSPITALIST
Payer: MEDICARE

## 2023-11-18 LAB
ALBUMIN SERPL-MCNC: 3.1 G/DL (ref 3.2–4.8)
ALBUMIN/GLOB SERPL: 1.2 {RATIO} (ref 1–2)
ALP LIVER SERPL-CCNC: 57 U/L
ALT SERPL-CCNC: <7 U/L
ANION GAP SERPL CALC-SCNC: 2 MMOL/L (ref 0–18)
ANION GAP SERPL CALC-SCNC: 5 MMOL/L (ref 0–18)
AST SERPL-CCNC: 11 U/L (ref ?–34)
BILIRUB SERPL-MCNC: 0.8 MG/DL (ref 0.2–1.1)
BUN BLD-MCNC: 20 MG/DL (ref 9–23)
BUN BLD-MCNC: 21 MG/DL (ref 9–23)
BUN/CREAT SERPL: 10.1 (ref 10–20)
BUN/CREAT SERPL: 9.6 (ref 10–20)
CALCIUM BLD-MCNC: 8.4 MG/DL (ref 8.7–10.4)
CALCIUM BLD-MCNC: 8.9 MG/DL (ref 8.7–10.4)
CHLORIDE SERPL-SCNC: 101 MMOL/L (ref 98–112)
CHLORIDE SERPL-SCNC: 98 MMOL/L (ref 98–112)
CO2 SERPL-SCNC: 37 MMOL/L (ref 21–32)
CO2 SERPL-SCNC: 39 MMOL/L (ref 21–32)
CREAT BLD-MCNC: 2.07 MG/DL
CREAT BLD-MCNC: 2.09 MG/DL
DEPRECATED RDW RBC AUTO: 51.3 FL (ref 35.1–46.3)
DEPRECATED RDW RBC AUTO: 51.8 FL (ref 35.1–46.3)
EGFRCR SERPLBLD CKD-EPI 2021: 32 ML/MIN/1.73M2 (ref 60–?)
EGFRCR SERPLBLD CKD-EPI 2021: 33 ML/MIN/1.73M2 (ref 60–?)
ERYTHROCYTE [DISTWIDTH] IN BLOOD BY AUTOMATED COUNT: 14.7 % (ref 11–15)
ERYTHROCYTE [DISTWIDTH] IN BLOOD BY AUTOMATED COUNT: 14.8 % (ref 11–15)
GLOBULIN PLAS-MCNC: 2.6 G/DL (ref 2.8–4.4)
GLUCOSE BLD-MCNC: 151 MG/DL (ref 70–99)
GLUCOSE BLD-MCNC: 184 MG/DL (ref 70–99)
GLUCOSE BLDC GLUCOMTR-MCNC: 119 MG/DL (ref 70–99)
GLUCOSE BLDC GLUCOMTR-MCNC: 133 MG/DL (ref 70–99)
GLUCOSE BLDC GLUCOMTR-MCNC: 148 MG/DL (ref 70–99)
GLUCOSE BLDC GLUCOMTR-MCNC: 173 MG/DL (ref 70–99)
GLUCOSE BLDC GLUCOMTR-MCNC: 193 MG/DL (ref 70–99)
HCT VFR BLD AUTO: 30.3 %
HCT VFR BLD AUTO: 32.7 %
HGB BLD-MCNC: 10 G/DL
HGB BLD-MCNC: 9.3 G/DL
INR BLD: 4.8 (ref 0.8–1.2)
INR BLD: 7.22 (ref 0.8–1.2)
INR BLD: 8.6 (ref 0.8–1.2)
LACTATE SERPL-SCNC: 1.1 MMOL/L (ref 0.5–2)
MCH RBC QN AUTO: 29.1 PG (ref 26–34)
MCH RBC QN AUTO: 29.2 PG (ref 26–34)
MCHC RBC AUTO-ENTMCNC: 30.6 G/DL (ref 31–37)
MCHC RBC AUTO-ENTMCNC: 30.7 G/DL (ref 31–37)
MCV RBC AUTO: 94.7 FL
MCV RBC AUTO: 95.6 FL
OSMOLALITY SERPL CALC.SUM OF ELEC: 298 MOSM/KG (ref 275–295)
OSMOLALITY SERPL CALC.SUM OF ELEC: 300 MOSM/KG (ref 275–295)
PLATELET # BLD AUTO: 207 10(3)UL (ref 150–450)
PLATELET # BLD AUTO: 216 10(3)UL (ref 150–450)
POTASSIUM SERPL-SCNC: 3.2 MMOL/L (ref 3.5–5.1)
POTASSIUM SERPL-SCNC: 3.6 MMOL/L (ref 3.5–5.1)
POTASSIUM SERPL-SCNC: 3.7 MMOL/L (ref 3.5–5.1)
PROT SERPL-MCNC: 5.7 G/DL (ref 5.7–8.2)
PROTHROMBIN TIME: 48 SECONDS (ref 11.6–14.8)
PROTHROMBIN TIME: 66.2 SECONDS (ref 11.6–14.8)
PROTHROMBIN TIME: 76.1 SECONDS (ref 11.6–14.8)
RBC # BLD AUTO: 3.2 X10(6)UL
RBC # BLD AUTO: 3.42 X10(6)UL
SODIUM SERPL-SCNC: 140 MMOL/L (ref 136–145)
SODIUM SERPL-SCNC: 142 MMOL/L (ref 136–145)
TROPONIN I SERPL HS-MCNC: 49 NG/L
TROPONIN I SERPL HS-MCNC: 53 NG/L
WBC # BLD AUTO: 5 X10(3) UL (ref 4–11)
WBC # BLD AUTO: 6 X10(3) UL (ref 4–11)

## 2023-11-18 PROCEDURE — 93306 TTE W/DOPPLER COMPLETE: CPT | Performed by: HOSPITALIST

## 2023-11-18 PROCEDURE — 93308 TTE F-UP OR LMTD: CPT | Performed by: HOSPITALIST

## 2023-11-18 PROCEDURE — 99232 SBSQ HOSP IP/OBS MODERATE 35: CPT | Performed by: INTERNAL MEDICINE

## 2023-11-18 PROCEDURE — 99233 SBSQ HOSP IP/OBS HIGH 50: CPT | Performed by: HOSPITALIST

## 2023-11-18 RX ORDER — HYDROMORPHONE HYDROCHLORIDE 1 MG/ML
0.5 INJECTION, SOLUTION INTRAMUSCULAR; INTRAVENOUS; SUBCUTANEOUS ONCE
Status: COMPLETED | OUTPATIENT
Start: 2023-11-18 | End: 2023-11-18

## 2023-11-18 RX ORDER — SODIUM CHLORIDE 9 MG/ML
INJECTION, SOLUTION INTRAVENOUS CONTINUOUS
Status: DISCONTINUED | OUTPATIENT
Start: 2023-11-18 | End: 2023-11-24

## 2023-11-18 RX ORDER — ACETYLCYSTEINE 200 MG/ML
2 SOLUTION ORAL; RESPIRATORY (INHALATION) 2 TIMES DAILY
Status: DISCONTINUED | OUTPATIENT
Start: 2023-11-18 | End: 2023-11-27

## 2023-11-18 RX ORDER — POTASSIUM CHLORIDE 20 MEQ/1
40 TABLET, EXTENDED RELEASE ORAL ONCE
Status: COMPLETED | OUTPATIENT
Start: 2023-11-18 | End: 2023-11-18

## 2023-11-18 RX ORDER — MORPHINE SULFATE 2 MG/ML
2 INJECTION, SOLUTION INTRAMUSCULAR; INTRAVENOUS EVERY 4 HOURS PRN
Status: DISCONTINUED | OUTPATIENT
Start: 2023-11-18 | End: 2023-11-28

## 2023-11-18 RX ORDER — NITROGLYCERIN 0.4 MG/1
0.4 TABLET SUBLINGUAL EVERY 5 MIN PRN
Status: DISCONTINUED | OUTPATIENT
Start: 2023-11-18 | End: 2023-11-20

## 2023-11-18 RX ORDER — IPRATROPIUM BROMIDE AND ALBUTEROL SULFATE 2.5; .5 MG/3ML; MG/3ML
3 SOLUTION RESPIRATORY (INHALATION)
Status: DISCONTINUED | OUTPATIENT
Start: 2023-11-18 | End: 2023-11-20

## 2023-11-18 RX ORDER — NITROGLYCERIN 0.4 MG/1
TABLET SUBLINGUAL
Status: COMPLETED
Start: 2023-11-18 | End: 2023-11-18

## 2023-11-18 NOTE — PLAN OF CARE
Problem: Diabetes/Glucose Control  Goal: Glucose maintained within prescribed range  Description: INTERVENTIONS:  - Monitor Blood Glucose as ordered  - Assess for signs and symptoms of hyperglycemia and hypoglycemia  - Administer ordered medications to maintain glucose within target range  - Assess barriers to adequate nutritional intake and initiate nutrition consult as needed  - Instruct patient on self management of diabetes  11/18/2023 1738 by Lv Perez RN  Outcome: Progressing  11/18/2023 1737 by Lv Perez RN  Outcome: Progressing     Problem: RESPIRATORY - ADULT  Goal: Achieves optimal ventilation and oxygenation  Description: INTERVENTIONS:  - Assess for changes in respiratory status  - Assess for changes in mentation and behavior  - Position to facilitate oxygenation and minimize respiratory effort  - Oxygen supplementation based on oxygen saturation or ABGs  - Provide Smoking Cessation handout, if applicable  - Encourage broncho-pulmonary hygiene including cough, deep breathe, Incentive Spirometry  - Assess the need for suctioning and perform as needed  - Assess and instruct to report SOB or any respiratory difficulty  - Respiratory Therapy support as indicated  - Manage/alleviate anxiety  - Monitor for signs/symptoms of CO2 retention  11/18/2023 1738 by Lv Perez RN  Outcome: Progressing  11/18/2023 1737 by Lv Perez RN  Outcome: Progressing     Problem: METABOLIC/FLUID AND ELECTROLYTES - ADULT  Goal: Glucose maintained within prescribed range  Description: INTERVENTIONS:  - Monitor Blood Glucose as ordered  - Assess for signs and symptoms of hyperglycemia and hypoglycemia  - Administer ordered medications to maintain glucose within target range  - Assess barriers to adequate nutritional intake and initiate nutrition consult as needed  - Instruct patient on self management of diabetes  11/18/2023 1738 by Lv Perez RN  Outcome: Progressing  11/18/2023 1737 by Yissel Knapp Kellie Dial RN  Outcome: Progressing  Goal: Electrolytes maintained within normal limits  Description: INTERVENTIONS:  - Monitor labs and rhythm and assess patient for signs and symptoms of electrolyte imbalances  - Administer electrolyte replacement as ordered  - Monitor response to electrolyte replacements, including rhythm and repeat lab results as appropriate  - Fluid restriction as ordered  - Instruct patient on fluid and nutrition restrictions as appropriate  11/18/2023 1738 by Imelda Gao RN  Outcome: Progressing  11/18/2023 1737 by Imelda Gao RN  Outcome: Progressing     Problem: HEMATOLOGIC - ADULT  Goal: Free from bleeding injury  Description: (Example usage: patient with low platelets)  INTERVENTIONS:  - Avoid intramuscular injections, enemas and rectal medication administration  - Ensure safe mobilization of patient  - Hold pressure on venipuncture sites to achieve adequate hemostasis  - Assess for signs and symptoms of internal bleeding  - Monitor lab trends  - Patient is to report abnormal signs of bleeding to staff  - Avoid use of toothpicks and dental floss  - Use electric shaver for shaving  - Use soft bristle tooth brush  - Limit straining and forceful nose blowing  Outcome: Progressing  Pt lethargic throughout the day, only ate small amount of breakfast and declined lunch. Positional chest pain and bilateral foot pain covered with Tylenol and Lyrica. INR rising > 8 after oral vitamin K given, Pt C/O CP that is worse with movement and palpation - Unable to apply nitropaste d/t hypotension and BP is not responding to IV bolus - Michelle SHARMA @ bedside. Transfer to PCU for closer monitoring and IV pressors.

## 2023-11-18 NOTE — PROGRESS NOTES
PT orders received chart reviewed. Pt c/o not getting any sleep coughing and SOB. Current INR of 7.22, Contraindicated to see pt with INR> 5.0 for therapy. PT eval to begin as medical progress allows when pt is within a therapeutic INR range for mobility out of bed.

## 2023-11-18 NOTE — PROGRESS NOTES
Orders received and chart reviewed. Pt with INR of 7.22, Contraindicated to see pt with INR> 5.0 for therapy. Will hold.       11/18/23 1039   VISIT TYPE   OT Inpatient Visit Type (Documentation Required) Attempted Evaluation

## 2023-11-18 NOTE — PLAN OF CARE
Tylenol administered for reproducible chest pain overnight. Ambulating with SBA to bathroom. Con't IV lasix and IV Zosyn. Weaned down to 3L NC with saturations > 90%. Plan for 2D echo in AM. Angiogram on Monday. Problem: Patient Centered Care  Goal: Patient preferences are identified and integrated in the patient's plan of care  Description: Interventions:  - What would you like us to know as we care for you?  From [facility]   - Provide timely, complete, and accurate information to patient/family  - Incorporate patient and family knowledge, values, beliefs, and cultural backgrounds into the planning and delivery of care  - Encourage patient/family to participate in care and decision-making at the level they choose  - Honor patient and family perspectives and choices  Outcome: Progressing     Problem: Diabetes/Glucose Control  Goal: Glucose maintained within prescribed range  Description: INTERVENTIONS:  - Monitor Blood Glucose as ordered  - Assess for signs and symptoms of hyperglycemia and hypoglycemia  - Administer ordered medications to maintain glucose within target range  - Assess barriers to adequate nutritional intake and initiate nutrition consult as needed  - Instruct patient on self management of diabetes  Outcome: Progressing     Problem: Patient/Family Goals  Goal: Patient/Family Long Term Goal  Description: Patient's Long Term Goal: Discharge to [facility]    Interventions:  - Monitor vs, assess sob, O2 per protocol  - See additional Care Plan goals for specific interventions  Outcome: Progressing  Goal: Patient/Family Short Term Goal  Description: Patient's Short Term Goal: Manage SOB    Interventions:   - assess lung sounds, dw, I&O, FR  - See additional Care Plan goals for specific interventions  Outcome: Progressing     Problem: CARDIOVASCULAR - ADULT  Goal: Maintains optimal cardiac output and hemodynamic stability  Description: INTERVENTIONS:  - Monitor vital signs, rhythm, and trends  - Monitor for bleeding, hypotension and signs of decreased cardiac output  - Evaluate effectiveness of vasoactive medications to optimize hemodynamic stability  - Monitor arterial and/or venous puncture sites for bleeding and/or hematoma  - Assess quality of pulses, skin color and temperature  - Assess for signs of decreased coronary artery perfusion - ex.  Angina  - Evaluate fluid balance, assess for edema, trend weights  Outcome: Progressing  Goal: Absence of cardiac arrhythmias or at baseline  Description: INTERVENTIONS:  - Continuous cardiac monitoring, monitor vital signs, obtain 12 lead EKG if indicated  - Evaluate effectiveness of antiarrhythmic and heart rate control medications as ordered  - Initiate emergency measures for life threatening arrhythmias  - Monitor electrolytes and administer replacement therapy as ordered  Outcome: Progressing     Problem: RESPIRATORY - ADULT  Goal: Achieves optimal ventilation and oxygenation  Description: INTERVENTIONS:  - Assess for changes in respiratory status  - Assess for changes in mentation and behavior  - Position to facilitate oxygenation and minimize respiratory effort  - Oxygen supplementation based on oxygen saturation or ABGs  - Provide Smoking Cessation handout, if applicable  - Encourage broncho-pulmonary hygiene including cough, deep breathe, Incentive Spirometry  - Assess the need for suctioning and perform as needed  - Assess and instruct to report SOB or any respiratory difficulty  - Respiratory Therapy support as indicated  - Manage/alleviate anxiety  - Monitor for signs/symptoms of CO2 retention  Outcome: Progressing     Problem: METABOLIC/FLUID AND ELECTROLYTES - ADULT  Goal: Glucose maintained within prescribed range  Description: INTERVENTIONS:  - Monitor Blood Glucose as ordered  - Assess for signs and symptoms of hyperglycemia and hypoglycemia  - Administer ordered medications to maintain glucose within target range  - Assess barriers to adequate nutritional intake and initiate nutrition consult as needed  - Instruct patient on self management of diabetes  Outcome: Progressing  Goal: Electrolytes maintained within normal limits  Description: INTERVENTIONS:  - Monitor labs and rhythm and assess patient for signs and symptoms of electrolyte imbalances  - Administer electrolyte replacement as ordered  - Monitor response to electrolyte replacements, including rhythm and repeat lab results as appropriate  - Fluid restriction as ordered  - Instruct patient on fluid and nutrition restrictions as appropriate  Outcome: Progressing

## 2023-11-19 LAB
ANION GAP SERPL CALC-SCNC: 3 MMOL/L (ref 0–18)
ATRIAL RATE: 49 BPM
BASOPHILS # BLD AUTO: 0.02 X10(3) UL (ref 0–0.2)
BASOPHILS NFR BLD AUTO: 0.4 %
BUN BLD-MCNC: 18 MG/DL (ref 9–23)
BUN/CREAT SERPL: 8.6 (ref 10–20)
CALCIUM BLD-MCNC: 8.6 MG/DL (ref 8.7–10.4)
CHLORIDE SERPL-SCNC: 102 MMOL/L (ref 98–112)
CO2 SERPL-SCNC: 36 MMOL/L (ref 21–32)
CREAT BLD-MCNC: 2.09 MG/DL
DEPRECATED RDW RBC AUTO: 51.8 FL (ref 35.1–46.3)
DEPRECATED RDW RBC AUTO: 52.4 FL (ref 35.1–46.3)
EGFRCR SERPLBLD CKD-EPI 2021: 32 ML/MIN/1.73M2 (ref 60–?)
EOSINOPHIL # BLD AUTO: 0.15 X10(3) UL (ref 0–0.7)
EOSINOPHIL NFR BLD AUTO: 3 %
ERYTHROCYTE [DISTWIDTH] IN BLOOD BY AUTOMATED COUNT: 14.7 % (ref 11–15)
ERYTHROCYTE [DISTWIDTH] IN BLOOD BY AUTOMATED COUNT: 14.8 % (ref 11–15)
GLUCOSE BLD-MCNC: 127 MG/DL (ref 70–99)
GLUCOSE BLDC GLUCOMTR-MCNC: 123 MG/DL (ref 70–99)
GLUCOSE BLDC GLUCOMTR-MCNC: 149 MG/DL (ref 70–99)
GLUCOSE BLDC GLUCOMTR-MCNC: 156 MG/DL (ref 70–99)
GLUCOSE BLDC GLUCOMTR-MCNC: 177 MG/DL (ref 70–99)
HCT VFR BLD AUTO: 30.6 %
HCT VFR BLD AUTO: 32.8 %
HGB BLD-MCNC: 10.1 G/DL
HGB BLD-MCNC: 9.4 G/DL
IMM GRANULOCYTES # BLD AUTO: 0.01 X10(3) UL (ref 0–1)
IMM GRANULOCYTES NFR BLD: 0.2 %
INR BLD: 2.51 (ref 0.8–1.2)
LYMPHOCYTES # BLD AUTO: 0.23 X10(3) UL (ref 1–4)
LYMPHOCYTES NFR BLD AUTO: 4.6 %
MCH RBC QN AUTO: 29.3 PG (ref 26–34)
MCH RBC QN AUTO: 29.7 PG (ref 26–34)
MCHC RBC AUTO-ENTMCNC: 30.7 G/DL (ref 31–37)
MCHC RBC AUTO-ENTMCNC: 30.8 G/DL (ref 31–37)
MCV RBC AUTO: 95.1 FL
MCV RBC AUTO: 96.5 FL
MONOCYTES # BLD AUTO: 0.43 X10(3) UL (ref 0.1–1)
MONOCYTES NFR BLD AUTO: 8.6 %
NEUTROPHILS # BLD AUTO: 4.18 X10 (3) UL (ref 1.5–7.7)
NEUTROPHILS # BLD AUTO: 4.18 X10(3) UL (ref 1.5–7.7)
NEUTROPHILS NFR BLD AUTO: 83.2 %
OSMOLALITY SERPL CALC.SUM OF ELEC: 295 MOSM/KG (ref 275–295)
PLATELET # BLD AUTO: 209 10(3)UL (ref 150–450)
PLATELET # BLD AUTO: 213 10(3)UL (ref 150–450)
POTASSIUM SERPL-SCNC: 3.7 MMOL/L (ref 3.5–5.1)
PROTHROMBIN TIME: 28.6 SECONDS (ref 11.6–14.8)
Q-T INTERVAL: 434 MS
QRS DURATION: 136 MS
QTC CALCULATION (BEZET): 500 MS
R AXIS: 175 DEGREES
RBC # BLD AUTO: 3.17 X10(6)UL
RBC # BLD AUTO: 3.45 X10(6)UL
SODIUM SERPL-SCNC: 141 MMOL/L (ref 136–145)
T AXIS: 32 DEGREES
TROPONIN I SERPL HS-MCNC: 42 NG/L
VENTRICULAR RATE: 80 BPM
WBC # BLD AUTO: 4.4 X10(3) UL (ref 4–11)
WBC # BLD AUTO: 5 X10(3) UL (ref 4–11)

## 2023-11-19 PROCEDURE — 99233 SBSQ HOSP IP/OBS HIGH 50: CPT | Performed by: HOSPITALIST

## 2023-11-19 PROCEDURE — 99233 SBSQ HOSP IP/OBS HIGH 50: CPT | Performed by: INTERNAL MEDICINE

## 2023-11-19 RX ORDER — MORPHINE SULFATE 2 MG/ML
2 INJECTION, SOLUTION INTRAMUSCULAR; INTRAVENOUS ONCE
Status: COMPLETED | OUTPATIENT
Start: 2023-11-19 | End: 2023-11-19

## 2023-11-19 RX ORDER — ENEMA 19; 7 G/133ML; G/133ML
1 ENEMA RECTAL ONCE AS NEEDED
Status: DISCONTINUED | OUTPATIENT
Start: 2023-11-19 | End: 2023-11-28

## 2023-11-19 RX ORDER — BISACODYL 10 MG
10 SUPPOSITORY, RECTAL RECTAL
Status: DISCONTINUED | OUTPATIENT
Start: 2023-11-19 | End: 2023-11-28

## 2023-11-19 RX ORDER — ASPIRIN 81 MG/1
324 TABLET, CHEWABLE ORAL ONCE
Status: COMPLETED | OUTPATIENT
Start: 2023-11-20 | End: 2023-11-20

## 2023-11-19 RX ORDER — POLYETHYLENE GLYCOL 3350 17 G/17G
17 POWDER, FOR SOLUTION ORAL DAILY PRN
Status: DISCONTINUED | OUTPATIENT
Start: 2023-11-19 | End: 2023-11-28

## 2023-11-19 RX ORDER — DOCUSATE SODIUM 100 MG/1
100 CAPSULE, LIQUID FILLED ORAL 2 TIMES DAILY
Status: DISCONTINUED | OUTPATIENT
Start: 2023-11-19 | End: 2023-11-28

## 2023-11-19 RX ORDER — CHLORHEXIDINE GLUCONATE 4 G/100ML
30 SOLUTION TOPICAL
Status: COMPLETED | OUTPATIENT
Start: 2023-11-20 | End: 2023-11-20

## 2023-11-19 RX ORDER — SODIUM CHLORIDE 9 MG/ML
INJECTION, SOLUTION INTRAVENOUS
Status: COMPLETED | OUTPATIENT
Start: 2023-11-20 | End: 2023-11-22

## 2023-11-19 NOTE — PHYSICAL THERAPY NOTE
PHYSICAL THERAPY EVALUATION - INPATIENT     Room Number: 210/210-A  Evaluation Date: 11/19/2023  Type of Evaluation: Initial   Physician Order: PT Eval and Treat    Presenting Problem:  (acute on chronic CHF)  Reason for Therapy: Mobility Dysfunction and Discharge Planning    PHYSICAL THERAPY ASSESSMENT   Orders received and chart reviewed. RN approved participation in physical therapy. PPE worn by therapist: mask and gloves. Patient  wearing a mask during session. Patient is a 70year old male admitted 11/16/2023 for Presenting Problem:  (acute on chronic CHF). Patient presented in bed with 3/10 pain in chest, neg troponins. Education provided on Physical therapy plan of care and physiological benefits of out of bed mobility. Patient with fair carryover. Pt's vital respond normally to activity while on 3L O2    Bed mobility: Contact guard assist  Transfers: Contact guard assist  Gait Assistance: Contact guard assist  Distance (ft): 6  Assistive Device: Rolling walker    Patient was left in bedside chair at end of session with all needs in reach. Patient's current functional deficits include ambulation. The patient's Approx Degree of Impairment: 46.58% has been calculated based on documentation in the [facility] '6 clicks' Inpatient Basic Mobility Short Form. Research supports that patients with this level of impairment may benefit from Home with no services. RN aware of patient status post session. Patient will benefit from continued IP PT services to address these deficits in preparation for discharge. DISCHARGE RECOMMENDATIONS  PT Discharge Recommendations: Home with home health PT    PLAN  PT Treatment Plan: Bed mobility; Patient education; Family education;Gait training;Transfer training  Rehab Potential : Fair  Frequency (Obs): Daily       PHYSICAL THERAPY MEDICAL/SOCIAL HISTORY     History related to current admission:      Problem List  Principal Problem:    Acute on chronic congestive heart failure, unspecified heart failure type ([facility]  Active Problems:    Hypoxia    Acute hypoxemic respiratory failure (HCC)      Past Medical History  Past Medical History:   Diagnosis Date    Asthma     Back problem     Calculus of kidney     Cataract     Congestive heart disease (HCC)     COPD (chronic obstructive pulmonary disease) (HCC)     Deep vein thrombosis (HCC)     Diabetes ([facility]     Esophageal reflux     Heart attack ([facility]     Osteoarthritis     Stroke [facility])        Past Surgical History  Past Surgical History:   Procedure Laterality Date    CATARACT         HOME SITUATION  Type of Home: Assisted living facility                                Prior Level of [place]: ind    SUBJECTIVE  \"I'm not feeling well\"    PHYSICAL THERAPY EXAMINATION     OBJECTIVE  Precautions: Bed/chair alarm  Fall Risk: Standard fall risk    WEIGHT BEARING RESTRICTION  Weight Bearing Restriction: None                PAIN ASSESSMENT  Rating: 3  Location:  (chest)       COGNITION  Overall Cognitive Status:  WFL - within functional limits    RANGE OF MOTION AND STRENGTH ASSESSMENT  Upper extremity ROM and strength are within functional limits     Lower extremity ROM is within functional limits     Lower extremity strength is within functional limits     BALANCE  Static Sitting: Fair -  Dynamic Sitting: Fair -  Static Standing: Fair -  Dynamic Standing: Fair -    ADDITIONAL TESTS                                    NEUROLOGICAL FINDINGS                      ACTIVITY TOLERANCE  Pulse: 80        BP: 114/71             O2 WALK  Oxygen Therapy  SPO2% on Oxygen at Rest: 97  At rest oxygen flow (liters per minute): 2    AM-PAC '6-Clicks' INPATIENT SHORT FORM - BASIC MOBILITY  How much difficulty does the patient currently have. ..   Patient Difficulty: Turning over in bed (including adjusting bedclothes, sheets and blankets)?: A Little   Patient Difficulty: Sitting down on and standing up from a chair with arms (e.g., wheelchair, bedside commode, etc.): A Little   Patient Difficulty: Moving from lying on back to sitting on the side of the bed?: A Little   How much help from another person does the patient currently need. .. Help from Another: Moving to and from a bed to a chair (including a wheelchair)?: A Little   Help from Another: Need to walk in hospital room?: A Little   Help from Another: Climbing 3-5 steps with a railing?: A Little     AM-PAC Score:  Raw Score: 18   Approx Degree of Impairment: 46.58%   Standardized Score (AM-PAC Scale): 43.63   CMS Modifier (G-Code): CK      Exercise/Education Provided:  Bed mobility  Gait training  Transfer training    Patient End of Session: Up in chair    CURRENT GOALS    Goals to be met by: 12/4  Patient Goal Patient's self-stated goal is: home   Goal #1 Patient is able to demonstrate supine - sit EOB @ level: supervision     Goal #1   Current Status    Goal #2 Patient is able to demonstrate transfers Sit to/from Stand at assistance level: supervision with walker - rolling     Goal #2  Current Status    Goal #3 Patient is able to ambulate 50 feet with assist device: walker - rolling at assistance level: supervision   Goal #3   Current Status    Goal #4 Patient will negotiate 3 stairs/one curb w/ assistive device and supervision   Goal #4   Current Status    Goal #5 Patient to demonstrate independence with home activity/exercise instructions provided to patient in preparation for discharge.    Goal #5   Current Status    Goal #6    Goal #6  Current Status       Patient Evaluation Complexity Level:  History Low - no personal factors and/or co-morbidities   Examination of body systems Low - addressing 1-2 elements   Clinical Presentation Low - Stable   Clinical Decision Making Low Complexity     theractivity: 10 minutes

## 2023-11-19 NOTE — RESPIRATORY THERAPY NOTE
Attempted CPT via Vest in AM. Pt HR immediately mayra from 80 to >120bpm upon starting Vest therapy. Vest therapy was stopped & acapella was performed once HR stabilized to baseline. D/w Dr Ann Eng to be dc'd & acapella to be resumed. RT to continue to monitor.

## 2023-11-19 NOTE — PROGRESS NOTES
Notified by RN pt c/o chest pain that he rated 7/10. RN states pt reports no improvement in chest pain all day. Pt has consistently been c/o chest pain all day as noted in rounding note from cardiology. 0.5 mg Dilaudid ordered.

## 2023-11-19 NOTE — CM/SW NOTE
Social work received an update that PT/OT are recommending HH. Social work sent [place] refs in [phone] and entered Face to Face to be signed. Social work to provide [facility] and upload signed face to face when available. SW/CM to remain available for support and/or discharge planning.      Hiram Kincaid MSW, [facility]  Discharge Planner [id number]

## 2023-11-19 NOTE — PLAN OF CARE
Pt alert and oriented x4. Complained of persistent, sharp chest pain throughout the night rated 7/10. Cardiology notified. Dilaudid given, however, not responsive. Then, pt complained that pain was radiating to the left arm. Pt is asymptomatic. EKG, troponin, morphine ordered per Dr. Veena Thompson. Troponin trending down. Pt hypotensive, but asymptomatic, levo gtt started. Plan of care ongoing. Problem: Patient Centered Care  Goal: Patient preferences are identified and integrated in the patient's plan of care  Description: Interventions:  - What would you like us to know as we care for you?  From [place]   - Provide timely, complete, and accurate information to patient/family  - Incorporate patient and family knowledge, values, beliefs, and cultural backgrounds into the planning and delivery of care  - Encourage patient/family to participate in care and decision-making at the level they choose  - Honor patient and family perspectives and choices  Outcome: Progressing     Problem: Diabetes/Glucose Control  Goal: Glucose maintained within prescribed range  Description: INTERVENTIONS:  - Monitor Blood Glucose as ordered  - Assess for signs and symptoms of hyperglycemia and hypoglycemia  - Administer ordered medications to maintain glucose within target range  - Assess barriers to adequate nutritional intake and initiate nutrition consult as needed  - Instruct patient on self management of diabetes  Outcome: Progressing     Problem: Patient/Family Goals  Goal: Patient/Family Long Term Goal  Description: Patient's Long Term Goal: Discharge to [place]    Interventions:  - Monitor vs, assess sob, O2 per protocol  - See additional Care Plan goals for specific interventions  Outcome: Progressing  Goal: Patient/Family Short Term Goal  Description: Patient's Short Term Goal: Manage SOB    Interventions:   - assess lung sounds, dw, I&O, FR  - See additional Care Plan goals for specific interventions  Outcome: Progressing Problem: CARDIOVASCULAR - ADULT  Goal: Maintains optimal cardiac output and hemodynamic stability  Description: INTERVENTIONS:  - Monitor vital signs, rhythm, and trends  - Monitor for bleeding, hypotension and signs of decreased cardiac output  - Evaluate effectiveness of vasoactive medications to optimize hemodynamic stability  - Monitor arterial and/or venous puncture sites for bleeding and/or hematoma  - Assess quality of pulses, skin color and temperature  - Assess for signs of decreased coronary artery perfusion - ex.  Angina  - Evaluate fluid balance, assess for edema, trend weights  Outcome: Progressing  Goal: Absence of cardiac arrhythmias or at baseline  Description: INTERVENTIONS:  - Continuous cardiac monitoring, monitor vital signs, obtain 12 lead EKG if indicated  - Evaluate effectiveness of antiarrhythmic and heart rate control medications as ordered  - Initiate emergency measures for life threatening arrhythmias  - Monitor electrolytes and administer replacement therapy as ordered  Outcome: Progressing     Problem: RESPIRATORY - ADULT  Goal: Achieves optimal ventilation and oxygenation  Description: INTERVENTIONS:  - Assess for changes in respiratory status  - Assess for changes in mentation and behavior  - Position to facilitate oxygenation and minimize respiratory effort  - Oxygen supplementation based on oxygen saturation or ABGs  - Provide Smoking Cessation handout, if applicable  - Encourage broncho-pulmonary hygiene including cough, deep breathe, Incentive Spirometry  - Assess the need for suctioning and perform as needed  - Assess and instruct to report SOB or any respiratory difficulty  - Respiratory Therapy support as indicated  - Manage/alleviate anxiety  - Monitor for signs/symptoms of CO2 retention  Outcome: Progressing     Problem: METABOLIC/FLUID AND ELECTROLYTES - ADULT  Goal: Glucose maintained within prescribed range  Description: INTERVENTIONS:  - Monitor Blood Glucose as ordered  - Assess for signs and symptoms of hyperglycemia and hypoglycemia  - Administer ordered medications to maintain glucose within target range  - Assess barriers to adequate nutritional intake and initiate nutrition consult as needed  - Instruct patient on self management of diabetes  Outcome: Progressing  Goal: Electrolytes maintained within normal limits  Description: INTERVENTIONS:  - Monitor labs and rhythm and assess patient for signs and symptoms of electrolyte imbalances  - Administer electrolyte replacement as ordered  - Monitor response to electrolyte replacements, including rhythm and repeat lab results as appropriate  - Fluid restriction as ordered  - Instruct patient on fluid and nutrition restrictions as appropriate  Outcome: Progressing     Problem: HEMATOLOGIC - ADULT  Goal: Free from bleeding injury  Description: (Example usage: patient with low platelets)  INTERVENTIONS:  - Avoid intramuscular injections, enemas and rectal medication administration  - Ensure safe mobilization of patient  - Hold pressure on venipuncture sites to achieve adequate hemostasis  - Assess for signs and symptoms of internal bleeding  - Monitor lab trends  - Patient is to report abnormal signs of bleeding to staff  - Avoid use of toothpicks and dental floss  - Use electric shaver for shaving  - Use soft bristle tooth brush  - Limit straining and forceful nose blowing  Outcome: Progressing

## 2023-11-20 ENCOUNTER — APPOINTMENT (OUTPATIENT)
Dept: GENERAL RADIOLOGY | Facility: HOSPITAL | Age: 78
DRG: 286 | End: 2023-11-20
Attending: HOSPITALIST
Payer: MEDICARE

## 2023-11-20 ENCOUNTER — APPOINTMENT (OUTPATIENT)
Dept: INTERVENTIONAL RADIOLOGY/VASCULAR | Facility: HOSPITAL | Age: 78
DRG: 286 | End: 2023-11-20
Attending: NURSE PRACTITIONER
Payer: MEDICARE

## 2023-11-20 LAB
ANION GAP SERPL CALC-SCNC: 3 MMOL/L (ref 0–18)
BASOPHILS # BLD AUTO: 0.02 X10(3) UL (ref 0–0.2)
BASOPHILS NFR BLD AUTO: 0.6 %
BUN BLD-MCNC: 17 MG/DL (ref 9–23)
BUN/CREAT SERPL: 8.1 (ref 10–20)
CALCIUM BLD-MCNC: 8.6 MG/DL (ref 8.7–10.4)
CHLORIDE SERPL-SCNC: 102 MMOL/L (ref 98–112)
CO2 SERPL-SCNC: 35 MMOL/L (ref 21–32)
CREAT BLD-MCNC: 2.1 MG/DL
DEPRECATED RDW RBC AUTO: 50.6 FL (ref 35.1–46.3)
EGFRCR SERPLBLD CKD-EPI 2021: 32 ML/MIN/1.73M2 (ref 60–?)
EOSINOPHIL # BLD AUTO: 0.06 X10(3) UL (ref 0–0.7)
EOSINOPHIL NFR BLD AUTO: 1.8 %
ERYTHROCYTE [DISTWIDTH] IN BLOOD BY AUTOMATED COUNT: 14.4 % (ref 11–15)
GLUCOSE BLD-MCNC: 142 MG/DL (ref 70–99)
GLUCOSE BLDC GLUCOMTR-MCNC: 113 MG/DL (ref 70–99)
GLUCOSE BLDC GLUCOMTR-MCNC: 125 MG/DL (ref 70–99)
GLUCOSE BLDC GLUCOMTR-MCNC: 125 MG/DL (ref 70–99)
GLUCOSE BLDC GLUCOMTR-MCNC: 138 MG/DL (ref 70–99)
GLUCOSE BLDC GLUCOMTR-MCNC: 185 MG/DL (ref 70–99)
HCT VFR BLD AUTO: 29.6 %
HGB BLD-MCNC: 9.1 G/DL
IMM GRANULOCYTES # BLD AUTO: 0.02 X10(3) UL (ref 0–1)
IMM GRANULOCYTES NFR BLD: 0.6 %
INR BLD: 1.6 (ref 0.8–1.2)
LYMPHOCYTES # BLD AUTO: 0.44 X10(3) UL (ref 1–4)
LYMPHOCYTES NFR BLD AUTO: 12.9 %
MCH RBC QN AUTO: 29.4 PG (ref 26–34)
MCHC RBC AUTO-ENTMCNC: 30.7 G/DL (ref 31–37)
MCV RBC AUTO: 95.5 FL
MONOCYTES # BLD AUTO: 0.62 X10(3) UL (ref 0.1–1)
MONOCYTES NFR BLD AUTO: 18.1 %
NEUTROPHILS # BLD AUTO: 2.26 X10 (3) UL (ref 1.5–7.7)
NEUTROPHILS # BLD AUTO: 2.26 X10(3) UL (ref 1.5–7.7)
NEUTROPHILS NFR BLD AUTO: 66 %
OSMOLALITY SERPL CALC.SUM OF ELEC: 294 MOSM/KG (ref 275–295)
PLATELET # BLD AUTO: 137 10(3)UL (ref 150–450)
POTASSIUM SERPL-SCNC: 3.6 MMOL/L (ref 3.5–5.1)
PROTHROMBIN TIME: 20 SECONDS (ref 11.6–14.8)
RBC # BLD AUTO: 3.1 X10(6)UL
SODIUM SERPL-SCNC: 140 MMOL/L (ref 136–145)
WBC # BLD AUTO: 3.4 X10(3) UL (ref 4–11)

## 2023-11-20 PROCEDURE — B2111ZZ FLUOROSCOPY OF MULTIPLE CORONARY ARTERIES USING LOW OSMOLAR CONTRAST: ICD-10-PCS | Performed by: INTERNAL MEDICINE

## 2023-11-20 PROCEDURE — 99233 SBSQ HOSP IP/OBS HIGH 50: CPT | Performed by: INTERNAL MEDICINE

## 2023-11-20 PROCEDURE — 99233 SBSQ HOSP IP/OBS HIGH 50: CPT | Performed by: HOSPITALIST

## 2023-11-20 PROCEDURE — 71045 X-RAY EXAM CHEST 1 VIEW: CPT | Performed by: HOSPITALIST

## 2023-11-20 PROCEDURE — 4A023N8 MEASUREMENT OF CARDIAC SAMPLING AND PRESSURE, BILATERAL, PERCUTANEOUS APPROACH: ICD-10-PCS | Performed by: INTERNAL MEDICINE

## 2023-11-20 PROCEDURE — B2131ZZ FLUOROSCOPY OF MULTIPLE CORONARY ARTERY BYPASS GRAFTS USING LOW OSMOLAR CONTRAST: ICD-10-PCS | Performed by: INTERNAL MEDICINE

## 2023-11-20 RX ORDER — MIDAZOLAM HYDROCHLORIDE 1 MG/ML
INJECTION INTRAMUSCULAR; INTRAVENOUS
Status: COMPLETED
Start: 2023-11-20 | End: 2023-11-20

## 2023-11-20 RX ORDER — LIDOCAINE HYDROCHLORIDE 20 MG/ML
INJECTION, SOLUTION EPIDURAL; INFILTRATION; INTRACAUDAL; PERINEURAL
Status: COMPLETED
Start: 2023-11-20 | End: 2023-11-20

## 2023-11-20 RX ORDER — IPRATROPIUM BROMIDE AND ALBUTEROL SULFATE 2.5; .5 MG/3ML; MG/3ML
3 SOLUTION RESPIRATORY (INHALATION)
Status: DISCONTINUED | OUTPATIENT
Start: 2023-11-20 | End: 2023-11-25

## 2023-11-20 RX ORDER — HYDROMORPHONE HYDROCHLORIDE 1 MG/ML
0.4 INJECTION, SOLUTION INTRAMUSCULAR; INTRAVENOUS; SUBCUTANEOUS EVERY 2 HOUR PRN
Status: DISCONTINUED | OUTPATIENT
Start: 2023-11-20 | End: 2023-11-20

## 2023-11-20 RX ORDER — HYDROMORPHONE HYDROCHLORIDE 1 MG/ML
0.2 INJECTION, SOLUTION INTRAMUSCULAR; INTRAVENOUS; SUBCUTANEOUS EVERY 2 HOUR PRN
Status: DISCONTINUED | OUTPATIENT
Start: 2023-11-20 | End: 2023-11-20

## 2023-11-20 RX ORDER — HYDROMORPHONE HYDROCHLORIDE 1 MG/ML
0.8 INJECTION, SOLUTION INTRAMUSCULAR; INTRAVENOUS; SUBCUTANEOUS EVERY 2 HOUR PRN
Status: DISCONTINUED | OUTPATIENT
Start: 2023-11-20 | End: 2023-11-20

## 2023-11-20 NOTE — PLAN OF CARE
Levo off, Angiogram scheduled for 11/20  Problem: Patient Centered Care  Goal: Patient preferences are identified and integrated in the patient's plan of care  Description: Interventions:  - What would you like us to know as we care for you? From [place]   - Provide timely, complete, and accurate information to patient/family  - Incorporate patient and family knowledge, values, beliefs, and cultural backgrounds into the planning and delivery of care  - Encourage patient/family to participate in care and decision-making at the level they choose  - Honor patient and family perspectives and choices  Outcome: Progressing     Problem: Patient/Family Goals  Goal: Patient/Family Long Term Goal  Description: Patient's Long Term Goal: Discharge to [place]    Interventions:  - Monitor vs, assess sob, O2 per protocol  - See additional Care Plan goals for specific interventions  Outcome: Progressing  Goal: Patient/Family Short Term Goal  Description: Patient's Short Term Goal: Manage SOB    Interventions:   - assess lung sounds, dw, I&O, FR  - See additional Care Plan goals for specific interventions  Outcome: Progressing     Problem: CARDIOVASCULAR - ADULT  Goal: Maintains optimal cardiac output and hemodynamic stability  Description: INTERVENTIONS:  - Monitor vital signs, rhythm, and trends  - Monitor for bleeding, hypotension and signs of decreased cardiac output  - Evaluate effectiveness of vasoactive medications to optimize hemodynamic stability  - Monitor arterial and/or venous puncture sites for bleeding and/or hematoma  - Assess quality of pulses, skin color and temperature  - Assess for signs of decreased coronary artery perfusion - ex.  Angina  - Evaluate fluid balance, assess for edema, trend weights  Outcome: Progressing  Goal: Absence of cardiac arrhythmias or at baseline  Description: INTERVENTIONS:  - Continuous cardiac monitoring, monitor vital signs, obtain 12 lead EKG if indicated  - Evaluate effectiveness of antiarrhythmic and heart rate control medications as ordered  - Initiate emergency measures for life threatening arrhythmias  - Monitor electrolytes and administer replacement therapy as ordered  Outcome: Progressing     Problem: RESPIRATORY - ADULT  Goal: Achieves optimal ventilation and oxygenation  Description: INTERVENTIONS:  - Assess for changes in respiratory status  - Assess for changes in mentation and behavior  - Position to facilitate oxygenation and minimize respiratory effort  - Oxygen supplementation based on oxygen saturation or ABGs  - Provide Smoking Cessation handout, if applicable  - Encourage broncho-pulmonary hygiene including cough, deep breathe, Incentive Spirometry  - Assess the need for suctioning and perform as needed  - Assess and instruct to report SOB or any respiratory difficulty  - Respiratory Therapy support as indicated  - Manage/alleviate anxiety  - Monitor for signs/symptoms of CO2 retention  Outcome: Progressing     Problem: METABOLIC/FLUID AND ELECTROLYTES - ADULT  Goal: Glucose maintained within prescribed range  Description: INTERVENTIONS:  - Monitor Blood Glucose as ordered  - Assess for signs and symptoms of hyperglycemia and hypoglycemia  - Administer ordered medications to maintain glucose within target range  - Assess barriers to adequate nutritional intake and initiate nutrition consult as needed  - Instruct patient on self management of diabetes  Outcome: Progressing  Goal: Electrolytes maintained within normal limits  Description: INTERVENTIONS:  - Monitor labs and rhythm and assess patient for signs and symptoms of electrolyte imbalances  - Administer electrolyte replacement as ordered  - Monitor response to electrolyte replacements, including rhythm and repeat lab results as appropriate  - Fluid restriction as ordered  - Instruct patient on fluid and nutrition restrictions as appropriate  Outcome: Progressing     Problem: HEMATOLOGIC - ADULT  Goal: Free from bleeding injury  Description: (Example usage: patient with low platelets)  INTERVENTIONS:  - Avoid intramuscular injections, enemas and rectal medication administration  - Ensure safe mobilization of patient  - Hold pressure on venipuncture sites to achieve adequate hemostasis  - Assess for signs and symptoms of internal bleeding  - Monitor lab trends  - Patient is to report abnormal signs of bleeding to staff  - Avoid use of toothpicks and dental floss  - Use electric shaver for shaving  - Use soft bristle tooth brush  - Limit straining and forceful nose blowing  Outcome: Progressing

## 2023-11-20 NOTE — PROGRESS NOTES
11/20/23 0800   VISIT TYPE   SLP Inpatient Visit Type (Documentation Required) Attempted Evaluation   FOLLOW UP/PLAN   Follow Up Needed (Documentation Required) Yes   SLP Follow-up Date 11/21/23     SLP attempt to schedule VFSS this AM. Pt to remain NPO for procedure this PM. SLP to schedule VFSS tentatively for 11/21. RN aware of POC. Thank you. Amy M. Jinger Libman [id number] [facility]  Speech Language Pathologist  Phone Number Ext. [phone]

## 2023-11-20 NOTE — PHYSICAL THERAPY NOTE
PHYSICAL THERAPY TREATMENT NOTE - INPATIENT     Room Number: 210/210-A       Presenting Problem:  (acute on chronic CHF)    Problem List  Principal Problem:    Acute on chronic congestive heart failure, unspecified heart failure type ([facility] [address]  Active Problems:    Hypoxia    Acute hypoxemic respiratory failure ([facility] [address]      PHYSICAL THERAPY ASSESSMENT   Chart reviewed. ALEJANDRO Parrish approved participation in physical therapy. Session coordinated with OT, gait belt donned. PPE worn by therapist: mask and gloves. Patient was not wearing a mask during session. Patient presented in bed with pain. Patient with good  progress towards goals during this session. Education provided on Physical therapy plan of care and physiological benefits of out of bed mobility. Patient with good carryover. Patient on room air. Patient oxygen sat 100% and heart rate 80, blood pressure 113/77. When sitting edge of bed, patient complaining of dizziness, blood pressure 105/65. Oxygen sat 96% and heart rate 84. Patient currently with CGA for mobility with rolling walker, able to ambulate about 40ft x 1 and 60ft x 1 with rolling walker. Session focused on bed mobility, transfers, and gait training. Patient with good  progress towards goals and good carryover during session. Patient continues to function below baseline with bed mobility, transfers, and gait. Patient remains most limited by decreased functional strength, pain, and medical status. Will continue to follow patient for duration of hospital stay per plan of care, next session anticipate to progress bed mobility, transfers, and gait. Discharge recommendation remains appropriate based on the patient's performance, personal factors, and remaining functional impairments. Goals remain in progress. Updated nurse on results of session, patient left in bed, all lines intact, all needs met with call light in reach. Patient on 2.5 liters of oxygen during the session.      Bed mobility: Supervision  Transfers: Contact guard assist  Gait Assistance: Contact guard assist  Distance (ft): 40ft x 1 60ft x 1  Assistive Device: Rolling walker             Patient was left in bed at end of session with all needs in reach. Patient will likely have the physical skills to return to prior living environment upon D/C from the hospital. Anticipate patient will benefit from continued skilled physical therapy while in the hospital and upon D/C from the hospital with home health and 24 hour supervision. The patient's Approx Degree of Impairment: 46.58% has been calculated based on documentation in the [facility] '6 clicks' Inpatient Basic Mobility Short Form. Research supports that patients with this level of impairment may benefit from Home with home health PT. RN aware of patient status post session. DISCHARGE RECOMMENDATIONS  PT Discharge Recommendations: 24 hour care/supervision;Home with home health PT     PLAN  PT Treatment Plan: Bed mobility; Body mechanics; Patient education;Gait training;Transfer training;Balance training    SUBJECTIVE  \"I am going for a test today\"    OBJECTIVE  Precautions: Bed/chair alarm    WEIGHT BEARING RESTRICTION  Weight Bearing Restriction: None                PAIN ASSESSMENT   Rating: Unable to rate  Location: chest  Management Techniques: Activity promotion; Body mechanics;Repositioning    BALANCE                                                                                                                       Static Sitting: Good  Dynamic Sitting: Fair +           Static Standing: Fair  Dynamic [facility] '6-Clicks' INPATIENT SHORT FORM - BASIC MOBILITY  How much difficulty does the patient currently have. ..   Patient Difficulty: Turning over in bed (including adjusting bedclothes, sheets and blankets)?: A Little   Patient Difficulty: Sitting down on and standing up from a chair with arms (e.g., wheelchair, bedside commode, etc.): A Little   Patient Difficulty: Moving from lying on back to sitting on the side of the bed?: A Little   How much help from another person does the patient currently need. .. Help from Another: Moving to and from a bed to a chair (including a wheelchair)?: A Little   Help from Another: Need to walk in hospital room?: A Little   Help from Another: Climbing 3-5 steps with a railing?: A Little     AM-PAC Score:  Raw Score: 18   Approx Degree of Impairment: 46.58%   Standardized Score (AM-PAC Scale): 43.63   CMS Modifier (G-Code): CK    Patient End of Session: In bed;Needs met;Call light within reach;RN aware of session/findings; All patient questions and concerns addressed    CURRENT GOALS   Goals to be met by: 12/4  Patient Goal Patient's self-stated goal is: home   Goal #1 Patient is able to demonstrate supine - sit EOB @ level: supervision      Goal #1   Current Status Goal met    Goal #2 Patient is able to demonstrate transfers Sit to/from Stand at assistance level: supervision with walker - rolling      Goal #2  Current Status CGA with rolling walker    Goal #3 Patient is able to ambulate 50 feet with assist device: walker - rolling at assistance level: supervision   Goal #3   Current Status 40ft x 1 60ft x 1 with rolling walker CGA   Goal #4 Patient will negotiate 3 stairs/one curb w/ assistive device and supervision   Goal #4   Current Status Not tested    Goal #5 Patient to demonstrate independence with home activity/exercise instructions provided to patient in preparation for discharge.    Goal #5   Current Status In progress   Goal #6     Goal #6  Current Status        Gait Training: 10 minutes  Therapeutic Activity: 13 minutes

## 2023-11-20 NOTE — PLAN OF CARE
NPO post midnight. For Angiogram today. Consent signed,  Problem: Patient Centered Care  Goal: Patient preferences are identified and integrated in the patient's plan of care  Description: Interventions:  - What would you like us to know as we care for you?  From [place]   - Provide timely, complete, and accurate information to patient/family  - Incorporate patient and family knowledge, values, beliefs, and cultural backgrounds into the planning and delivery of care  - Encourage patient/family to participate in care and decision-making at the level they choose  - Honor patient and family perspectives and choices  Outcome: Progressing     Problem: Diabetes/Glucose Control  Goal: Glucose maintained within prescribed range  Description: INTERVENTIONS:  - Monitor Blood Glucose as ordered  - Assess for signs and symptoms of hyperglycemia and hypoglycemia  - Administer ordered medications to maintain glucose within target range  - Assess barriers to adequate nutritional intake and initiate nutrition consult as needed  - Instruct patient on self management of diabetes  Outcome: Progressing     Problem: Patient/Family Goals  Goal: Patient/Family Long Term Goal  Description: Patient's Long Term Goal: Discharge to [place]    Interventions:  - Monitor vs, assess sob, O2 per protocol  - See additional Care Plan goals for specific interventions  Outcome: Progressing  Goal: Patient/Family Short Term Goal  Description: Patient's Short Term Goal: Manage SOB    Interventions:   - assess lung sounds, dw, I&O, FR  - See additional Care Plan goals for specific interventions  Outcome: Progressing     Problem: CARDIOVASCULAR - ADULT  Goal: Maintains optimal cardiac output and hemodynamic stability  Description: INTERVENTIONS:  - Monitor vital signs, rhythm, and trends  - Monitor for bleeding, hypotension and signs of decreased cardiac output  - Evaluate effectiveness of vasoactive medications to optimize hemodynamic stability  - Monitor arterial and/or venous puncture sites for bleeding and/or hematoma  - Assess quality of pulses, skin color and temperature  - Assess for signs of decreased coronary artery perfusion - ex.  Angina  - Evaluate fluid balance, assess for edema, trend weights  Outcome: Progressing  Goal: Absence of cardiac arrhythmias or at baseline  Description: INTERVENTIONS:  - Continuous cardiac monitoring, monitor vital signs, obtain 12 lead EKG if indicated  - Evaluate effectiveness of antiarrhythmic and heart rate control medications as ordered  - Initiate emergency measures for life threatening arrhythmias  - Monitor electrolytes and administer replacement therapy as ordered  Outcome: Progressing     Problem: RESPIRATORY - ADULT  Goal: Achieves optimal ventilation and oxygenation  Description: INTERVENTIONS:  - Assess for changes in respiratory status  - Assess for changes in mentation and behavior  - Position to facilitate oxygenation and minimize respiratory effort  - Oxygen supplementation based on oxygen saturation or ABGs  - Provide Smoking Cessation handout, if applicable  - Encourage broncho-pulmonary hygiene including cough, deep breathe, Incentive Spirometry  - Assess the need for suctioning and perform as needed  - Assess and instruct to report SOB or any respiratory difficulty  - Respiratory Therapy support as indicated  - Manage/alleviate anxiety  - Monitor for signs/symptoms of CO2 retention  Outcome: Progressing     Problem: METABOLIC/FLUID AND ELECTROLYTES - ADULT  Goal: Glucose maintained within prescribed range  Description: INTERVENTIONS:  - Monitor Blood Glucose as ordered  - Assess for signs and symptoms of hyperglycemia and hypoglycemia  - Administer ordered medications to maintain glucose within target range  - Assess barriers to adequate nutritional intake and initiate nutrition consult as needed  - Instruct patient on self management of diabetes  Outcome: Progressing  Goal: Electrolytes maintained within normal limits  Description: INTERVENTIONS:  - Monitor labs and rhythm and assess patient for signs and symptoms of electrolyte imbalances  - Administer electrolyte replacement as ordered  - Monitor response to electrolyte replacements, including rhythm and repeat lab results as appropriate  - Fluid restriction as ordered  - Instruct patient on fluid and nutrition restrictions as appropriate  Outcome: Progressing     Problem: HEMATOLOGIC - ADULT  Goal: Free from bleeding injury  Description: (Example usage: patient with low platelets)  INTERVENTIONS:  - Avoid intramuscular injections, enemas and rectal medication administration  - Ensure safe mobilization of patient  - Hold pressure on venipuncture sites to achieve adequate hemostasis  - Assess for signs and symptoms of internal bleeding  - Monitor lab trends  - Patient is to report abnormal signs of bleeding to staff  - Avoid use of toothpicks and dental floss  - Use electric shaver for shaving  - Use soft bristle tooth brush  - Limit straining and forceful nose blowing  Outcome: Progressing

## 2023-11-20 NOTE — PROCEDURES
Cardiologist: Dinorah Moran MD  Primary Proceduralist: Dinorah Moran MD  Procedure Performed: LHC, RHC, and COR  Date of Procedure: 11/20/2023     Pre procedure diagnosis: LV dysfunction, new onset  Post procedure diagnosis: As above    Summary of findings: Patent LAD stent, no other significant disease. Moderate pulmonary hypertension with normal LVEDP. Normal cardiac index. Post procedure findings:    Hemodynamic Data    RA 7 mm of Hg  RV 57/5 mm of Hg  PA 53/20 mean 34 mm of Hg  PCW 18 mm of Hg    CO 6.1 L/min by Jerry/ CI 3 (L/min)/m2   dynes/  dynes    Coronary anatomy    1) Left Ventriculography at 30 degrees BENITEZ: Not performed, renal insufficiency  2) Hemodynamics: LVEDP: 8 mmHg, no gradient across aortic valve  3) Coronaries:  Left main is patent and free of obstructive disease  LAD is patent and patent stent in the proximal LAD, supplies 2 diagonals which are non-obstructive  Cx is patent and free of obstructive disease, supplies 1 OM branches which are patent  RCA is dominant and free of obstructive disease, supplies PDA and PL      Recommendations:  Medical therapy. Hold diuretic. Patient was given normal saline 200 cc in the Cath Lab. Summary of Case: After written informed consent was obtained from the patient, patient was brought to the cardiac catheterization laboratory. Patient was prepped and draped in the usual sterile fashion. Lidocaine 1% was used to infiltrate the right groin for local anesthesia and a 6 [place] introducer sheath was inserted into the right femoral artery. Right femoral vein accessed. 7 FR sheath placed. [place] advanced to RA and pressures recorded in RA, RV, PA and wedge positions under fluoroscopic and hemodynamic guidance. Selective coronary angiography performed with JR4 catheter for RCA and JL4 catheter for LCA. Angiography performed in standard projections.       Selective right femoral angiogram done assess anatomy for closure. Specimen sent to: No specimen collected  Estimated blood loss: 10 cc  Closure: Mynx for artery and manual for vein      IV was maintained by RN and moderate conscious sedation of versed and fentanyl was given. Patient was assessed and monitoring of oxygen, heart rate and blood pressure by nurse and myself during the exam 20 minutes.       Cynthia Veloz MD  11/20/23

## 2023-11-20 NOTE — PLAN OF CARE
Problem: RESPIRATORY - ADULT  Goal: Achieves optimal ventilation and oxygenation  Description: INTERVENTIONS:  - Assess for changes in respiratory status  - Assess for changes in mentation and behavior  - Position to facilitate oxygenation and minimize respiratory effort  - Oxygen supplementation based on oxygen saturation or ABGs  - Provide Smoking Cessation handout, if applicable  - Encourage broncho-pulmonary hygiene including cough, deep breathe, Incentive Spirometry  - Assess the need for suctioning and perform as needed  - Assess and instruct to report SOB or any respiratory difficulty  - Respiratory Therapy support as indicated  - Manage/alleviate anxiety  - Monitor for signs/symptoms of CO2 retention  Outcome: Progressing

## 2023-11-21 ENCOUNTER — APPOINTMENT (OUTPATIENT)
Dept: GENERAL RADIOLOGY | Facility: HOSPITAL | Age: 78
DRG: 286 | End: 2023-11-21
Attending: INTERNAL MEDICINE
Payer: MEDICARE

## 2023-11-21 LAB
ANION GAP SERPL CALC-SCNC: 1 MMOL/L (ref 0–18)
ATRIAL RATE: 70 BPM
BASOPHILS # BLD AUTO: 0.01 X10(3) UL (ref 0–0.2)
BASOPHILS NFR BLD AUTO: 0.3 %
BUN BLD-MCNC: 15 MG/DL (ref 9–23)
BUN/CREAT SERPL: 7.8 (ref 10–20)
CALCIUM BLD-MCNC: 8.6 MG/DL (ref 8.7–10.4)
CHLORIDE SERPL-SCNC: 104 MMOL/L (ref 98–112)
CO2 SERPL-SCNC: 36 MMOL/L (ref 21–32)
CREAT BLD-MCNC: 1.93 MG/DL
DEPRECATED RDW RBC AUTO: 51 FL (ref 35.1–46.3)
EGFRCR SERPLBLD CKD-EPI 2021: 35 ML/MIN/1.73M2 (ref 60–?)
EOSINOPHIL # BLD AUTO: 0.03 X10(3) UL (ref 0–0.7)
EOSINOPHIL NFR BLD AUTO: 0.9 %
ERYTHROCYTE [DISTWIDTH] IN BLOOD BY AUTOMATED COUNT: 14.5 % (ref 11–15)
GLUCOSE BLD-MCNC: 138 MG/DL (ref 70–99)
GLUCOSE BLDC GLUCOMTR-MCNC: 125 MG/DL (ref 70–99)
GLUCOSE BLDC GLUCOMTR-MCNC: 179 MG/DL (ref 70–99)
GLUCOSE BLDC GLUCOMTR-MCNC: 223 MG/DL (ref 70–99)
HCT VFR BLD AUTO: 29 %
HGB BLD-MCNC: 8.8 G/DL
IMM GRANULOCYTES # BLD AUTO: 0.03 X10(3) UL (ref 0–1)
IMM GRANULOCYTES NFR BLD: 0.9 %
INR BLD: 2.1 (ref 0.8–1.2)
LYMPHOCYTES # BLD AUTO: 0.36 X10(3) UL (ref 1–4)
LYMPHOCYTES NFR BLD AUTO: 10.3 %
MCH RBC QN AUTO: 29.1 PG (ref 26–34)
MCHC RBC AUTO-ENTMCNC: 30.3 G/DL (ref 31–37)
MCV RBC AUTO: 96 FL
MONOCYTES # BLD AUTO: 0.5 X10(3) UL (ref 0.1–1)
MONOCYTES NFR BLD AUTO: 14.2 %
NEUTROPHILS # BLD AUTO: 2.58 X10 (3) UL (ref 1.5–7.7)
NEUTROPHILS # BLD AUTO: 2.58 X10(3) UL (ref 1.5–7.7)
NEUTROPHILS NFR BLD AUTO: 73.4 %
OSMOLALITY SERPL CALC.SUM OF ELEC: 295 MOSM/KG (ref 275–295)
PLATELET # BLD AUTO: 139 10(3)UL (ref 150–450)
POTASSIUM SERPL-SCNC: 4 MMOL/L (ref 3.5–5.1)
PROTHROMBIN TIME: 24.9 SECONDS (ref 11.6–14.8)
Q-T INTERVAL: 380 MS
QRS DURATION: 140 MS
QTC CALCULATION (BEZET): 438 MS
R AXIS: 73 DEGREES
RBC # BLD AUTO: 3.02 X10(6)UL
SODIUM SERPL-SCNC: 141 MMOL/L (ref 136–145)
T AXIS: 40 DEGREES
VENTRICULAR RATE: 80 BPM
WBC # BLD AUTO: 3.5 X10(3) UL (ref 4–11)

## 2023-11-21 PROCEDURE — 99233 SBSQ HOSP IP/OBS HIGH 50: CPT | Performed by: INTERNAL MEDICINE

## 2023-11-21 PROCEDURE — 71045 X-RAY EXAM CHEST 1 VIEW: CPT | Performed by: INTERNAL MEDICINE

## 2023-11-21 PROCEDURE — 99233 SBSQ HOSP IP/OBS HIGH 50: CPT | Performed by: HOSPITALIST

## 2023-11-21 RX ORDER — WARFARIN SODIUM 2 MG/1
2 TABLET ORAL
Status: ACTIVE | OUTPATIENT
Start: 2023-11-21 | End: 2023-11-22

## 2023-11-21 RX ORDER — ASPIRIN 81 MG/1
81 TABLET, CHEWABLE ORAL DAILY
Status: DISCONTINUED | OUTPATIENT
Start: 2023-11-21 | End: 2023-11-28

## 2023-11-21 NOTE — PLAN OF CARE
Pt A/O*4, saturating >95% on 2 L oxygen, weaned down from 3 L. Respiratory therapy for improved breathing, bronchoscopy scheduled  tomorrow. Pt updated on plan of care. All safety precautions in place. Problem: Patient Centered Care  Goal: Patient preferences are identified and integrated in the patient's plan of care  Description: Interventions:  - What would you like us to know as we care for you? From [facility]   - Provide timely, complete, and accurate information to patient/family  - Incorporate patient and family knowledge, values, beliefs, and cultural backgrounds into the planning and delivery of care  - Encourage patient/family to participate in care and decision-making at the level they choose  - Honor patient and family perspectives and choices  Outcome: Progressing     Problem: Diabetes/Glucose Control  Goal: Glucose maintained within prescribed range  Description: INTERVENTIONS:  - Monitor Blood Glucose as ordered  - Assess for signs and symptoms of hyperglycemia and hypoglycemia  - Administer ordered medications to maintain glucose within target range  - Assess barriers to adequate nutritional intake and initiate nutrition consult as needed  - Instruct patient on self management of diabetes  Outcome: Progressing     Problem: CARDIOVASCULAR - ADULT  Goal: Maintains optimal cardiac output and hemodynamic stability  Description: INTERVENTIONS:  - Monitor vital signs, rhythm, and trends  - Monitor for bleeding, hypotension and signs of decreased cardiac output  - Evaluate effectiveness of vasoactive medications to optimize hemodynamic stability  - Monitor arterial and/or venous puncture sites for bleeding and/or hematoma  - Assess quality of pulses, skin color and temperature  - Assess for signs of decreased coronary artery perfusion - ex.  Angina  - Evaluate fluid balance, assess for edema, trend weights  Outcome: Progressing  Goal: Absence of cardiac arrhythmias or at baseline  Description: INTERVENTIONS:  - Continuous cardiac monitoring, monitor vital signs, obtain 12 lead EKG if indicated  - Evaluate effectiveness of antiarrhythmic and heart rate control medications as ordered  - Initiate emergency measures for life threatening arrhythmias  - Monitor electrolytes and administer replacement therapy as ordered  Outcome: Progressing     Problem: RESPIRATORY - ADULT  Goal: Achieves optimal ventilation and oxygenation  Description: INTERVENTIONS:  - Assess for changes in respiratory status  - Assess for changes in mentation and behavior  - Position to facilitate oxygenation and minimize respiratory effort  - Oxygen supplementation based on oxygen saturation or ABGs  - Provide Smoking Cessation handout, if applicable  - Encourage broncho-pulmonary hygiene including cough, deep breathe, Incentive Spirometry  - Assess the need for suctioning and perform as needed  - Assess and instruct to report SOB or any respiratory difficulty  - Respiratory Therapy support as indicated  - Manage/alleviate anxiety  - Monitor for signs/symptoms of CO2 retention  Outcome: Progressing     Problem: METABOLIC/FLUID AND ELECTROLYTES - ADULT  Goal: Glucose maintained within prescribed range  Description: INTERVENTIONS:  - Monitor Blood Glucose as ordered  - Assess for signs and symptoms of hyperglycemia and hypoglycemia  - Administer ordered medications to maintain glucose within target range  - Assess barriers to adequate nutritional intake and initiate nutrition consult as needed  - Instruct patient on self management of diabetes  Outcome: Progressing  Goal: Electrolytes maintained within normal limits  Description: INTERVENTIONS:  - Monitor labs and rhythm and assess patient for signs and symptoms of electrolyte imbalances  - Administer electrolyte replacement as ordered  - Monitor response to electrolyte replacements, including rhythm and repeat lab results as appropriate  - Fluid restriction as ordered  - Instruct patient on fluid and nutrition restrictions as appropriate  Outcome: Progressing     Problem: HEMATOLOGIC - ADULT  Goal: Free from bleeding injury  Description: (Example usage: patient with low platelets)  INTERVENTIONS:  - Avoid intramuscular injections, enemas and rectal medication administration  - Ensure safe mobilization of patient  - Hold pressure on venipuncture sites to achieve adequate hemostasis  - Assess for signs and symptoms of internal bleeding  - Monitor lab trends  - Patient is to report abnormal signs of bleeding to staff  - Avoid use of toothpicks and dental floss  - Use electric shaver for shaving  - Use soft bristle tooth brush  - Limit straining and forceful nose blowing  Outcome: Progressing

## 2023-11-21 NOTE — PLAN OF CARE
Patient A&Ox4, increased back up to 3L/NC for feelings of shortness of breath. IV morphine and flexeril given for pain on left side of chest. [facility] aware, no new orders at this time. IV zosyn and IV continued. Using urinal at the bedside. Safety precautions maintained, call light and personal belongings within reach. Plan is for home w/ home health PT & 24hr caregiver when medically cleared. Problem: Patient Centered Care  Goal: Patient preferences are identified and integrated in the patient's plan of care  Description: Interventions:  - What would you like us to know as we care for you?  From [facility]  - Provide timely, complete, and accurate information to patient/family  - Incorporate patient and family knowledge, values, beliefs, and cultural backgrounds into the planning and delivery of care  - Encourage patient/family to participate in care and decision-making at the level they choose  - Honor patient and family perspectives and choices  Outcome: Progressing     Problem: Diabetes/Glucose Control  Goal: Glucose maintained within prescribed range  Description: INTERVENTIONS:  - Monitor Blood Glucose as ordered  - Assess for signs and symptoms of hyperglycemia and hypoglycemia  - Administer ordered medications to maintain glucose within target range  - Assess barriers to adequate nutritional intake and initiate nutrition consult as needed  - Instruct patient on self management of diabetes  Outcome: Progressing     Problem: Patient/Family Goals  Goal: Patient/Family Long Term Goal  Description: Patient's Long Term Goal: Discharge to [facility]    Interventions:  - Monitor vs, assess sob, O2 per protocol  - See additional Care Plan goals for specific interventions  Outcome: Progressing  Goal: Patient/Family Short Term Goal  Description: Patient's Short Term Goal: Manage SOB    Interventions:   - assess lung sounds, dw, I&O, FR  - See additional Care Plan goals for specific interventions  Outcome: Progressing Problem: CARDIOVASCULAR - ADULT  Goal: Maintains optimal cardiac output and hemodynamic stability  Description: INTERVENTIONS:  - Monitor vital signs, rhythm, and trends  - Monitor for bleeding, hypotension and signs of decreased cardiac output  - Evaluate effectiveness of vasoactive medications to optimize hemodynamic stability  - Monitor arterial and/or venous puncture sites for bleeding and/or hematoma  - Assess quality of pulses, skin color and temperature  - Assess for signs of decreased coronary artery perfusion - ex.  Angina  - Evaluate fluid balance, assess for edema, trend weights  Outcome: Progressing  Goal: Absence of cardiac arrhythmias or at baseline  Description: INTERVENTIONS:  - Continuous cardiac monitoring, monitor vital signs, obtain 12 lead EKG if indicated  - Evaluate effectiveness of antiarrhythmic and heart rate control medications as ordered  - Initiate emergency measures for life threatening arrhythmias  - Monitor electrolytes and administer replacement therapy as ordered  Outcome: Progressing     Problem: RESPIRATORY - ADULT  Goal: Achieves optimal ventilation and oxygenation  Description: INTERVENTIONS:  - Assess for changes in respiratory status  - Assess for changes in mentation and behavior  - Position to facilitate oxygenation and minimize respiratory effort  - Oxygen supplementation based on oxygen saturation or ABGs  - Provide Smoking Cessation handout, if applicable  - Encourage broncho-pulmonary hygiene including cough, deep breathe, Incentive Spirometry  - Assess the need for suctioning and perform as needed  - Assess and instruct to report SOB or any respiratory difficulty  - Respiratory Therapy support as indicated  - Manage/alleviate anxiety  - Monitor for signs/symptoms of CO2 retention  Outcome: Progressing     Problem: METABOLIC/FLUID AND ELECTROLYTES - ADULT  Goal: Glucose maintained within prescribed range  Description: INTERVENTIONS:  - Monitor Blood Glucose as ordered  - Assess for signs and symptoms of hyperglycemia and hypoglycemia  - Administer ordered medications to maintain glucose within target range  - Assess barriers to adequate nutritional intake and initiate nutrition consult as needed  - Instruct patient on self management of diabetes  Outcome: Progressing  Goal: Electrolytes maintained within normal limits  Description: INTERVENTIONS:  - Monitor labs and rhythm and assess patient for signs and symptoms of electrolyte imbalances  - Administer electrolyte replacement as ordered  - Monitor response to electrolyte replacements, including rhythm and repeat lab results as appropriate  - Fluid restriction as ordered  - Instruct patient on fluid and nutrition restrictions as appropriate  Outcome: Progressing     Problem: HEMATOLOGIC - ADULT  Goal: Free from bleeding injury  Description: (Example usage: patient with low platelets)  INTERVENTIONS:  - Avoid intramuscular injections, enemas and rectal medication administration  - Ensure safe mobilization of patient  - Hold pressure on venipuncture sites to achieve adequate hemostasis  - Assess for signs and symptoms of internal bleeding  - Monitor lab trends  - Patient is to report abnormal signs of bleeding to staff  - Avoid use of toothpicks and dental floss  - Use electric shaver for shaving  - Use soft bristle tooth brush  - Limit straining and forceful nose blowing  Outcome: Progressing

## 2023-11-21 NOTE — SLP NOTE
SPEECH DAILY NOTE - INPATIENT    ASSESSMENT & PLAN   ASSESSMENT  PPE REQUIRED. THIS THERAPIST WORE GLOVES AND MASK FOR DURATION OF EVALUATION. HANDS WASHED UPON ENTRANCE/EXIT. SLP f/u for ongoing dysphagia tx/meal assessment per recommendations of regular/thin liquids per BSE. RN reports pt tolerates diet and medication well with no overt clinical s/s aspiration. Pt denies any swallowing challenges. Pt positioned upright in bed, alert/cooperative. Pt afebrile, tolerating 3L/HF O2NC with oxygen status 100% prior to the start of oral trials. SLP reviewed aspiration precautions and safe swallowing compensatory strategies with the patient. Safe swallow guidelines remain written on the white board in purple. Patient v/u, no family present. Provided mod assistance, pt tolerates hard solids and thin liquids via straw sips with no overt clinical signs/symptoms of aspiration. Oxygen status remained stable t/o the entire session. Recommend remain on current diet. SLP to f/u with meal assessment x1-2, monitor  CXR, and VFSS if any overt CSA and/or decline in CXR. RN alerted with results and recommendations. MOST RECENT CXR 11/21  CONCLUSION: Persistent left pleural effusion and left basilar atelectasis. Small right pleural effusion may be new from the prior exam.  Persistent pulmonary edema. Diet Recommendations - Solids: Regular  Diet Recommendations - Liquids: Thin Liquids    Compensatory Strategies Recommended: No straws; Slow rate;Small bites and sips; Chin tuck  Aspiration Precautions: Upright position; Slow rate;Small bites and sips; No straw  Medication Administration Recommendations:  (as tolerated)    Patient Experiencing Pain: No                Discharge Recommendations  Discharge Recommendations/Plan: Undetermined    Treatment Plan  Treatment Plan/Recommendations: Aspiration precautions    Interdisciplinary Communication: Discussed with RN            GOALS  Goal #1 The patient will tolerate regular consistency and thin liquids without overt signs or symptoms of aspiration with 100 % accuracy over 1-2 session(s). In Progress   Goal #2 The patient/family/caregiver will demonstrate understanding and implementation of aspiration precautions and swallow strategies independently over 1-2 session(s). In Progress   Goal #3 The patient will utilize compensatory strategies as outlined by  BSSE (clinical evaluation) including Slow rate, Small bites, Small sips, No straws, Swallow/throat clear/swallow again, Upright 90 degrees, Upright 90 degrees 30 mins after meal, Eliminate distractions with min assistance 100 % of the time across 2 sessions.   In Progress     FOLLOW UP  Follow Up Needed (Documentation Required): Yes  SLP Follow-up Date: 11/22/23  Number of Visits to Meet Established Goals:  (1-2)    Session: 1    If you have any questions, please contact JANI Porter. Florecita Mcgregor Pathologist  Phone Number [phone]

## 2023-11-21 NOTE — CM/SW NOTE
CM confirmed with Joi Prasad about his preference for [facility]. Joi Prasad is refusing [facility] at this time. He lives at [place] [address]. Will need assessment of O2 needs at dc- current home baseline for O2 is 3L,  Patient requesting transport at dc. / to remain available for support and/or discharge planning.       Sonja Ovalle MBA BSN RN CRRN   RN Case Manager  [phone]

## 2023-11-21 NOTE — RESPIRATORY THERAPY NOTE
Patient received on 3 L nasal cannula. Weaned to 2 L today. Nebulizer's administered as ordered. CPT performed with nebulizer treatments using pneumatic percussor on left upper and lower lobe. Patient turned on to right side during therapy. Tolerated well. Breath sounds more diminished on left than right. Performed IS and flutter with patient as well. Achieving 1000cc on IS for first couple breaths then slowly decreases each breath. Nonproductive cough. Patient may benefit from metaneb if no improvement noticed on CXR from current therapy. RT will continue to monitor/assess.     Problem: RESPIRATORY - ADULT  Goal: Achieves optimal ventilation and oxygenation  Description: INTERVENTIONS:  - Assess for changes in respiratory status  - Assess for changes in mentation and behavior  - Position to facilitate oxygenation and minimize respiratory effort  - Oxygen supplementation based on oxygen saturation or ABGs  - Provide Smoking Cessation handout, if applicable  - Encourage broncho-pulmonary hygiene including cough, deep breathe, Incentive Spirometry  - Assess the need for suctioning and perform as needed  - Assess and instruct to report SOB or any respiratory difficulty  - Respiratory Therapy support as indicated  - Manage/alleviate anxiety  - Monitor for signs/symptoms of CO2 retention  Outcome: Progressing

## 2023-11-21 NOTE — PLAN OF CARE
[address] Patient Status:  Inpatient    1945 MRN [id number]   Location [facility] 2W/SW Attending Diego Craven MD   Hosp Day # 4 PCP None Pcp     Cardiology Nocturnal APN Plan of Care     Page Received: Bedside RN [id number]    Patient with CP, s/p clean cath without intervention. Being treated for PNA / COPD. Now with CP. RN completed EKG and gave MS prior to notification. Assessment/Plan:   - EKG paced rhythm.   No need to repeat EKG if patient has CP as he is in a paced rhytm  - Clean cath, unlikely patient has cardiac origin of pain, SL ntg d/c'd with borderline BP  - Patient being treated for PNA, may be a pleuritic chest pain         [facility], [address]  2023  10:41 PM

## 2023-11-22 ENCOUNTER — ANESTHESIA EVENT (OUTPATIENT)
Dept: ENDOSCOPY | Facility: HOSPITAL | Age: 78
End: 2023-11-22
Payer: MEDICARE

## 2023-11-22 ENCOUNTER — ANESTHESIA (OUTPATIENT)
Dept: ENDOSCOPY | Facility: HOSPITAL | Age: 78
End: 2023-11-22
Payer: MEDICARE

## 2023-11-22 ENCOUNTER — APPOINTMENT (OUTPATIENT)
Dept: GENERAL RADIOLOGY | Facility: HOSPITAL | Age: 78
DRG: 286 | End: 2023-11-22
Attending: INTERNAL MEDICINE
Payer: MEDICARE

## 2023-11-22 LAB
ANION GAP SERPL CALC-SCNC: 2 MMOL/L (ref 0–18)
BASOPHILS NFR BRONCH: 0 %
BUN BLD-MCNC: 13 MG/DL (ref 9–23)
BUN/CREAT SERPL: 7.1 (ref 10–20)
CALCIUM BLD-MCNC: 8.5 MG/DL (ref 8.7–10.4)
CHLORIDE SERPL-SCNC: 104 MMOL/L (ref 98–112)
CO2 SERPL-SCNC: 34 MMOL/L (ref 21–32)
COLOR FLD: COLORLESS
CREAT BLD-MCNC: 1.82 MG/DL
DEPRECATED RDW RBC AUTO: 50.5 FL (ref 35.1–46.3)
EGFRCR SERPLBLD CKD-EPI 2021: 38 ML/MIN/1.73M2 (ref 60–?)
EOSINOPHIL NFR BRONCH: 0 %
ERYTHROCYTE [DISTWIDTH] IN BLOOD BY AUTOMATED COUNT: 14.4 % (ref 11–15)
GLUCOSE BLD-MCNC: 134 MG/DL (ref 70–99)
GLUCOSE BLDC GLUCOMTR-MCNC: 142 MG/DL (ref 70–99)
GLUCOSE BLDC GLUCOMTR-MCNC: 250 MG/DL (ref 70–99)
GLUCOSE BLDC GLUCOMTR-MCNC: 99 MG/DL (ref 70–99)
HCT VFR BLD AUTO: 29 %
HGB BLD-MCNC: 8.9 G/DL
INR BLD: 2.68 (ref 0.8–1.2)
LYMPHOCYTES NFR BRONCH: 19 %
M TB CMPLX RRNA SPEC QL PROBE: NOT DETECTED
MCH RBC QN AUTO: 29.3 PG (ref 26–34)
MCHC RBC AUTO-ENTMCNC: 30.7 G/DL (ref 31–37)
MCV RBC AUTO: 95.4 FL
MONOS+MACROS NFR BRONCH: 3 %
NEUTROPHILS NFR BRONCH: 78 %
OSMOLALITY SERPL CALC.SUM OF ELEC: 292 MOSM/KG (ref 275–295)
PLATELET # BLD AUTO: 120 10(3)UL (ref 150–450)
POTASSIUM SERPL-SCNC: 3.6 MMOL/L (ref 3.5–5.1)
PROTHROMBIN TIME: 30.2 SECONDS (ref 11.6–14.8)
RBC # BLD AUTO: 3.04 X10(6)UL
RBC # FLD: 251 /CUMM (ref ?–1)
SODIUM SERPL-SCNC: 140 MMOL/L (ref 136–145)
TOTAL CELLS COUNTED BRONCH: 2506 /CUMM (ref ?–1)
TOTAL CELLS COUNTED FLD: 100
TROPONIN I SERPL HS-MCNC: 32 NG/L
WBC # BLD AUTO: 4 X10(3) UL (ref 4–11)
WBC CALC (IRIS) BRW: 2506 /CUMM

## 2023-11-22 PROCEDURE — 99233 SBSQ HOSP IP/OBS HIGH 50: CPT | Performed by: HOSPITALIST

## 2023-11-22 PROCEDURE — 99291 CRITICAL CARE FIRST HOUR: CPT | Performed by: INTERNAL MEDICINE

## 2023-11-22 PROCEDURE — 31624 DX BRONCHOSCOPE/LAVAGE: CPT | Performed by: INTERNAL MEDICINE

## 2023-11-22 PROCEDURE — 71045 X-RAY EXAM CHEST 1 VIEW: CPT | Performed by: INTERNAL MEDICINE

## 2023-11-22 PROCEDURE — 0B9G8ZX DRAINAGE OF LEFT UPPER LUNG LOBE, VIA NATURAL OR ARTIFICIAL OPENING ENDOSCOPIC, DIAGNOSTIC: ICD-10-PCS | Performed by: INTERNAL MEDICINE

## 2023-11-22 RX ORDER — LIDOCAINE HYDROCHLORIDE 40 MG/ML
3 INJECTION, SOLUTION RETROBULBAR; TOPICAL ONCE
Status: COMPLETED | OUTPATIENT
Start: 2023-11-22 | End: 2023-11-22

## 2023-11-22 RX ORDER — DEXAMETHASONE SODIUM PHOSPHATE 4 MG/ML
VIAL (ML) INJECTION AS NEEDED
Status: DISCONTINUED | OUTPATIENT
Start: 2023-11-22 | End: 2023-11-22 | Stop reason: SURG

## 2023-11-22 RX ORDER — GLYCOPYRROLATE 0.2 MG/ML
INJECTION, SOLUTION INTRAMUSCULAR; INTRAVENOUS AS NEEDED
Status: DISCONTINUED | OUTPATIENT
Start: 2023-11-22 | End: 2023-11-22 | Stop reason: SURG

## 2023-11-22 RX ORDER — BUMETANIDE 0.5 MG/1
0.5 TABLET ORAL DAILY
Status: DISCONTINUED | OUTPATIENT
Start: 2023-11-22 | End: 2023-11-22

## 2023-11-22 RX ORDER — LIDOCAINE HYDROCHLORIDE 10 MG/ML
INJECTION, SOLUTION EPIDURAL; INFILTRATION; INTRACAUDAL; PERINEURAL AS NEEDED
Status: DISCONTINUED | OUTPATIENT
Start: 2023-11-22 | End: 2023-11-22 | Stop reason: SURG

## 2023-11-22 RX ORDER — BUMETANIDE 0.25 MG/ML
2 INJECTION INTRAMUSCULAR; INTRAVENOUS
Status: DISCONTINUED | OUTPATIENT
Start: 2023-11-22 | End: 2023-11-24

## 2023-11-22 RX ADMIN — LIDOCAINE HYDROCHLORIDE 90 MG: 10 INJECTION, SOLUTION EPIDURAL; INFILTRATION; INTRACAUDAL; PERINEURAL at 11:03:00

## 2023-11-22 RX ADMIN — SODIUM CHLORIDE: 9 INJECTION, SOLUTION INTRAVENOUS at 11:13:00

## 2023-11-22 RX ADMIN — SODIUM CHLORIDE: 9 INJECTION, SOLUTION INTRAVENOUS at 11:01:00

## 2023-11-22 RX ADMIN — GLYCOPYRROLATE 0.2 MG: 0.2 INJECTION, SOLUTION INTRAMUSCULAR; INTRAVENOUS at 11:02:00

## 2023-11-22 RX ADMIN — DEXAMETHASONE SODIUM PHOSPHATE 8 MG: 4 MG/ML VIAL (ML) INJECTION at 11:02:00

## 2023-11-22 NOTE — PLAN OF CARE
No acute events overnight. On 3 L NC, saturating well. Vitals stable. NPO. Meds given, see eMAR. IVF infusing. Safety maintained, call light within reach. Hourly nursing rounds made. Plan for bronchoscopy today. Problem: Patient Centered Care  Goal: Patient preferences are identified and integrated in the patient's plan of care  Description: Interventions:  - What would you like us to know as we care for you?  From [facility]   - Provide timely, complete, and accurate information to patient/family  - Incorporate patient and family knowledge, values, beliefs, and cultural backgrounds into the planning and delivery of care  - Encourage patient/family to participate in care and decision-making at the level they choose  - Honor patient and family perspectives and choices  Outcome: Progressing     Problem: Diabetes/Glucose Control  Goal: Glucose maintained within prescribed range  Description: INTERVENTIONS:  - Monitor Blood Glucose as ordered  - Assess for signs and symptoms of hyperglycemia and hypoglycemia  - Administer ordered medications to maintain glucose within target range  - Assess barriers to adequate nutritional intake and initiate nutrition consult as needed  - Instruct patient on self management of diabetes  Outcome: Progressing     Problem: Patient/Family Goals  Goal: Patient/Family Long Term Goal  Description: Patient's Long Term Goal: Discharge to [facility]    Interventions:  - Monitor vs, assess sob, O2 per protocol  - See additional Care Plan goals for specific interventions  Outcome: Progressing  Goal: Patient/Family Short Term Goal  Description: Patient's Short Term Goal: Manage SOB    Interventions:   - assess lung sounds, dw, I&O, FR  - See additional Care Plan goals for specific interventions  Outcome: Progressing     Problem: CARDIOVASCULAR - ADULT  Goal: Maintains optimal cardiac output and hemodynamic stability  Description: INTERVENTIONS:  - Monitor vital signs, rhythm, and trends  - Monitor for bleeding, hypotension and signs of decreased cardiac output  - Evaluate effectiveness of vasoactive medications to optimize hemodynamic stability  - Monitor arterial and/or venous puncture sites for bleeding and/or hematoma  - Assess quality of pulses, skin color and temperature  - Assess for signs of decreased coronary artery perfusion - ex.  Angina  - Evaluate fluid balance, assess for edema, trend weights  Outcome: Progressing  Goal: Absence of cardiac arrhythmias or at baseline  Description: INTERVENTIONS:  - Continuous cardiac monitoring, monitor vital signs, obtain 12 lead EKG if indicated  - Evaluate effectiveness of antiarrhythmic and heart rate control medications as ordered  - Initiate emergency measures for life threatening arrhythmias  - Monitor electrolytes and administer replacement therapy as ordered  Outcome: Progressing     Problem: RESPIRATORY - ADULT  Goal: Achieves optimal ventilation and oxygenation  Description: INTERVENTIONS:  - Assess for changes in respiratory status  - Assess for changes in mentation and behavior  - Position to facilitate oxygenation and minimize respiratory effort  - Oxygen supplementation based on oxygen saturation or ABGs  - Provide Smoking Cessation handout, if applicable  - Encourage broncho-pulmonary hygiene including cough, deep breathe, Incentive Spirometry  - Assess the need for suctioning and perform as needed  - Assess and instruct to report SOB or any respiratory difficulty  - Respiratory Therapy support as indicated  - Manage/alleviate anxiety  - Monitor for signs/symptoms of CO2 retention  Outcome: Progressing     Problem: METABOLIC/FLUID AND ELECTROLYTES - ADULT  Goal: Glucose maintained within prescribed range  Description: INTERVENTIONS:  - Monitor Blood Glucose as ordered  - Assess for signs and symptoms of hyperglycemia and hypoglycemia  - Administer ordered medications to maintain glucose within target range  - Assess barriers to adequate nutritional intake and initiate nutrition consult as needed  - Instruct patient on self management of diabetes  Outcome: Progressing  Goal: Electrolytes maintained within normal limits  Description: INTERVENTIONS:  - Monitor labs and rhythm and assess patient for signs and symptoms of electrolyte imbalances  - Administer electrolyte replacement as ordered  - Monitor response to electrolyte replacements, including rhythm and repeat lab results as appropriate  - Fluid restriction as ordered  - Instruct patient on fluid and nutrition restrictions as appropriate  Outcome: Progressing     Problem: HEMATOLOGIC - ADULT  Goal: Free from bleeding injury  Description: (Example usage: patient with low platelets)  INTERVENTIONS:  - Avoid intramuscular injections, enemas and rectal medication administration  - Ensure safe mobilization of patient  - Hold pressure on venipuncture sites to achieve adequate hemostasis  - Assess for signs and symptoms of internal bleeding  - Monitor lab trends  - Patient is to report abnormal signs of bleeding to staff  - Avoid use of toothpicks and dental floss  - Use electric shaver for shaving  - Use soft bristle tooth brush  - Limit straining and forceful nose blowing  Outcome: Progressing

## 2023-11-22 NOTE — PHYSICAL THERAPY NOTE
PHYSICAL THERAPY TREATMENT NOTE - INPATIENT     Room Number: 210/210-A       Presenting Problem:  (acute on chronic CHF)       Problem List  Principal Problem:    Acute on chronic congestive heart failure, unspecified heart failure type [facility])  Active Problems:    Hypoxia    Acute hypoxemic respiratory failure ([address]      PHYSICAL THERAPY ASSESSMENT   Patient is a 70year old male admitted 11/16/2023 for Presenting Problem:  (acute on chronic CHF). PT cont to present with dec endurance, and remains on supplemental O2, currently 3LO2 pnc with good tolerance. Patient will benefit from continued inpatient physical therapy to address above issues so that patient may achieve highest functional mobility level. Pt ok to see per rn, pt recd in supine, educated in role of PT, goals for session, importance of consistent mobility. Pt is alert and oriented x 4, able to follow all commands. Pt demonstrating supine to sit transfer ind, good sitting balance, BP 92/62. Pt stood to rw with cga, gait training with rw with emphasis on pacing, energy conservation. Pt ambulated 20', then another [de-identified]' after standing rest break. On 3LO2, pt O2sat remained 95%. Pt returned to bed as transporter arrived to take pt down for bronchoscopy. Per pt, he has had this procedure in the past and mucous plug removed improving his breathing. Rn present end of session. BP end of session 109/62. The patient's Approx Degree of Impairment: 35.83% has been calculated based on documentation in the [facility] '6 clicks' Inpatient Basic Mobility Short Form. Research supports that patients with this level of impairment may benefit from return to [facility] with home PT, this cont to be the recommendation. DISCHARGE RECOMMENDATIONS  PT Discharge Recommendations: 24 hour care/supervision;Home with home health PT     PLAN  PT Treatment Plan: Bed mobility; Body mechanics; Patient education;Gait training;Transfer training;Balance training  Frequency (Obs): 5x/week    SUBJECTIVE  Pt rating RPE per Amauri scale 7/10    OBJECTIVE  Precautions: Bed/chair alarm    WEIGHT BEARING RESTRICTION                PAIN ASSESSMENT   Rating: Unable to rate  Location: chest, rn aware  Management Techniques: Activity promotion    BALANCE  Static Sitting: Good  Dynamic Sitting: Good  Static Standing: Fair -  Dynamic Standing: Fair -    ACTIVITY TOLERANCE           BP: 109/62 (92/62 prior to ambulation)  BP Location: Left arm  BP Method: Automatic  Patient Position: Sitting     O2 WALK  Oxygen Therapy  SPO2% on Oxygen at Rest: 97  At rest oxygen flow (liters per minute): 3  SPO2% Ambulation on Oxygen: 95  Ambulation oxygen flow (liters per minute): 3    AM-PAC '6-Clicks' INPATIENT SHORT FORM - BASIC MOBILITY  How much difficulty does the patient currently have. .. Patient Difficulty: Turning over in bed (including adjusting bedclothes, sheets and blankets)?: None   Patient Difficulty: Sitting down on and standing up from a chair with arms (e.g., wheelchair, bedside commode, etc.): A Little   Patient Difficulty: Moving from lying on back to sitting on the side of the bed?: None   How much help from another person does the patient currently need. .. Help from Another: Moving to and from a bed to a chair (including a wheelchair)?: A Little   Help from Another: Need to walk in hospital room?: A Little   Help from Another: Climbing 3-5 steps with a railing?: A Little     AM-PAC Score:  Raw Score: 20   Approx Degree of Impairment: 35.83%   Standardized Score (AM-PAC Scale): 47.67   CMS Modifier (G-Code): CJ    FUNCTIONAL ABILITY STATUS  Functional Mobility/Gait Assessment  Gait Assistance: Contact guard assist  Distance (ft): 20', 80'  Assistive Device: Rolling walker  Pattern:  (good pacing for energy conservation)        Patient End of Session: In bed; With [facility] staff;Needs met;Call light within reach;RN aware of session/findings; All patient questions and concerns addressed    CURRENT GOALS Goals to be met by:   Patient Goal Patient's self-stated goal is: home   Goal #1 Patient is able to demonstrate supine - sit EOB @ level: supervision      Goal #1   Current Status Goal met    Goal #2 Patient is able to demonstrate transfers Sit to/from Stand at assistance level: supervision with walker - rolling      Goal #2  Current Status CGA with rolling walker    Goal #3 Patient is able to ambulate 50 feet with assist device: walker - rolling at assistance level: supervision   Goal #3   Current Status 40ft x 1 60ft x 1 with rolling walker CGA   Goal #4 Patient will negotiate 3 stairs/one curb w/ assistive device and supervision   Goal #4   Current Status Not tested    Goal #5 Patient to demonstrate independence with home activity/exercise instructions provided to patient in preparation for discharge.    Goal #5   Current Status In progress   Goal #6     Goal #6  Current Status     Gait Trainin minutes  Therapeutic Activity: 15 minutes

## 2023-11-22 NOTE — OCCUPATIONAL THERAPY NOTE
11/22/23 0914   VISIT TYPE   OT Inpatient Visit Type  Attempted Treatment  Pt is being transported at this time off of medical floor for a Bronchoscopy.    OT Follow Up   Charge Missed visit   Follow Up Needed  Yes         Carlos Jordan OTR/L  [address]

## 2023-11-22 NOTE — RESPIRATORY THERAPY NOTE
Patient received intubated from endoscopy s/p bronch. Patient awoke after approximately twenty minutes in ICU. 1150:Placed on CPAP trial per Dr. Patricio Cameron. PS 5, CPAP 5, 30% FiO2    Patient tolerated well with RR 18-22/min, avg Vt 400 mL  RSBI 73  NIF -42  VC 0.94 L    1238: Extubated per Dr. Patricio Cameron. Placed on 4 L nasal cannula. Vocalization confirmed. Strong cough present. RT will continue to administer nebulizer treatments/monitor.     Problem: RESPIRATORY - ADULT  Goal: Achieves optimal ventilation and oxygenation  Description: INTERVENTIONS:  - Assess for changes in respiratory status  - Assess for changes in mentation and behavior  - Position to facilitate oxygenation and minimize respiratory effort  - Oxygen supplementation based on oxygen saturation or ABGs  - Provide Smoking Cessation handout, if applicable  - Encourage broncho-pulmonary hygiene including cough, deep breathe, Incentive Spirometry  - Assess the need for suctioning and perform as needed  - Assess and instruct to report SOB or any respiratory difficulty  - Respiratory Therapy support as indicated  - Manage/alleviate anxiety  - Monitor for signs/symptoms of CO2 retention  Outcome: Progressing

## 2023-11-22 NOTE — PROCEDURES
Bronchoscopy with BAL of left upper lobe    After MAC sedation achieved, video bronchoscope advanced through oral bite bite and vocal cords visualized which appeared normal in appearance. .  No lesions identified on cords. Bronchoscope advanced and trachea visualized with appeared grossly normal in appearance. Moderate amount of thick secretions suctioned throughout the trachea. Main adam was sharp and mobile. The bronchoscope was advanced into the right mainstem bronchus and the right upper, middle and lower lobe segmental and subsegmental anatomy was visualized with appeared normal in appearance without evidence of any lesions, inflammatory changes or significant secretions. The bronchoscope was next advanced into the left mainstem bronchus with evidence of significant mucous plugging within left mainstem bronchus. Thick secretions suctioned throughout left upper lobe and lingular segments. Left lower lobe stump visualized. The bronchoscope was next wedged into the left upper lobe lobe and 60 cc saline administered and lavage obtained. The patient tolerated the procedure well but afterwards evidence of worsening hypoxia with decreased respirations noted. Decision made to emergently intubate patient. Patient successfully intubated and transferred to ICU. Postprocedural findings: Significant mucus plugging seen throughout lower trachea and occluding left mainstem bronchus.     Bud Sunshine DO  Pulmonary [facility]

## 2023-11-22 NOTE — ANESTHESIA POSTPROCEDURE EVALUATION
Patient: Chucky Mabry    Procedure Summary       Date: 11/22/23 Room / Location: [address] [place] [address] ENDOSCOPY 05 / [address] [place] [address] ENDOSCOPY    Anesthesia Start: 9332 Anesthesia Stop:     Procedure: BRONCHOSCOPY Diagnosis: (left lung mucous plug)    Surgeons: Michael Hernandez DO Anesthesiologist: Mario Paul CRNA    Anesthesia Type: MAC ASA Status: 3            Anesthesia Type: MAC    Vitals Value Taken Time   BP 92/56 11/22/23 1137   Temp 97 11/22/23 1137   Pulse 80 11/22/23 1137   Resp 16 11/22/23 1137   SpO2 100 11/22/23 1137       [address] [place] [address] AN Post Evaluation:   Patient Evaluated in ICU  Patient Participation: waiting for patient participation  Level of Consciousness: awake  Pain Score: 0  Pain Management: adequate  Airway Patency:patent  Dental exam unchanged from preop  Yes    Cardiovascular Status: acceptable  Respiratory Status: acceptable and intubated  Postoperative Hydration acceptable      Celia Galarza CRNA  11/22/2023 11:37 AM

## 2023-11-22 NOTE — PROGRESS NOTES
11/22/23 0904   VISIT TYPE   SLP Inpatient Visit Type (Documentation Required) Attempted Treatment  (NPO for bronchoscopy)   FOLLOW UP/PLAN   Follow Up Needed (Documentation Required) Yes   SLP Follow-up Date 11/23/23     Stacey Mcgregor Pathologist  Phone Number Ext. [phone]

## 2023-11-22 NOTE — PLAN OF CARE
NPO for bronchoscopy this am. Post Bronch came back intubated, awake and trying to pull out tube. Placed on CPAP trial and extubated shortly after arrival back to unit. A/O X4. No complaints at this time Transitioned back to his diet. PT/OT worked with him earlier in the morning. Ambulated without difficulty. Problem: Patient Centered Care  Goal: Patient preferences are identified and integrated in the patient's plan of care  Description: Interventions:  - What would you like us to know as we care for you?  From [place]   - Provide timely, complete, and accurate information to patient/family  - Incorporate patient and family knowledge, values, beliefs, and cultural backgrounds into the planning and delivery of care  - Encourage patient/family to participate in care and decision-making at the level they choose  - Honor patient and family perspectives and choices  Outcome: Progressing     Problem: Diabetes/Glucose Control  Goal: Glucose maintained within prescribed range  Description: INTERVENTIONS:  - Monitor Blood Glucose as ordered  - Assess for signs and symptoms of hyperglycemia and hypoglycemia  - Administer ordered medications to maintain glucose within target range  - Assess barriers to adequate nutritional intake and initiate nutrition consult as needed  - Instruct patient on self management of diabetes  Outcome: Progressing     Problem: Patient/Family Goals  Goal: Patient/Family Long Term Goal  Description: Patient's Long Term Goal: Discharge to [facility]    Interventions:  - Monitor vs, assess sob, O2 per protocol  - See additional Care Plan goals for specific interventions  Outcome: Progressing  Goal: Patient/Family Short Term Goal  Description: Patient's Short Term Goal: Manage SOB    Interventions:   - assess lung sounds, dw, I&O, FR  - See additional Care Plan goals for specific interventions  Outcome: Progressing     Problem: CARDIOVASCULAR - ADULT  Goal: Maintains optimal cardiac output and hemodynamic stability  Description: INTERVENTIONS:  - Monitor vital signs, rhythm, and trends  - Monitor for bleeding, hypotension and signs of decreased cardiac output  - Evaluate effectiveness of vasoactive medications to optimize hemodynamic stability  - Monitor arterial and/or venous puncture sites for bleeding and/or hematoma  - Assess quality of pulses, skin color and temperature  - Assess for signs of decreased coronary artery perfusion - ex.  Angina  - Evaluate fluid balance, assess for edema, trend weights  Outcome: Progressing  Goal: Absence of cardiac arrhythmias or at baseline  Description: INTERVENTIONS:  - Continuous cardiac monitoring, monitor vital signs, obtain 12 lead EKG if indicated  - Evaluate effectiveness of antiarrhythmic and heart rate control medications as ordered  - Initiate emergency measures for life threatening arrhythmias  - Monitor electrolytes and administer replacement therapy as ordered  Outcome: Progressing     Problem: RESPIRATORY - ADULT  Goal: Achieves optimal ventilation and oxygenation  Description: INTERVENTIONS:  - Assess for changes in respiratory status  - Assess for changes in mentation and behavior  - Position to facilitate oxygenation and minimize respiratory effort  - Oxygen supplementation based on oxygen saturation or ABGs  - Provide Smoking Cessation handout, if applicable  - Encourage broncho-pulmonary hygiene including cough, deep breathe, Incentive Spirometry  - Assess the need for suctioning and perform as needed  - Assess and instruct to report SOB or any respiratory difficulty  - Respiratory Therapy support as indicated  - Manage/alleviate anxiety  - Monitor for signs/symptoms of CO2 retention  Outcome: Progressing     Problem: METABOLIC/FLUID AND ELECTROLYTES - ADULT  Goal: Glucose maintained within prescribed range  Description: INTERVENTIONS:  - Monitor Blood Glucose as ordered  - Assess for signs and symptoms of hyperglycemia and hypoglycemia  - Administer ordered medications to maintain glucose within target range  - Assess barriers to adequate nutritional intake and initiate nutrition consult as needed  - Instruct patient on self management of diabetes  Outcome: Progressing  Goal: Electrolytes maintained within normal limits  Description: INTERVENTIONS:  - Monitor labs and rhythm and assess patient for signs and symptoms of electrolyte imbalances  - Administer electrolyte replacement as ordered  - Monitor response to electrolyte replacements, including rhythm and repeat lab results as appropriate  - Fluid restriction as ordered  - Instruct patient on fluid and nutrition restrictions as appropriate  Outcome: Progressing     Problem: HEMATOLOGIC - ADULT  Goal: Free from bleeding injury  Description: (Example usage: patient with low platelets)  INTERVENTIONS:  - Avoid intramuscular injections, enemas and rectal medication administration  - Ensure safe mobilization of patient  - Hold pressure on venipuncture sites to achieve adequate hemostasis  - Assess for signs and symptoms of internal bleeding  - Monitor lab trends  - Patient is to report abnormal signs of bleeding to staff  - Avoid use of toothpicks and dental floss  - Use electric shaver for shaving  - Use soft bristle tooth brush  - Limit straining and forceful nose blowing  Outcome: Progressing

## 2023-11-23 LAB
ANION GAP SERPL CALC-SCNC: 3 MMOL/L (ref 0–18)
BUN BLD-MCNC: 13 MG/DL (ref 9–23)
BUN/CREAT SERPL: 6.3 (ref 10–20)
CALCIUM BLD-MCNC: 8.9 MG/DL (ref 8.7–10.4)
CHLORIDE SERPL-SCNC: 101 MMOL/L (ref 98–112)
CO2 SERPL-SCNC: 35 MMOL/L (ref 21–32)
CREAT BLD-MCNC: 2.08 MG/DL
DEPRECATED RDW RBC AUTO: 49.9 FL (ref 35.1–46.3)
EGFRCR SERPLBLD CKD-EPI 2021: 32 ML/MIN/1.73M2 (ref 60–?)
ERYTHROCYTE [DISTWIDTH] IN BLOOD BY AUTOMATED COUNT: 14.1 % (ref 11–15)
GLUCOSE BLD-MCNC: 258 MG/DL (ref 70–99)
GLUCOSE BLDC GLUCOMTR-MCNC: 223 MG/DL (ref 70–99)
GLUCOSE BLDC GLUCOMTR-MCNC: 226 MG/DL (ref 70–99)
GLUCOSE BLDC GLUCOMTR-MCNC: 299 MG/DL (ref 70–99)
HCT VFR BLD AUTO: 30.6 %
HGB BLD-MCNC: 9.3 G/DL
INR BLD: 2.45 (ref 0.8–1.2)
MCH RBC QN AUTO: 29 PG (ref 26–34)
MCHC RBC AUTO-ENTMCNC: 30.4 G/DL (ref 31–37)
MCV RBC AUTO: 95.3 FL
OSMOLALITY SERPL CALC.SUM OF ELEC: 297 MOSM/KG (ref 275–295)
PLATELET # BLD AUTO: 132 10(3)UL (ref 150–450)
POTASSIUM SERPL-SCNC: 4.6 MMOL/L (ref 3.5–5.1)
PROTHROMBIN TIME: 28.1 SECONDS (ref 11.6–14.8)
RBC # BLD AUTO: 3.21 X10(6)UL
SODIUM SERPL-SCNC: 139 MMOL/L (ref 136–145)
WBC # BLD AUTO: 7.7 X10(3) UL (ref 4–11)

## 2023-11-23 PROCEDURE — 99222 1ST HOSP IP/OBS MODERATE 55: CPT | Performed by: INTERNAL MEDICINE

## 2023-11-23 PROCEDURE — 99233 SBSQ HOSP IP/OBS HIGH 50: CPT | Performed by: HOSPITALIST

## 2023-11-23 NOTE — PLAN OF CARE
Problem: Patient Centered Care  Goal: Patient preferences are identified and integrated in the patient's plan of care  Description: Interventions:  - What would you like us to know as we care for you?  From [place]   - Provide timely, complete, and accurate information to patient/family  - Incorporate patient and family knowledge, values, beliefs, and cultural backgrounds into the planning and delivery of care  - Encourage patient/family to participate in care and decision-making at the level they choose  - Honor patient and family perspectives and choices  Outcome: Progressing     Problem: Diabetes/Glucose Control  Goal: Glucose maintained within prescribed range  Description: INTERVENTIONS:  - Monitor Blood Glucose as ordered  - Assess for signs and symptoms of hyperglycemia and hypoglycemia  - Administer ordered medications to maintain glucose within target range  - Assess barriers to adequate nutritional intake and initiate nutrition consult as needed  - Instruct patient on self management of diabetes  Outcome: Progressing     Problem: Patient/Family Goals  Goal: Patient/Family Long Term Goal  Description: Patient's Long Term Goal: Discharge to [facility]    Interventions:  - Monitor vs, assess sob, O2 per protocol  - See additional Care Plan goals for specific interventions  Outcome: Progressing  Goal: Patient/Family Short Term Goal  Description: Patient's Short Term Goal: Manage SOB    Interventions:   - assess lung sounds, dw, I&O, FR  - See additional Care Plan goals for specific interventions  Outcome: Progressing     Problem: CARDIOVASCULAR - ADULT  Goal: Maintains optimal cardiac output and hemodynamic stability  Description: INTERVENTIONS:  - Monitor vital signs, rhythm, and trends  - Monitor for bleeding, hypotension and signs of decreased cardiac output  - Evaluate effectiveness of vasoactive medications to optimize hemodynamic stability  - Monitor arterial and/or venous puncture sites for bleeding and/or hematoma  - Assess quality of pulses, skin color and temperature  - Assess for signs of decreased coronary artery perfusion - ex.  Angina  - Evaluate fluid balance, assess for edema, trend weights  Outcome: Progressing  Goal: Absence of cardiac arrhythmias or at baseline  Description: INTERVENTIONS:  - Continuous cardiac monitoring, monitor vital signs, obtain 12 lead EKG if indicated  - Evaluate effectiveness of antiarrhythmic and heart rate control medications as ordered  - Initiate emergency measures for life threatening arrhythmias  - Monitor electrolytes and administer replacement therapy as ordered  Outcome: Progressing     Problem: RESPIRATORY - ADULT  Goal: Achieves optimal ventilation and oxygenation  Description: INTERVENTIONS:  - Assess for changes in respiratory status  - Assess for changes in mentation and behavior  - Position to facilitate oxygenation and minimize respiratory effort  - Oxygen supplementation based on oxygen saturation or ABGs  - Provide Smoking Cessation handout, if applicable  - Encourage broncho-pulmonary hygiene including cough, deep breathe, Incentive Spirometry  - Assess the need for suctioning and perform as needed  - Assess and instruct to report SOB or any respiratory difficulty  - Respiratory Therapy support as indicated  - Manage/alleviate anxiety  - Monitor for signs/symptoms of CO2 retention  Outcome: Progressing     Problem: METABOLIC/FLUID AND ELECTROLYTES - ADULT  Goal: Glucose maintained within prescribed range  Description: INTERVENTIONS:  - Monitor Blood Glucose as ordered  - Assess for signs and symptoms of hyperglycemia and hypoglycemia  - Administer ordered medications to maintain glucose within target range  - Assess barriers to adequate nutritional intake and initiate nutrition consult as needed  - Instruct patient on self management of diabetes  Outcome: Progressing  Goal: Electrolytes maintained within normal limits  Description: INTERVENTIONS:  - Monitor labs and rhythm and assess patient for signs and symptoms of electrolyte imbalances  - Administer electrolyte replacement as ordered  - Monitor response to electrolyte replacements, including rhythm and repeat lab results as appropriate  - Fluid restriction as ordered  - Instruct patient on fluid and nutrition restrictions as appropriate  Outcome: Progressing     Problem: HEMATOLOGIC - ADULT  Goal: Free from bleeding injury  Description: (Example usage: patient with low platelets)  INTERVENTIONS:  - Avoid intramuscular injections, enemas and rectal medication administration  - Ensure safe mobilization of patient  - Hold pressure on venipuncture sites to achieve adequate hemostasis  - Assess for signs and symptoms of internal bleeding  - Monitor lab trends  - Patient is to report abnormal signs of bleeding to staff  - Avoid use of toothpicks and dental floss  - Use electric shaver for shaving  - Use soft bristle tooth brush  - Limit straining and forceful nose blowing  Outcome: Progressing   Patient s/p bronch yesterday with removal of mucus plug. Patient extubated shortly after arriving back in room. Patient on 4L O2 and states his breathing is improved. Patient still c/o chest pain; Morphine and Flexeril given. Patient getting nebs and chest PT. IV antibiotics ordered. VSS. Will continue to monitor patient.

## 2023-11-23 NOTE — RESPIRATORY THERAPY NOTE
Events    CPT performed. Percussor in conjunction with Mucomyst/DuoNeb. Patient finding it difficult to generate enough force to utilize flutter valve/acapella. Tolerated percussor and nebulizers well. Will continue to provide therapy and titrate support as indicated.

## 2023-11-24 ENCOUNTER — APPOINTMENT (OUTPATIENT)
Dept: ULTRASOUND IMAGING | Facility: HOSPITAL | Age: 78
DRG: 286 | End: 2023-11-24
Attending: HOSPITALIST
Payer: MEDICARE

## 2023-11-24 LAB
ALBUMIN SERPL-MCNC: 3.2 G/DL (ref 3.2–4.8)
ANION GAP SERPL CALC-SCNC: 1 MMOL/L (ref 0–18)
ANION GAP SERPL CALC-SCNC: 1 MMOL/L (ref 0–18)
ATRIAL RATE: 86 BPM
BASOPHILS # BLD AUTO: 0.02 X10(3) UL (ref 0–0.2)
BASOPHILS NFR BLD AUTO: 0.4 %
BUN BLD-MCNC: 12 MG/DL (ref 9–23)
BUN BLD-MCNC: 12 MG/DL (ref 9–23)
BUN/CREAT SERPL: 5.9 (ref 10–20)
BUN/CREAT SERPL: 5.9 (ref 10–20)
CALCIUM BLD-MCNC: 8.8 MG/DL (ref 8.7–10.4)
CALCIUM BLD-MCNC: 8.8 MG/DL (ref 8.7–10.4)
CHLORIDE SERPL-SCNC: 102 MMOL/L (ref 98–112)
CHLORIDE SERPL-SCNC: 102 MMOL/L (ref 98–112)
CO2 SERPL-SCNC: 36 MMOL/L (ref 21–32)
CO2 SERPL-SCNC: 36 MMOL/L (ref 21–32)
CREAT BLD-MCNC: 2.03 MG/DL
CREAT BLD-MCNC: 2.03 MG/DL
DEPRECATED RDW RBC AUTO: 49.7 FL (ref 35.1–46.3)
EGFRCR SERPLBLD CKD-EPI 2021: 33 ML/MIN/1.73M2 (ref 60–?)
EGFRCR SERPLBLD CKD-EPI 2021: 33 ML/MIN/1.73M2 (ref 60–?)
EOSINOPHIL # BLD AUTO: 0.03 X10(3) UL (ref 0–0.7)
EOSINOPHIL NFR BLD AUTO: 0.6 %
ERYTHROCYTE [DISTWIDTH] IN BLOOD BY AUTOMATED COUNT: 14.3 % (ref 11–15)
GLUCOSE BLD-MCNC: 241 MG/DL (ref 70–99)
GLUCOSE BLD-MCNC: 241 MG/DL (ref 70–99)
HCT VFR BLD AUTO: 30 %
HGB BLD-MCNC: 9.1 G/DL
IMM GRANULOCYTES # BLD AUTO: 0.01 X10(3) UL (ref 0–1)
IMM GRANULOCYTES NFR BLD: 0.2 %
INR BLD: 2.31 (ref 0.8–1.2)
LYMPHOCYTES # BLD AUTO: 0.6 X10(3) UL (ref 1–4)
LYMPHOCYTES NFR BLD AUTO: 11.9 %
MAGNESIUM SERPL-MCNC: 1.4 MG/DL (ref 1.6–2.6)
MCH RBC QN AUTO: 28.8 PG (ref 26–34)
MCHC RBC AUTO-ENTMCNC: 30.3 G/DL (ref 31–37)
MCV RBC AUTO: 94.9 FL
MONOCYTES # BLD AUTO: 0.56 X10(3) UL (ref 0.1–1)
MONOCYTES NFR BLD AUTO: 11.1 %
NEUTROPHILS # BLD AUTO: 3.82 X10 (3) UL (ref 1.5–7.7)
NEUTROPHILS # BLD AUTO: 3.82 X10(3) UL (ref 1.5–7.7)
NEUTROPHILS NFR BLD AUTO: 75.8 %
OSMOLALITY SERPL CALC.SUM OF ELEC: 296 MOSM/KG (ref 275–295)
OSMOLALITY SERPL CALC.SUM OF ELEC: 296 MOSM/KG (ref 275–295)
PHOSPHATE SERPL-MCNC: 1.8 MG/DL (ref 2.4–5.1)
PLATELET # BLD AUTO: 134 10(3)UL (ref 150–450)
POTASSIUM SERPL-SCNC: 3.9 MMOL/L (ref 3.5–5.1)
POTASSIUM SERPL-SCNC: 3.9 MMOL/L (ref 3.5–5.1)
PROTHROMBIN TIME: 26.8 SECONDS (ref 11.6–14.8)
Q-T INTERVAL: 398 MS
QRS DURATION: 138 MS
QTC CALCULATION (BEZET): 459 MS
R AXIS: 196 DEGREES
RBC # BLD AUTO: 3.16 X10(6)UL
SODIUM SERPL-SCNC: 139 MMOL/L (ref 136–145)
SODIUM SERPL-SCNC: 139 MMOL/L (ref 136–145)
T AXIS: -6 DEGREES
VENTRICULAR RATE: 80 BPM
WBC # BLD AUTO: 5 X10(3) UL (ref 4–11)

## 2023-11-24 PROCEDURE — 99232 SBSQ HOSP IP/OBS MODERATE 35: CPT | Performed by: INTERNAL MEDICINE

## 2023-11-24 PROCEDURE — 99233 SBSQ HOSP IP/OBS HIGH 50: CPT | Performed by: HOSPITALIST

## 2023-11-24 PROCEDURE — 93970 EXTREMITY STUDY: CPT | Performed by: HOSPITALIST

## 2023-11-24 RX ORDER — MAGNESIUM OXIDE 400 MG/1
800 TABLET ORAL ONCE
Status: COMPLETED | OUTPATIENT
Start: 2023-11-24 | End: 2023-11-24

## 2023-11-24 RX ORDER — BUMETANIDE 1 MG/1
1 TABLET ORAL
Status: DISCONTINUED | OUTPATIENT
Start: 2023-11-24 | End: 2023-11-28

## 2023-11-24 NOTE — PLAN OF CARE
Patient is A&Ox4, on 3L NC, no bowel movement overnight. AM metoprolol held for SBP<100. Magnesium and phosphate replaced. Problem: Patient Centered Care  Goal: Patient preferences are identified and integrated in the patient's plan of care  Description: Interventions:  - What would you like us to know as we care for you?  From [facility]   - Provide timely, complete, and accurate information to patient/family  - Incorporate patient and family knowledge, values, beliefs, and cultural backgrounds into the planning and delivery of care  - Encourage patient/family to participate in care and decision-making at the level they choose  - Honor patient and family perspectives and choices  Outcome: Progressing     Problem: Diabetes/Glucose Control  Goal: Glucose maintained within prescribed range  Description: INTERVENTIONS:  - Monitor Blood Glucose as ordered  - Assess for signs and symptoms of hyperglycemia and hypoglycemia  - Administer ordered medications to maintain glucose within target range  - Assess barriers to adequate nutritional intake and initiate nutrition consult as needed  - Instruct patient on self management of diabetes  Outcome: Progressing     Problem: Patient/Family Goals  Goal: Patient/Family Long Term Goal  Description: Patient's Long Term Goal: Discharge to [facility]    Interventions:  - Monitor vs, assess sob, O2 per protocol  - See additional Care Plan goals for specific interventions  Outcome: Progressing  Goal: Patient/Family Short Term Goal  Description: Patient's Short Term Goal: Manage SOB    Interventions:   - assess lung sounds, dw, I&O, FR  - See additional Care Plan goals for specific interventions  Outcome: Progressing     Problem: CARDIOVASCULAR - ADULT  Goal: Maintains optimal cardiac output and hemodynamic stability  Description: INTERVENTIONS:  - Monitor vital signs, rhythm, and trends  - Monitor for bleeding, hypotension and signs of decreased cardiac output  - Evaluate effectiveness of vasoactive medications to optimize hemodynamic stability  - Monitor arterial and/or venous puncture sites for bleeding and/or hematoma  - Assess quality of pulses, skin color and temperature  - Assess for signs of decreased coronary artery perfusion - ex.  Angina  - Evaluate fluid balance, assess for edema, trend weights  Outcome: Progressing  Goal: Absence of cardiac arrhythmias or at baseline  Description: INTERVENTIONS:  - Continuous cardiac monitoring, monitor vital signs, obtain 12 lead EKG if indicated  - Evaluate effectiveness of antiarrhythmic and heart rate control medications as ordered  - Initiate emergency measures for life threatening arrhythmias  - Monitor electrolytes and administer replacement therapy as ordered  Outcome: Progressing     Problem: RESPIRATORY - ADULT  Goal: Achieves optimal ventilation and oxygenation  Description: INTERVENTIONS:  - Assess for changes in respiratory status  - Assess for changes in mentation and behavior  - Position to facilitate oxygenation and minimize respiratory effort  - Oxygen supplementation based on oxygen saturation or ABGs  - Provide Smoking Cessation handout, if applicable  - Encourage broncho-pulmonary hygiene including cough, deep breathe, Incentive Spirometry  - Assess the need for suctioning and perform as needed  - Assess and instruct to report SOB or any respiratory difficulty  - Respiratory Therapy support as indicated  - Manage/alleviate anxiety  - Monitor for signs/symptoms of CO2 retention  Outcome: Progressing     Problem: METABOLIC/FLUID AND ELECTROLYTES - ADULT  Goal: Glucose maintained within prescribed range  Description: INTERVENTIONS:  - Monitor Blood Glucose as ordered  - Assess for signs and symptoms of hyperglycemia and hypoglycemia  - Administer ordered medications to maintain glucose within target range  - Assess barriers to adequate nutritional intake and initiate nutrition consult as needed  - Instruct patient on self management of diabetes  Outcome: Progressing  Goal: Electrolytes maintained within normal limits  Description: INTERVENTIONS:  - Monitor labs and rhythm and assess patient for signs and symptoms of electrolyte imbalances  - Administer electrolyte replacement as ordered  - Monitor response to electrolyte replacements, including rhythm and repeat lab results as appropriate  - Fluid restriction as ordered  - Instruct patient on fluid and nutrition restrictions as appropriate  Outcome: Progressing     Problem: HEMATOLOGIC - ADULT  Goal: Free from bleeding injury  Description: (Example usage: patient with low platelets)  INTERVENTIONS:  - Avoid intramuscular injections, enemas and rectal medication administration  - Ensure safe mobilization of patient  - Hold pressure on venipuncture sites to achieve adequate hemostasis  - Assess for signs and symptoms of internal bleeding  - Monitor lab trends  - Patient is to report abnormal signs of bleeding to staff  - Avoid use of toothpicks and dental floss  - Use electric shaver for shaving  - Use soft bristle tooth brush  - Limit straining and forceful nose blowing  Outcome: Progressing

## 2023-11-25 LAB
ANION GAP SERPL CALC-SCNC: 4 MMOL/L (ref 0–18)
BUN BLD-MCNC: 13 MG/DL (ref 9–23)
BUN/CREAT SERPL: 6.4 (ref 10–20)
CALCIUM BLD-MCNC: 8.9 MG/DL (ref 8.7–10.4)
CHLORIDE SERPL-SCNC: 97 MMOL/L (ref 98–112)
CO2 SERPL-SCNC: 39 MMOL/L (ref 21–32)
CREAT BLD-MCNC: 2.03 MG/DL
DEPRECATED RDW RBC AUTO: 49.1 FL (ref 35.1–46.3)
EGFRCR SERPLBLD CKD-EPI 2021: 33 ML/MIN/1.73M2 (ref 60–?)
ERYTHROCYTE [DISTWIDTH] IN BLOOD BY AUTOMATED COUNT: 14.6 % (ref 11–15)
GLUCOSE BLD-MCNC: 137 MG/DL (ref 70–99)
GLUCOSE BLDC GLUCOMTR-MCNC: 141 MG/DL (ref 70–99)
GLUCOSE BLDC GLUCOMTR-MCNC: 180 MG/DL (ref 70–99)
GLUCOSE BLDC GLUCOMTR-MCNC: 191 MG/DL (ref 70–99)
GLUCOSE BLDC GLUCOMTR-MCNC: 296 MG/DL (ref 70–99)
HCT VFR BLD AUTO: 28.8 %
HGB BLD-MCNC: 9 G/DL
INR BLD: 1.59 (ref 0.8–1.2)
INR BLD: 1.76 (ref 0.8–1.2)
MAGNESIUM SERPL-MCNC: 1.4 MG/DL (ref 1.6–2.6)
MCH RBC QN AUTO: 28.8 PG (ref 26–34)
MCHC RBC AUTO-ENTMCNC: 31.3 G/DL (ref 31–37)
MCV RBC AUTO: 92.3 FL
OSMOLALITY SERPL CALC.SUM OF ELEC: 292 MOSM/KG (ref 275–295)
PHOSPHATE SERPL-MCNC: 2.5 MG/DL (ref 2.4–5.1)
PLATELET # BLD AUTO: 151 10(3)UL (ref 150–450)
POTASSIUM SERPL-SCNC: 3.8 MMOL/L (ref 3.5–5.1)
PROTHROMBIN TIME: 20 SECONDS (ref 11.6–14.8)
PROTHROMBIN TIME: 21.6 SECONDS (ref 11.6–14.8)
RBC # BLD AUTO: 3.12 X10(6)UL
SODIUM SERPL-SCNC: 140 MMOL/L (ref 136–145)
WBC # BLD AUTO: 6 X10(3) UL (ref 4–11)

## 2023-11-25 PROCEDURE — 99233 SBSQ HOSP IP/OBS HIGH 50: CPT | Performed by: HOSPITALIST

## 2023-11-25 PROCEDURE — 99232 SBSQ HOSP IP/OBS MODERATE 35: CPT | Performed by: INTERNAL MEDICINE

## 2023-11-25 RX ORDER — MAGNESIUM OXIDE 400 MG/1
800 TABLET ORAL ONCE
Qty: 2 TABLET | Refills: 0 | Status: COMPLETED | OUTPATIENT
Start: 2023-11-25 | End: 2023-11-25

## 2023-11-25 RX ORDER — WARFARIN SODIUM 2.5 MG/1
2.5 TABLET ORAL NIGHTLY
Status: DISCONTINUED | OUTPATIENT
Start: 2023-11-25 | End: 2023-11-27

## 2023-11-25 RX ORDER — IPRATROPIUM BROMIDE AND ALBUTEROL SULFATE 2.5; .5 MG/3ML; MG/3ML
3 SOLUTION RESPIRATORY (INHALATION)
Status: DISCONTINUED | OUTPATIENT
Start: 2023-11-25 | End: 2023-11-27

## 2023-11-25 RX ORDER — WARFARIN SODIUM 5 MG/1
5 TABLET ORAL NIGHTLY
Status: DISCONTINUED | OUTPATIENT
Start: 2023-11-25 | End: 2023-11-25

## 2023-11-25 NOTE — PLAN OF CARE
No acute changes. Up to bathroom with assist. Prn pain meds given for constant 7/10 pain to chest. Per pt nothing helps chest pain, declines wanting heat pack. All safety measures in place. Problem: Patient Centered Care  Goal: Patient preferences are identified and integrated in the patient's plan of care  Description: Interventions:  - What would you like us to know as we care for you?  From [facility]   - Provide timely, complete, and accurate information to patient/family  - Incorporate patient and family knowledge, values, beliefs, and cultural backgrounds into the planning and delivery of care  - Encourage patient/family to participate in care and decision-making at the level they choose  - Honor patient and family perspectives and choices  11/25/2023 1726 by Ahmet Pettit RN  Outcome: Progressing  11/25/2023 1726 by Ahmet Pettit RN  Outcome: Progressing     Problem: Diabetes/Glucose Control  Goal: Glucose maintained within prescribed range  Description: INTERVENTIONS:  - Monitor Blood Glucose as ordered  - Assess for signs and symptoms of hyperglycemia and hypoglycemia  - Administer ordered medications to maintain glucose within target range  - Assess barriers to adequate nutritional intake and initiate nutrition consult as needed  - Instruct patient on self management of diabetes  11/25/2023 1726 by Ahmet Pettit RN  Outcome: Progressing  11/25/2023 1726 by Ahmet Pettit RN  Outcome: Progressing     Problem: Patient/Family Goals  Goal: Patient/Family Long Term Goal  Description: Patient's Long Term Goal: Discharge to [facility]    Interventions:  - Monitor vs, assess sob, O2 per protocol  - See additional Care Plan goals for specific interventions  11/25/2023 1726 by Ahmet Pettit RN  Outcome: Progressing  11/25/2023 1726 by Ahmet Pettit RN  Outcome: Progressing  Goal: Patient/Family Short Term Goal  Description: Patient's Short Term Goal: Manage SOB    Interventions:   - assess lung sounds, dw, I&O, FR  - See additional Care Plan goals for specific interventions  11/25/2023 1726 by Vanessa Antonio RN  Outcome: Progressing  11/25/2023 1726 by Vanessa Antonio RN  Outcome: Progressing     Problem: CARDIOVASCULAR - ADULT  Goal: Maintains optimal cardiac output and hemodynamic stability  Description: INTERVENTIONS:  - Monitor vital signs, rhythm, and trends  - Monitor for bleeding, hypotension and signs of decreased cardiac output  - Evaluate effectiveness of vasoactive medications to optimize hemodynamic stability  - Monitor arterial and/or venous puncture sites for bleeding and/or hematoma  - Assess quality of pulses, skin color and temperature  - Assess for signs of decreased coronary artery perfusion - ex.  Angina  - Evaluate fluid balance, assess for edema, trend weights  11/25/2023 1726 by Vanessa Antonio RN  Outcome: Progressing  11/25/2023 1726 by Vanessa Antonio RN  Outcome: Progressing  Goal: Absence of cardiac arrhythmias or at baseline  Description: INTERVENTIONS:  - Continuous cardiac monitoring, monitor vital signs, obtain 12 lead EKG if indicated  - Evaluate effectiveness of antiarrhythmic and heart rate control medications as ordered  - Initiate emergency measures for life threatening arrhythmias  - Monitor electrolytes and administer replacement therapy as ordered  11/25/2023 1726 by Vanessa Antonio RN  Outcome: Progressing  11/25/2023 1726 by Vanessa Antonio RN  Outcome: Progressing     Problem: RESPIRATORY - ADULT  Goal: Achieves optimal ventilation and oxygenation  Description: INTERVENTIONS:  - Assess for changes in respiratory status  - Assess for changes in mentation and behavior  - Position to facilitate oxygenation and minimize respiratory effort  - Oxygen supplementation based on oxygen saturation or ABGs  - Provide Smoking Cessation handout, if applicable  - Encourage broncho-pulmonary hygiene including cough, deep breathe, Incentive Spirometry  - Assess the need for suctioning and perform as needed  - Assess and instruct to report SOB or any respiratory difficulty  - Respiratory Therapy support as indicated  - Manage/alleviate anxiety  - Monitor for signs/symptoms of CO2 retention  11/25/2023 1726 by Edith Mejia RN  Outcome: Progressing  11/25/2023 1726 by Edith Mejia RN  Outcome: Progressing     Problem: METABOLIC/FLUID AND ELECTROLYTES - ADULT  Goal: Glucose maintained within prescribed range  Description: INTERVENTIONS:  - Monitor Blood Glucose as ordered  - Assess for signs and symptoms of hyperglycemia and hypoglycemia  - Administer ordered medications to maintain glucose within target range  - Assess barriers to adequate nutritional intake and initiate nutrition consult as needed  - Instruct patient on self management of diabetes  11/25/2023 1726 by Edith Mejia RN  Outcome: Progressing  11/25/2023 1726 by Edith Mejia RN  Outcome: Progressing  Goal: Electrolytes maintained within normal limits  Description: INTERVENTIONS:  - Monitor labs and rhythm and assess patient for signs and symptoms of electrolyte imbalances  - Administer electrolyte replacement as ordered  - Monitor response to electrolyte replacements, including rhythm and repeat lab results as appropriate  - Fluid restriction as ordered  - Instruct patient on fluid and nutrition restrictions as appropriate  11/25/2023 1726 by Edith Mejia RN  Outcome: Progressing  11/25/2023 1726 by Edith Mejia RN  Outcome: Progressing     Problem: HEMATOLOGIC - ADULT  Goal: Free from bleeding injury  Description: (Example usage: patient with low platelets)  INTERVENTIONS:  - Avoid intramuscular injections, enemas and rectal medication administration  - Ensure safe mobilization of patient  - Hold pressure on venipuncture sites to achieve adequate hemostasis  - Assess for signs and symptoms of internal bleeding  - Monitor lab trends  - Patient is to report abnormal signs of bleeding to staff  - Avoid use of toothpicks and dental floss  - Use electric shaver for shaving  - Use soft bristle tooth brush  - Limit straining and forceful nose blowing  11/25/2023 1726 by Edith Mejia RN  Outcome: Progressing  11/25/2023 1726 by Edith Mejia RN  Outcome: Progressing     Problem: PAIN - ADULT  Goal: Verbalizes/displays adequate comfort level or patient's stated pain goal  Description: INTERVENTIONS:  - Encourage pt to monitor pain and request assistance  - Assess pain using appropriate pain scale  - Administer analgesics based on type and severity of pain and evaluate response  - Implement non-pharmacological measures as appropriate and evaluate response  - Consider cultural and social influences on pain and pain management  - Manage/alleviate anxiety  - Utilize distraction and/or relaxation techniques  - Monitor for opioid side effects  - Notify MD/LIP if interventions unsuccessful or patient reports new pain  - Anticipate increased pain with activity and pre-medicate as appropriate  Outcome: Progressing

## 2023-11-25 NOTE — PLAN OF CARE
Patient A+Ox4 resting in bed. 2L nc overnight. Morphine given for pain. No shortness of breath. Plan to diurese. Safety precautions in place. Call light within reach. Problem: Patient Centered Care  Goal: Patient preferences are identified and integrated in the patient's plan of care  Description: Interventions:  - What would you like us to know as we care for you?  From [facility]   - Provide timely, complete, and accurate information to patient/family  - Incorporate patient and family knowledge, values, beliefs, and cultural backgrounds into the planning and delivery of care  - Encourage patient/family to participate in care and decision-making at the level they choose  - Honor patient and family perspectives and choices  11/25/2023 0328 by Billy Moss RN  Outcome: Progressing  11/25/2023 0328 by Billy Moss RN  Outcome: Progressing     Problem: Diabetes/Glucose Control  Goal: Glucose maintained within prescribed range  Description: INTERVENTIONS:  - Monitor Blood Glucose as ordered  - Assess for signs and symptoms of hyperglycemia and hypoglycemia  - Administer ordered medications to maintain glucose within target range  - Assess barriers to adequate nutritional intake and initiate nutrition consult as needed  - Instruct patient on self management of diabetes  11/25/2023 0328 by Billy Moss RN  Outcome: Progressing  11/25/2023 0328 by Billy Moss RN  Outcome: Progressing     Problem: Patient/Family Goals  Goal: Patient/Family Long Term Goal  Description: Patient's Long Term Goal: Discharge to [facility]    Interventions:  - Monitor vs, assess sob, O2 per protocol  - See additional Care Plan goals for specific interventions  11/25/2023 0328 by Billy Moss RN  Outcome: Progressing  11/25/2023 0328 by Billy Moss RN  Outcome: Progressing  Goal: Patient/Family Short Term Goal  Description: Patient's Short Term Goal: Manage SOB    Interventions:   - assess lung sounds, dw, I&O, FR  - See additional Care Plan goals for specific interventions  11/25/2023 0328 by Alanna Mera RN  Outcome: Progressing  11/25/2023 0328 by Alanna Mera RN  Outcome: Progressing     Problem: CARDIOVASCULAR - ADULT  Goal: Maintains optimal cardiac output and hemodynamic stability  Description: INTERVENTIONS:  - Monitor vital signs, rhythm, and trends  - Monitor for bleeding, hypotension and signs of decreased cardiac output  - Evaluate effectiveness of vasoactive medications to optimize hemodynamic stability  - Monitor arterial and/or venous puncture sites for bleeding and/or hematoma  - Assess quality of pulses, skin color and temperature  - Assess for signs of decreased coronary artery perfusion - ex.  Angina  - Evaluate fluid balance, assess for edema, trend weights  11/25/2023 0328 by Alanna Mera RN  Outcome: Progressing  11/25/2023 0328 by Alanna Mera RN  Outcome: Progressing  Goal: Absence of cardiac arrhythmias or at baseline  Description: INTERVENTIONS:  - Continuous cardiac monitoring, monitor vital signs, obtain 12 lead EKG if indicated  - Evaluate effectiveness of antiarrhythmic and heart rate control medications as ordered  - Initiate emergency measures for life threatening arrhythmias  - Monitor electrolytes and administer replacement therapy as ordered  11/25/2023 0328 by Alanna Mera RN  Outcome: Progressing  11/25/2023 0328 by Alanna Mera RN  Outcome: Progressing     Problem: RESPIRATORY - ADULT  Goal: Achieves optimal ventilation and oxygenation  Description: INTERVENTIONS:  - Assess for changes in respiratory status  - Assess for changes in mentation and behavior  - Position to facilitate oxygenation and minimize respiratory effort  - Oxygen supplementation based on oxygen saturation or ABGs  - Provide Smoking Cessation handout, if applicable  - Encourage broncho-pulmonary hygiene including cough, deep breathe, Incentive Spirometry  - Assess the need for suctioning and perform as needed  - Assess and instruct to report SOB or any respiratory difficulty  - Respiratory Therapy support as indicated  - Manage/alleviate anxiety  - Monitor for signs/symptoms of CO2 retention  11/25/2023 0328 by Billy Moss RN  Outcome: Progressing  11/25/2023 0328 by Billy Moss RN  Outcome: Progressing     Problem: METABOLIC/FLUID AND ELECTROLYTES - ADULT  Goal: Glucose maintained within prescribed range  Description: INTERVENTIONS:  - Monitor Blood Glucose as ordered  - Assess for signs and symptoms of hyperglycemia and hypoglycemia  - Administer ordered medications to maintain glucose within target range  - Assess barriers to adequate nutritional intake and initiate nutrition consult as needed  - Instruct patient on self management of diabetes  11/25/2023 0328 by Billy Moss RN  Outcome: Progressing  11/25/2023 0328 by Billy Moss RN  Outcome: Progressing  Goal: Electrolytes maintained within normal limits  Description: INTERVENTIONS:  - Monitor labs and rhythm and assess patient for signs and symptoms of electrolyte imbalances  - Administer electrolyte replacement as ordered  - Monitor response to electrolyte replacements, including rhythm and repeat lab results as appropriate  - Fluid restriction as ordered  - Instruct patient on fluid and nutrition restrictions as appropriate  11/25/2023 0328 by Billy Moss RN  Outcome: Progressing  11/25/2023 0328 by Billy Moss RN  Outcome: Progressing     Problem: HEMATOLOGIC - ADULT  Goal: Free from bleeding injury  Description: (Example usage: patient with low platelets)  INTERVENTIONS:  - Avoid intramuscular injections, enemas and rectal medication administration  - Ensure safe mobilization of patient  - Hold pressure on venipuncture sites to achieve adequate hemostasis  - Assess for signs and symptoms of internal bleeding  - Monitor lab trends  - Patient is to report abnormal signs of bleeding to staff  - Avoid use of toothpicks and dental floss  - Use electric shaver for shaving  - Use soft bristle tooth brush  - Limit straining and forceful nose blowing  11/25/2023 0328 by Igor To RN  Outcome: Progressing  11/25/2023 0328 by Igor To, RN  Outcome: Progressing

## 2023-11-26 LAB
BUN BLD-MCNC: 17 MG/DL (ref 9–23)
BUN/CREAT SERPL: 8.3 (ref 10–20)
CALCIUM BLD-MCNC: 9.1 MG/DL (ref 8.7–10.4)
CHLORIDE SERPL-SCNC: 94 MMOL/L (ref 98–112)
CO2 SERPL-SCNC: >40 MMOL/L (ref 21–32)
CREAT BLD-MCNC: 2.04 MG/DL
DEPRECATED RDW RBC AUTO: 47.8 FL (ref 35.1–46.3)
EGFRCR SERPLBLD CKD-EPI 2021: 33 ML/MIN/1.73M2 (ref 60–?)
ERYTHROCYTE [DISTWIDTH] IN BLOOD BY AUTOMATED COUNT: 14.4 % (ref 11–15)
GLUCOSE BLD-MCNC: 102 MG/DL (ref 70–99)
GLUCOSE BLDC GLUCOMTR-MCNC: 128 MG/DL (ref 70–99)
GLUCOSE BLDC GLUCOMTR-MCNC: 155 MG/DL (ref 70–99)
GLUCOSE BLDC GLUCOMTR-MCNC: 173 MG/DL (ref 70–99)
GLUCOSE BLDC GLUCOMTR-MCNC: 179 MG/DL (ref 70–99)
GLUCOSE BLDC GLUCOMTR-MCNC: 194 MG/DL (ref 70–99)
GLUCOSE BLDC GLUCOMTR-MCNC: 210 MG/DL (ref 70–99)
GLUCOSE BLDC GLUCOMTR-MCNC: 229 MG/DL (ref 70–99)
GLUCOSE BLDC GLUCOMTR-MCNC: 247 MG/DL (ref 70–99)
GLUCOSE BLDC GLUCOMTR-MCNC: 253 MG/DL (ref 70–99)
GLUCOSE BLDC GLUCOMTR-MCNC: 271 MG/DL (ref 70–99)
HCT VFR BLD AUTO: 28.4 %
HGB BLD-MCNC: 9.2 G/DL
INR BLD: 1.51 (ref 0.8–1.2)
MAGNESIUM SERPL-MCNC: 1.5 MG/DL (ref 1.6–2.6)
MCH RBC QN AUTO: 29.4 PG (ref 26–34)
MCHC RBC AUTO-ENTMCNC: 32.4 G/DL (ref 31–37)
MCV RBC AUTO: 90.7 FL
OSMOLALITY SERPL CALC.SUM OF ELEC: 290 MOSM/KG (ref 275–295)
PHOSPHATE SERPL-MCNC: 3.6 MG/DL (ref 2.4–5.1)
PLATELET # BLD AUTO: 170 10(3)UL (ref 150–450)
POTASSIUM SERPL-SCNC: 3.5 MMOL/L (ref 3.5–5.1)
PROTHROMBIN TIME: 19.1 SECONDS (ref 11.6–14.8)
RBC # BLD AUTO: 3.13 X10(6)UL
SODIUM SERPL-SCNC: 139 MMOL/L (ref 136–145)
WBC # BLD AUTO: 7 X10(3) UL (ref 4–11)

## 2023-11-26 PROCEDURE — 99232 SBSQ HOSP IP/OBS MODERATE 35: CPT | Performed by: INTERNAL MEDICINE

## 2023-11-26 PROCEDURE — 99239 HOSP IP/OBS DSCHRG MGMT >30: CPT | Performed by: HOSPITALIST

## 2023-11-26 RX ORDER — NITROGLYCERIN 0.4 MG/1
0.4 TABLET SUBLINGUAL EVERY 6 HOURS PRN
Qty: 28 TABLET | Refills: 0 | Status: SHIPPED | OUTPATIENT
Start: 2023-11-26 | End: 2023-11-28

## 2023-11-26 RX ORDER — SODIUM CHLORIDE 9 MG/ML
INJECTION, SOLUTION INTRAVENOUS CONTINUOUS
Status: DISCONTINUED | OUTPATIENT
Start: 2023-11-26 | End: 2023-11-27

## 2023-11-26 RX ORDER — MAGNESIUM OXIDE 400 MG/1
800 TABLET ORAL ONCE
Status: COMPLETED | OUTPATIENT
Start: 2023-11-26 | End: 2023-11-26

## 2023-11-26 RX ORDER — ASPIRIN 81 MG/1
81 TABLET, CHEWABLE ORAL DAILY
Qty: 30 TABLET | Refills: 0 | Status: SHIPPED | OUTPATIENT
Start: 2023-11-27

## 2023-11-26 RX ORDER — HYDROCODONE BITARTRATE AND ACETAMINOPHEN 5; 325 MG/1; MG/1
1 TABLET ORAL EVERY 6 HOURS PRN
Qty: 28 TABLET | Refills: 0 | Status: SHIPPED | OUTPATIENT
Start: 2023-11-26

## 2023-11-26 RX ORDER — NITROGLYCERIN 0.4 MG/1
0.4 TABLET SUBLINGUAL EVERY 6 HOURS PRN
Status: DISCONTINUED | OUTPATIENT
Start: 2023-11-26 | End: 2023-11-26

## 2023-11-26 RX ORDER — NITROGLYCERIN 0.4 MG/1
0.4 TABLET SUBLINGUAL EVERY 6 HOURS
Status: DISCONTINUED | OUTPATIENT
Start: 2023-11-26 | End: 2023-11-26

## 2023-11-26 RX ORDER — INSULIN GLARGINE 100 [IU]/ML
8 INJECTION, SOLUTION SUBCUTANEOUS NIGHTLY
Qty: 1 EACH | Refills: 0 | Status: SHIPPED | OUTPATIENT
Start: 2023-11-26

## 2023-11-26 RX ORDER — HYDROCODONE BITARTRATE AND ACETAMINOPHEN 5; 325 MG/1; MG/1
1 TABLET ORAL EVERY 6 HOURS PRN
Status: DISCONTINUED | OUTPATIENT
Start: 2023-11-26 | End: 2023-11-28

## 2023-11-26 NOTE — PLAN OF CARE
Discharge held for hypotension when standing up to get ready. IV fluids started. Chest pain remains 7/10, no relief with any medications, MD aware. Tolerating diet. Possible discharge tomorrow. Problem: Patient Centered Care  Goal: Patient preferences are identified and integrated in the patient's plan of care  Description: Interventions:  - What would you like us to know as we care for you?  From [place]   - Provide timely, complete, and accurate information to patient/family  - Incorporate patient and family knowledge, values, beliefs, and cultural backgrounds into the planning and delivery of care  - Encourage patient/family to participate in care and decision-making at the level they choose  - Honor patient and family perspectives and choices  Outcome: Progressing     Problem: Diabetes/Glucose Control  Goal: Glucose maintained within prescribed range  Description: INTERVENTIONS:  - Monitor Blood Glucose as ordered  - Assess for signs and symptoms of hyperglycemia and hypoglycemia  - Administer ordered medications to maintain glucose within target range  - Assess barriers to adequate nutritional intake and initiate nutrition consult as needed  - Instruct patient on self management of diabetes  Outcome: Progressing     Problem: Patient/Family Goals  Goal: Patient/Family Long Term Goal  Description: Patient's Long Term Goal: Discharge to [facility]    Interventions:  - Monitor vs, assess sob, O2 per protocol  - See additional Care Plan goals for specific interventions  Outcome: Progressing  Goal: Patient/Family Short Term Goal  Description: Patient's Short Term Goal: Manage SOB    Interventions:   - assess lung sounds, dw, I&O, FR  - See additional Care Plan goals for specific interventions  Outcome: Progressing     Problem: CARDIOVASCULAR - ADULT  Goal: Maintains optimal cardiac output and hemodynamic stability  Description: INTERVENTIONS:  - Monitor vital signs, rhythm, and trends  - Monitor for bleeding, hypotension and signs of decreased cardiac output  - Evaluate effectiveness of vasoactive medications to optimize hemodynamic stability  - Monitor arterial and/or venous puncture sites for bleeding and/or hematoma  - Assess quality of pulses, skin color and temperature  - Assess for signs of decreased coronary artery perfusion - ex.  Angina  - Evaluate fluid balance, assess for edema, trend weights  Outcome: Progressing  Goal: Absence of cardiac arrhythmias or at baseline  Description: INTERVENTIONS:  - Continuous cardiac monitoring, monitor vital signs, obtain 12 lead EKG if indicated  - Evaluate effectiveness of antiarrhythmic and heart rate control medications as ordered  - Initiate emergency measures for life threatening arrhythmias  - Monitor electrolytes and administer replacement therapy as ordered  Outcome: Progressing     Problem: RESPIRATORY - ADULT  Goal: Achieves optimal ventilation and oxygenation  Description: INTERVENTIONS:  - Assess for changes in respiratory status  - Assess for changes in mentation and behavior  - Position to facilitate oxygenation and minimize respiratory effort  - Oxygen supplementation based on oxygen saturation or ABGs  - Provide Smoking Cessation handout, if applicable  - Encourage broncho-pulmonary hygiene including cough, deep breathe, Incentive Spirometry  - Assess the need for suctioning and perform as needed  - Assess and instruct to report SOB or any respiratory difficulty  - Respiratory Therapy support as indicated  - Manage/alleviate anxiety  - Monitor for signs/symptoms of CO2 retention  Outcome: Progressing     Problem: METABOLIC/FLUID AND ELECTROLYTES - ADULT  Goal: Glucose maintained within prescribed range  Description: INTERVENTIONS:  - Monitor Blood Glucose as ordered  - Assess for signs and symptoms of hyperglycemia and hypoglycemia  - Administer ordered medications to maintain glucose within target range  - Assess barriers to adequate nutritional intake and initiate nutrition consult as needed  - Instruct patient on self management of diabetes  Outcome: Progressing  Goal: Electrolytes maintained within normal limits  Description: INTERVENTIONS:  - Monitor labs and rhythm and assess patient for signs and symptoms of electrolyte imbalances  - Administer electrolyte replacement as ordered  - Monitor response to electrolyte replacements, including rhythm and repeat lab results as appropriate  - Fluid restriction as ordered  - Instruct patient on fluid and nutrition restrictions as appropriate  Outcome: Progressing     Problem: HEMATOLOGIC - ADULT  Goal: Free from bleeding injury  Description: (Example usage: patient with low platelets)  INTERVENTIONS:  - Avoid intramuscular injections, enemas and rectal medication administration  - Ensure safe mobilization of patient  - Hold pressure on venipuncture sites to achieve adequate hemostasis  - Assess for signs and symptoms of internal bleeding  - Monitor lab trends  - Patient is to report abnormal signs of bleeding to staff  - Avoid use of toothpicks and dental floss  - Use electric shaver for shaving  - Use soft bristle tooth brush  - Limit straining and forceful nose blowing  Outcome: Progressing     Problem: PAIN - ADULT  Goal: Verbalizes/displays adequate comfort level or patient's stated pain goal  Description: INTERVENTIONS:  - Encourage pt to monitor pain and request assistance  - Assess pain using appropriate pain scale  - Administer analgesics based on type and severity of pain and evaluate response  - Implement non-pharmacological measures as appropriate and evaluate response  - Consider cultural and social influences on pain and pain management  - Manage/alleviate anxiety  - Utilize distraction and/or relaxation techniques  - Monitor for opioid side effects  - Notify MD/LIP if interventions unsuccessful or patient reports new pain  - Anticipate increased pain with activity and pre-medicate as appropriate  Outcome: Progressing

## 2023-11-26 NOTE — CM/SW NOTE
11/26/23 1217   Discharge disposition   Expected discharge disposition Home or [facility] Provider Home  [facility])   Discharge transportation [address]       Notified by RN pt is cleared for DC and needs [employer] ride. SW confirmed pt is not on O2 at this time. Per chart, pt's address says [address] but all notes say pt is from [facility]. RN confirmed pt lives at [facility]. Pt previously lived at [facility]. SW to change address on pt's demographics.     ASTER spoke to [facility] [employer] (medicaid ID # provided for coverage) set for ETA 3:30PM. PCS completed in [facility] - pt's RN to print w/ pt's AVS.    RN to notify [facility] of pt's return via phone #: [phone]    PLAN: [address] 3:30PM, PCS completed          LEO Presley, [facility]

## 2023-11-26 NOTE — SLP NOTE
SPEECH DAILY NOTE - INPATIENT    ASSESSMENT & PLAN   ASSESSMENT    Patient seen for dysphagia f/u per plan of care, goals addressed/met with details in grid below. RN authorizes visit, endorses tolerance for current diet. Patient received alert and upright in bed, afebrile, in NAD. Patient c/o intermittent odynophagia due to recent bronch with brief intubation and persistent cough. No family at bedside. Patient demonstrates adequate clinical tolerance for current diet with independent use of safe swallow precautions during this visit. Patient verbalizes understanding for dysphagia management and agreeable with referral for SLP consult on return to [facility] to ensure ongoing dysphagia management and monitor odynophagia/voice. No further acute SLP service appears indicated at this time, will sign off. Plan and recommendations reviewed with patient and RN at bedside. Written recommendations remain posted on whiteboard. Diet Recommendations - Solids: Regular  Diet Recommendations - Liquids: Thin Liquids  Compensatory Strategies Recommended: No straws; Slow rate;Small bites and sips; Chin tuck  Aspiration Precautions: Upright position; Slow rate;Small bites and sips; No straw  Medication Administration Recommendations:  (as tolerated)    Patient Experiencing Pain: No    Discharge Recommendations  Discharge Recommendations/Plan: Recommend SLP consult in [facility] to ensure ongoing dysphagia management and monitor odynophagia/voice    Interdisciplinary Communication: Discussed with RN  Recommendations posted at bedside          GOALS  Goal #1 The patient will tolerate regular consistency and thin liquids without overt signs or symptoms of aspiration with 100 % accuracy over 1-2 session(s). Patient self-administered bites of soft dry crackers and cup sips thin liquid water with oropharyngeal timing and control which appears adequate and without overt signs aspiration/distress.     Goal Met 11/26/23   Goal #2 The patient/family/caregiver will demonstrate understanding and implementation of aspiration precautions and swallow strategies independently over 1-2 session(s). Patient verbalized and demonstrated understanding during this visit. No family at bedside. Goal Met 11/26/23   Goal #3 The patient will utilize compensatory strategies as outlined by  BSSE (clinical evaluation) including Slow rate, Small bites, Small sips, No straws, Swallow/throat clear/swallow again, Upright 90 degrees, Upright 90 degrees 30 mins after meal, Eliminate distractions with min assistance 100 % of the time across 2 sessions. Patient positioned upright in bed, distractions minimized, safe swallow precautions reviewed/modeled. Patient implemented precautions with good effect following initial review.       Goal Met 11/26/23       FOLLOW UP  Follow Up Needed (Documentation Required): No    Session: 2 following BSE    If you have any questions, please contact Eva Lara, SLP  Eva Lara M.S. Krupa Brady Pathologist  [phone]

## 2023-11-26 NOTE — DISCHARGE SUMMARY
[facility] HOSP - [facility]    Discharge Summary    [address] [facility]  Patient Status:  Inpatient    1945 MRN [id number]   Location [address] [facility] Attending Callie Reyes MD   Hosp Day # 10 PCP None Pcp     Date of Admission: 2023   Date of Discharge: 2023    Hospital Discharge Diagnoses: Acute hypoxic respiratory failure     Lace+ Score: 66  59-90 High Risk  29-58 Medium Risk  0-28   Low Risk. TCM Follow-Up Recommendation:  LACE > 58: High Risk of readmission after discharge from the hospital.        Admitting Diagnosis: Hypoxia [R09.02]  Acute on chronic congestive heart failure, unspecified heart failure type ([facility] [address] [I50.9]    Disposition: Home    Discharge Diagnosis: . Principal Problem:    Acute on chronic congestive heart failure, unspecified heart failure type ([facility] [address]  Active Problems:    Hypoxia    Acute hypoxemic respiratory failure [facility])      Hospital Course:   Reason for Admission:   Per Dr. Pauline Denton    The patient is a 51-year-old  male who [facility], brought in today for evaluation of progressive shortness of breath and hypoxemia. He does use oxygen only at night, but he has been required oxygen all day long for last 2 to 3 days. Upon arrival to the emergency room, his pulse ox was 88% on room air, 4L nasal cannula 97%. Chemistry was unremarkable. GFR is 35 at baseline. Bicarbonate 35, potassium 3.3, BUN and creatinine of 17 and 1.96. Troponin was negative. ProBNP 453. GeneXpert viral panel was negative. INR supratherapeutic at 6.15. Chest x-ray showed cardiomegaly with possible left retrocardiac opacity. Procalcitonin still pending. He was started on IV Lasix, and he will be admitted to the hospital for further management. Discharge Physical Exam:   Physical Exam:    General: No acute distress. Respiratory: Clear to auscultation bilaterally. No wheezes. No rhonchi. Cardiovascular: S1, S2. Regular rate and rhythm.  No murmurs, rubs or gallops. Abdomen: Soft, nontender, nondistended. Positive bowel sounds. No rebound or guarding. Neurologic: No focal neurological deficits. Musculoskeletal: Moves all extremities. Hospital Course:     Acute on Chronic hypoxic respiratory failure   RLL ground glass opacities  Mucous plug L mainstem bronchus   Asp PNA   - supplemental o2 and wean as able. - Duonebs   - Pulmonary on consult.   - CT chest reviewed. - SLP eval regular diet. - IV zosyn empirically for now. - cx grew Candida- will start antifungals per Pulm   - WBC normal. PCT normal.   - Sputum cx normal.   - Genexpert neg. - Chest PT vest therapy QID  - Mucomyst inhalers. - CXR increasing opacity  - underwent bronchoscopy on 11/22- showed extensive mucus plugging in left mainstem bronchus     Chest Pain   Acute on chronic HFpEF   - EKG Paced  - was diuresed likely too much as now hypotensive.   - Trop normal. EKG reviewed. - Plans for angiogram 11/21 left heart cath showed no active disease  - hold metoprolol for sBP < 100  - Lipitor 80mg daily   - cards on consult. Hypotension  - Likely secondary to volume depletion   - resolved  - cont IV bumex    Chest pain  - reasssurred patient that chest pain is not cardiac in nature  - it will take time for it to resolve  - discussed with Cards and Pulm  - home on norco for pain and nitroglycerin      COPD   Asp PNA  - wears 2L NS nocturnal at home   - no wheezing at this time.   - CT chest and CXR reviewed. - Pulmonary on consult. - cont home meds. - no antibiotics at discharge   - Flutter valve. CAD s/p CABG and PCI  HFpEF LVEF 50% BiV pacing   H/o chronic pericardial effusion w/o hemodynamic compromise   - Cont home aspirin  - stop plavix   - Metoprolol   - Lisinopril  - cards on consult. - EKG Paced. Trop neg.   - Angiogram results reviewed   - Trop normal now.    - Lidoderm patch      Persistent A.fib   - continue on coumadin  - INR ok  - next inr check is Monday   - metoprolol  - stop plavix to avoid triple therapy   -resume coumadin   - inr check tomorrow      DM II   - Hba1c 7.7%   - resume Levemir 10 units at bedtime.   - ISS      H/o VTE  - h/o RUE and LLE DVT x 2 in past   - INR 2.4  - per EMR pt had DVT while on therapeutic Eliquis so switched to coumadin      Other medical problems  Hypothyroidism  CKD III  H/o pericardial effusion   Parkinson's disease. Orthostatic Hypotension      MDM: High        Quality:  DVT Prophylaxis: coumadin   CODE status: DNR/Select   Dispo: per clinical course          Complications: none    Consultants         Provider   Role Specialty     Mellott MD Casey  Consulting Physician Janet Calderon,   Consulting Physician PULMONARY DISEASES     Julissa Wong MD  Consulting Physician PULMONARY DISEASES          Surgical Procedures       Case IDs Date Procedure Surgeon Location Status    [id number] 11/22/23 BRONCHOSCOPY Claudene Junker,  [facility] ENDOSCOPY Comp          Pending Labs       Order Current Status    AFB Culture and Smear In process    AFB Culture(P) In process    Aspergillus Galactomannan Antigen Detection, Bronchoalveolar Lavage or Serum In process    Pneumocystis by PCR In process    FUNGUS CULTURE(P) Preliminary result    Fungus Culture and Stain Preliminary result            Discharge Plan:   Discharge Condition: Stable    Current Discharge Medication List        New Orders    Details   aspirin 81 MG Oral Chew Tab Chew 1 tablet (81 mg total) by mouth daily. HYDROcodone-acetaminophen 5-325 MG Oral Tab Take 1 tablet by mouth every 6 (six) hours as needed. nitroglycerin 0.4 MG Sublingual SL Tab Place 1 tablet (0.4 mg total) under the tongue every 6 (six) hours as needed for Chest pain. Home Meds - Modified    Details   insulin glargine 100 UNIT/ML Subcutaneous Solution Inject 8 Units into the skin nightly.            Home Meds - Unchanged    Details   levothyroxine 75 MCG Oral Tab Take 1 tablet (75 mcg total) by mouth before breakfast.      fluticasone propionate 50 MCG/ACT Nasal Suspension 2 sprays by Each Nare route daily. lisinopril 2.5 MG Oral Tab Take 1 tablet (2.5 mg total) by mouth daily. fluticasone-salmeterol 250-50 MCG/ACT Inhalation Aerosol Powder, Breath Activated Inhale 1 puff into the lungs every 12 (twelve) hours. bumetanide 0.5 MG Oral Tab Take 1 tablet (0.5 mg total) by mouth daily. pantoprazole 40 MG Oral Tab EC Take 1 tablet (40 mg total) by mouth every morning before breakfast.      tiotropium 18 MCG Inhalation Cap 1 capsule (18 mcg total) daily. sucralfate 1 g Oral Tab Take 1 tablet (1 g total) by mouth 4 (four) times daily before meals and nightly. warfarin 5 MG Oral Tab Take 1 tablet (5 mg total) by mouth daily. pregabalin 150 MG Oral Cap Take 1 capsule (150 mg total) by mouth in the morning and 1 capsule (150 mg total) at noon and 1 capsule (150 mg total) in the evening. finasteride 5 MG Oral Tab Take 1 tablet (5 mg total) by mouth daily. Metoprolol Succinate ER 25 MG Oral Tablet 24 Hr Take 0.5 tablets (12.5 mg total) by mouth Daily Beta Blocker. Hold for SBP<90      ferrous sulfate 325 (65 FE) MG Oral Tab EC Take 1 tablet (325 mg total) by mouth daily with breakfast.      Montelukast Sodium 10 MG Oral Tab Take 1 tablet (10 mg total) by mouth nightly. insulin detemir 100 UNIT/ML Subcutaneous Solution Inject into the skin 3 (three) times daily before meals. 1-3 units subcutaneous before meals PRN      Melatonin 5 MG Oral Tab Take 1 tablet (5 mg total) by mouth nightly. atorvastatin 80 MG Oral Tab Take 1 tablet (80 mg total) by mouth nightly. Ipratropium-Albuterol (COMBIVENT RESPIMAT)  MCG/ACT Inhalation Aero Soln Inhale 1 puff into the lungs 4 (four) times daily. docusate sodium (DOK) 100 MG Oral Cap Take 1 capsule (100 mg total) by mouth 2 (two) times daily.       carbidopa-levodopa  MG Oral Tab Take 2 tablets by mouth 4 (four) times daily. cyclobenzaprine 5 MG Oral Tab Take 1 tablet (5 mg total) by mouth 3 (three) times daily as needed (Pain). lidocaine-menthol 4-1 % External Patch Place 1 patch onto the skin daily. Midodrine HCl 10 MG Oral Tab Take 1 tablet (10 mg total) by mouth 3 (three) times daily as needed (to give if SBP is less than 100). magnesium hydroxide 400 MG/5ML Oral Suspension Take 30 mL by mouth daily as needed (upset stomach). Discharge Diet: Cardiac diet     Discharge Activity: As tolerated       Discharge Medications        START taking these medications        Instructions Prescription details   aspirin 81 MG Chew  Start taking on: November 27, 2023      Chew 1 tablet (81 mg total) by mouth daily. Quantity: 30 tablet  Refills: 0     HYDROcodone-acetaminophen 5-325 MG Tabs  Commonly known as: Norco      Take 1 tablet by mouth every 6 (six) hours as needed. Quantity: 28 tablet  Refills: 0     nitroglycerin 0.4 MG Subl  Commonly known as: Nitrostat      Place 1 tablet (0.4 mg total) under the tongue every 6 (six) hours as needed for Chest pain. Quantity: 28 tablet  Refills: 0            CHANGE how you take these medications        Instructions Prescription details   insulin glargine 100 UNIT/ML Soln  Commonly known as: Lantus  What changed: how much to take      Inject 8 Units into the skin nightly. Quantity: 1 each  Refills: 0            CONTINUE taking these medications        Instructions Prescription details   atorvastatin 80 MG Tabs  Commonly known as: Lipitor      Take 1 tablet (80 mg total) by mouth nightly. Refills: 0     bumetanide 0.5 MG Tabs  Commonly known as: Bumex      Take 1 tablet (0.5 mg total) by mouth daily. Refills: 0     carbidopa-levodopa  MG Tabs  Commonly known as: SINEMET      Take 2 tablets by mouth 4 (four) times daily.    Refills: 0     Combivent Respimat  MCG/ACT Aers  Generic drug: ipratropium-albuterol Inhale 1 puff into the lungs 4 (four) times daily. Refills: 0     cyclobenzaprine 5 MG Tabs  Commonly known as: Flexeril      Take 1 tablet (5 mg total) by mouth 3 (three) times daily as needed (Pain). Refills: 0      MG Caps  Generic drug: docusate sodium      Take 1 capsule (100 mg total) by mouth 2 (two) times daily. Refills: 0     ferrous sulfate 325 (65 FE) MG Tbec      Take 1 tablet (325 mg total) by mouth daily with breakfast.   Refills: 0     finasteride 5 MG Tabs  Commonly known as: Proscar      Take 1 tablet (5 mg total) by mouth daily. Refills: 0     fluticasone propionate 50 MCG/ACT Susp  Commonly known as: Flonase      2 sprays by Each Nare route daily. Refills: 0     fluticasone-salmeterol 250-50 MCG/ACT Aepb  Commonly known as: Advair Diskus      Inhale 1 puff into the lungs every 12 (twelve) hours. Refills: 0     insulin detemir 100 UNIT/ML Soln  Commonly known as: Levemir      Inject into the skin 3 (three) times daily before meals. 1-3 units subcutaneous before meals PRN   Refills: 0     levothyroxine 75 MCG Tabs  Commonly known as: Synthroid      Take 1 tablet (75 mcg total) by mouth before breakfast.   Refills: 0     lidocaine-menthol 4-1 % Ptch      Place 1 patch onto the skin daily. Refills: 0     lisinopril 2.5 MG Tabs  Commonly known as: Prinivil; Zestril      Take 1 tablet (2.5 mg total) by mouth daily. Refills: 0     magnesium hydroxide 400 MG/5ML Susp  Commonly known as: Milk of Magnesia      Take 30 mL by mouth daily as needed (upset stomach). Refills: 0     Melatonin 5 MG Tabs      Take 1 tablet (5 mg total) by mouth nightly. Refills: 0     metoprolol succinate ER 25 MG Tb24  Commonly known as: Toprol XL      Take 0.5 tablets (12.5 mg total) by mouth Daily Beta Blocker.  Hold for SBP<90   Refills: 0     midodrine 10 MG Tabs  Commonly known as: ProAmatine      Take 1 tablet (10 mg total) by mouth 3 (three) times daily as needed (to give if SBP is less than 100).   Refills: 0     montelukast 10 MG Tabs  Commonly known as: Singulair      Take 1 tablet (10 mg total) by mouth nightly. Refills: 0     pantoprazole 40 MG Tbec  Commonly known as: Protonix      Take 1 tablet (40 mg total) by mouth every morning before breakfast.   Refills: 0     pregabalin 150 MG Caps  Commonly known as: LYRICA      Take 1 capsule (150 mg total) by mouth in the morning and 1 capsule (150 mg total) at noon and 1 capsule (150 mg total) in the evening. Refills: 0     sucralfate 1 g Tabs  Commonly known as: Carafate      Take 1 tablet (1 g total) by mouth 4 (four) times daily before meals and nightly. Refills: 0     tiotropium 18 MCG Caps  Commonly known as: Spiriva Handihaler      1 capsule (18 mcg total) daily. Refills: 0     warfarin 5 MG Tabs  Commonly known as: Coumadin      Take 1 tablet (5 mg total) by mouth daily. Refills: 0            STOP taking these medications      acetaminophen 160 MG/5ML Liqd  Commonly known as: Tylenol        acetaminophen 325 MG Tabs  Commonly known as: Tylenol        amiodarone 200 MG Tabs  Commonly known as: Pacerone        clopidogrel 75 MG Tabs  Commonly known as: Plavix        metoclopramide 5 MG Tabs  Commonly known as: Reglan        metOLazone 2.5 MG Tabs  Commonly known as: Zaroxolyn        Siltussin  MG/5ML Syrp  Generic drug: guaiFENesin                  Where to Get Your Medications        Please  your prescriptions at the location directed by your doctor or nurse    Bring a paper prescription for each of these medications  aspirin 81 MG Chew  HYDROcodone-acetaminophen 5-325 MG Tabs  insulin glargine 100 UNIT/ML Soln  nitroglycerin 0.4 MG Subl         Follow up: Follow-up Information       Pcp, None Follow up in 1 week(s).     Contact information:  [place] [id number]                             Follow up Labs and imaging:         Time spent:  > 30 minutes    Mel Morin MD  11/26/2023

## 2023-11-26 NOTE — PLAN OF CARE
Pt complaining of pain with tylenol and heat packs not helping. Morphine given to help with pain instead, pt bilateral arms still swollen. Plan is to go back to [facility] once medically cleared. CO2 lab critical, pulmonology aware no new orders. Call light within reach, fall precautions in place, no acute changes. Problem: Patient Centered Care  Goal: Patient preferences are identified and integrated in the patient's plan of care  Description: Interventions:  - What would you like us to know as we care for you?  From [facility]   - Provide timely, complete, and accurate information to patient/family  - Incorporate patient and family knowledge, values, beliefs, and cultural backgrounds into the planning and delivery of care  - Encourage patient/family to participate in care and decision-making at the level they choose  - Honor patient and family perspectives and choices  Outcome: Progressing     Problem: Diabetes/Glucose Control  Goal: Glucose maintained within prescribed range  Description: INTERVENTIONS:  - Monitor Blood Glucose as ordered  - Assess for signs and symptoms of hyperglycemia and hypoglycemia  - Administer ordered medications to maintain glucose within target range  - Assess barriers to adequate nutritional intake and initiate nutrition consult as needed  - Instruct patient on self management of diabetes  Outcome: Progressing     Problem: Patient/Family Goals  Goal: Patient/Family Long Term Goal  Description: Patient's Long Term Goal: Discharge to [facility]    Interventions:  - Monitor vs, assess sob, O2 per protocol  - See additional Care Plan goals for specific interventions  Outcome: Progressing  Goal: Patient/Family Short Term Goal  Description: Patient's Short Term Goal: Manage SOB    Interventions:   - assess lung sounds, dw, I&O, FR  - See additional Care Plan goals for specific interventions  Outcome: Progressing     Problem: CARDIOVASCULAR - ADULT  Goal: Maintains optimal cardiac output and hemodynamic stability  Description: INTERVENTIONS:  - Monitor vital signs, rhythm, and trends  - Monitor for bleeding, hypotension and signs of decreased cardiac output  - Evaluate effectiveness of vasoactive medications to optimize hemodynamic stability  - Monitor arterial and/or venous puncture sites for bleeding and/or hematoma  - Assess quality of pulses, skin color and temperature  - Assess for signs of decreased coronary artery perfusion - ex.  Angina  - Evaluate fluid balance, assess for edema, trend weights  Outcome: Progressing  Goal: Absence of cardiac arrhythmias or at baseline  Description: INTERVENTIONS:  - Continuous cardiac monitoring, monitor vital signs, obtain 12 lead EKG if indicated  - Evaluate effectiveness of antiarrhythmic and heart rate control medications as ordered  - Initiate emergency measures for life threatening arrhythmias  - Monitor electrolytes and administer replacement therapy as ordered  Outcome: Progressing     Problem: RESPIRATORY - ADULT  Goal: Achieves optimal ventilation and oxygenation  Description: INTERVENTIONS:  - Assess for changes in respiratory status  - Assess for changes in mentation and behavior  - Position to facilitate oxygenation and minimize respiratory effort  - Oxygen supplementation based on oxygen saturation or ABGs  - Provide Smoking Cessation handout, if applicable  - Encourage broncho-pulmonary hygiene including cough, deep breathe, Incentive Spirometry  - Assess the need for suctioning and perform as needed  - Assess and instruct to report SOB or any respiratory difficulty  - Respiratory Therapy support as indicated  - Manage/alleviate anxiety  - Monitor for signs/symptoms of CO2 retention  Outcome: Progressing     Problem: METABOLIC/FLUID AND ELECTROLYTES - ADULT  Goal: Glucose maintained within prescribed range  Description: INTERVENTIONS:  - Monitor Blood Glucose as ordered  - Assess for signs and symptoms of hyperglycemia and hypoglycemia  - Administer ordered medications to maintain glucose within target range  - Assess barriers to adequate nutritional intake and initiate nutrition consult as needed  - Instruct patient on self management of diabetes  Outcome: Progressing  Goal: Electrolytes maintained within normal limits  Description: INTERVENTIONS:  - Monitor labs and rhythm and assess patient for signs and symptoms of electrolyte imbalances  - Administer electrolyte replacement as ordered  - Monitor response to electrolyte replacements, including rhythm and repeat lab results as appropriate  - Fluid restriction as ordered  - Instruct patient on fluid and nutrition restrictions as appropriate  Outcome: Progressing     Problem: HEMATOLOGIC - ADULT  Goal: Free from bleeding injury  Description: (Example usage: patient with low platelets)  INTERVENTIONS:  - Avoid intramuscular injections, enemas and rectal medication administration  - Ensure safe mobilization of patient  - Hold pressure on venipuncture sites to achieve adequate hemostasis  - Assess for signs and symptoms of internal bleeding  - Monitor lab trends  - Patient is to report abnormal signs of bleeding to staff  - Avoid use of toothpicks and dental floss  - Use electric shaver for shaving  - Use soft bristle tooth brush  - Limit straining and forceful nose blowing  Outcome: Progressing     Problem: PAIN - ADULT  Goal: Verbalizes/displays adequate comfort level or patient's stated pain goal  Description: INTERVENTIONS:  - Encourage pt to monitor pain and request assistance  - Assess pain using appropriate pain scale  - Administer analgesics based on type and severity of pain and evaluate response  - Implement non-pharmacological measures as appropriate and evaluate response  - Consider cultural and social influences on pain and pain management  - Manage/alleviate anxiety  - Utilize distraction and/or relaxation techniques  - Monitor for opioid side effects  - Notify MD/LIP if interventions unsuccessful or patient reports new pain  - Anticipate increased pain with activity and pre-medicate as appropriate  Outcome: Progressing

## 2023-11-27 ENCOUNTER — APPOINTMENT (OUTPATIENT)
Dept: CT IMAGING | Facility: HOSPITAL | Age: 78
DRG: 286 | End: 2023-11-27
Attending: INTERNAL MEDICINE
Payer: MEDICARE

## 2023-11-27 LAB
ANION GAP SERPL CALC-SCNC: 0 MMOL/L (ref 0–18)
BUN BLD-MCNC: 19 MG/DL (ref 9–23)
BUN/CREAT SERPL: 9.6 (ref 10–20)
CALCIUM BLD-MCNC: 8.9 MG/DL (ref 8.7–10.4)
CHLORIDE SERPL-SCNC: 97 MMOL/L (ref 98–112)
CO2 SERPL-SCNC: 40 MMOL/L (ref 21–32)
CREAT BLD-MCNC: 1.98 MG/DL
DEPRECATED RDW RBC AUTO: 47.5 FL (ref 35.1–46.3)
EGFRCR SERPLBLD CKD-EPI 2021: 34 ML/MIN/1.73M2 (ref 60–?)
ERYTHROCYTE [DISTWIDTH] IN BLOOD BY AUTOMATED COUNT: 14.4 % (ref 11–15)
GLUCOSE BLD-MCNC: 115 MG/DL (ref 70–99)
GLUCOSE BLDC GLUCOMTR-MCNC: 108 MG/DL (ref 70–99)
GLUCOSE BLDC GLUCOMTR-MCNC: 114 MG/DL (ref 70–99)
GLUCOSE BLDC GLUCOMTR-MCNC: 128 MG/DL (ref 70–99)
GLUCOSE BLDC GLUCOMTR-MCNC: 136 MG/DL (ref 70–99)
GLUCOSE BLDC GLUCOMTR-MCNC: 99 MG/DL (ref 70–99)
HCT VFR BLD AUTO: 28.2 %
HGB BLD-MCNC: 9.1 G/DL
INR BLD: 1.4 (ref 0.8–1.2)
INR BLD: 1.45 (ref 0.8–1.2)
MAGNESIUM SERPL-MCNC: 1.7 MG/DL (ref 1.6–2.6)
MCH RBC QN AUTO: 29.3 PG (ref 26–34)
MCHC RBC AUTO-ENTMCNC: 32.3 G/DL (ref 31–37)
MCV RBC AUTO: 90.7 FL
OSMOLALITY SERPL CALC.SUM OF ELEC: 287 MOSM/KG (ref 275–295)
PLATELET # BLD AUTO: 177 10(3)UL (ref 150–450)
POTASSIUM SERPL-SCNC: 3.7 MMOL/L (ref 3.5–5.1)
PROTHROMBIN TIME: 18 SECONDS (ref 11.6–14.8)
PROTHROMBIN TIME: 18.5 SECONDS (ref 11.6–14.8)
RBC # BLD AUTO: 3.11 X10(6)UL
SODIUM SERPL-SCNC: 137 MMOL/L (ref 136–145)
WBC # BLD AUTO: 11.1 X10(3) UL (ref 4–11)

## 2023-11-27 PROCEDURE — 70450 CT HEAD/BRAIN W/O DYE: CPT | Performed by: INTERNAL MEDICINE

## 2023-11-27 PROCEDURE — 99232 SBSQ HOSP IP/OBS MODERATE 35: CPT | Performed by: INTERNAL MEDICINE

## 2023-11-27 PROCEDURE — 99233 SBSQ HOSP IP/OBS HIGH 50: CPT | Performed by: HOSPITALIST

## 2023-11-27 RX ORDER — MIDODRINE HYDROCHLORIDE 5 MG/1
10 TABLET ORAL
Status: DISCONTINUED | OUTPATIENT
Start: 2023-11-27 | End: 2023-11-28

## 2023-11-27 RX ORDER — IPRATROPIUM BROMIDE AND ALBUTEROL SULFATE 2.5; .5 MG/3ML; MG/3ML
3 SOLUTION RESPIRATORY (INHALATION)
Status: DISCONTINUED | OUTPATIENT
Start: 2023-11-27 | End: 2023-11-28

## 2023-11-27 RX ORDER — MIDODRINE HYDROCHLORIDE 10 MG/1
10 TABLET ORAL
Qty: 60 TABLET | Refills: 0 | Status: SHIPPED | OUTPATIENT
Start: 2023-11-27 | End: 2023-11-28

## 2023-11-27 RX ORDER — MECLIZINE HCL 12.5 MG/1
12.5 TABLET ORAL 3 TIMES DAILY PRN
Status: DISCONTINUED | OUTPATIENT
Start: 2023-11-27 | End: 2023-11-28

## 2023-11-27 RX ORDER — MAGNESIUM OXIDE 400 MG/1
400 TABLET ORAL ONCE
Status: COMPLETED | OUTPATIENT
Start: 2023-11-27 | End: 2023-11-27

## 2023-11-27 RX ORDER — ACETAMINOPHEN 325 MG/1
650 TABLET ORAL EVERY 6 HOURS PRN
Status: DISCONTINUED | OUTPATIENT
Start: 2023-11-27 | End: 2023-11-28

## 2023-11-27 RX ORDER — WARFARIN SODIUM 5 MG/1
5 TABLET ORAL NIGHTLY
Status: DISCONTINUED | OUTPATIENT
Start: 2023-11-27 | End: 2023-11-28

## 2023-11-27 NOTE — CARDIAC REHAB
Cardiac rehab order received, and chart review completed. Met with patient, bedside to discuss Heart failure, and any cardiac issues that are concerning to patient. Patient states,primarily concerned regarding complaint of headache, dizziness, and continuation of home 02. Pt anticipating discharge home to [facility]. No need for further intervention at this time. Will continue to monitor patient needs regarding any cardiac education during this admission.

## 2023-11-27 NOTE — PLAN OF CARE
Pt reporting feeling dizzy, pale, and weak while ambulating to bathroom. Once back in bed and resting pt reported feeling better with vitals stable. Patient having loose stools, docusate held. Complains of a headache 8/10 but fell asleep. Later on in AM patient reported prns not helping but making him 'feel worse'. Early morning patient was feeling dizzy again, still pale, headache 7/10 Dr Lily Burns notified with vital and blood sugr normal. PRN tylenol added, meclizine, and plan for CT head for the headache and dizziness. Call light within reach, fall precautions in place. Problem: Patient Centered Care  Goal: Patient preferences are identified and integrated in the patient's plan of care  Description: Interventions:  - What would you like us to know as we care for you?  From [facility]   - Provide timely, complete, and accurate information to patient/family  - Incorporate patient and family knowledge, values, beliefs, and cultural backgrounds into the planning and delivery of care  - Encourage patient/family to participate in care and decision-making at the level they choose  - Honor patient and family perspectives and choices  Outcome: Progressing     Problem: Diabetes/Glucose Control  Goal: Glucose maintained within prescribed range  Description: INTERVENTIONS:  - Monitor Blood Glucose as ordered  - Assess for signs and symptoms of hyperglycemia and hypoglycemia  - Administer ordered medications to maintain glucose within target range  - Assess barriers to adequate nutritional intake and initiate nutrition consult as needed  - Instruct patient on self management of diabetes  Outcome: Progressing     Problem: Patient/Family Goals  Goal: Patient/Family Long Term Goal  Description: Patient's Long Term Goal: [facility] to [facility]    Interventions:  - Monitor vs, assess sob, O2 per protocol  - See additional Care Plan goals for specific interventions  Outcome: Progressing  Goal: Patient/Family Short Term Goal  Description: Patient's Short Term Goal: Manage SOB    Interventions:   - assess lung sounds, dw, I&O, FR  - See additional Care Plan goals for specific interventions  Outcome: Progressing     Problem: CARDIOVASCULAR - ADULT  Goal: Maintains optimal cardiac output and hemodynamic stability  Description: INTERVENTIONS:  - Monitor vital signs, rhythm, and trends  - Monitor for bleeding, hypotension and signs of decreased cardiac output  - Evaluate effectiveness of vasoactive medications to optimize hemodynamic stability  - Monitor arterial and/or venous puncture sites for bleeding and/or hematoma  - Assess quality of pulses, skin color and temperature  - Assess for signs of decreased coronary artery perfusion - ex.  Angina  - Evaluate fluid balance, assess for edema, trend weights  Outcome: Progressing  Goal: Absence of cardiac arrhythmias or at baseline  Description: INTERVENTIONS:  - Continuous cardiac monitoring, monitor vital signs, obtain 12 lead EKG if indicated  - Evaluate effectiveness of antiarrhythmic and heart rate control medications as ordered  - Initiate emergency measures for life threatening arrhythmias  - Monitor electrolytes and administer replacement therapy as ordered  Outcome: Progressing     Problem: RESPIRATORY - ADULT  Goal: Achieves optimal ventilation and oxygenation  Description: INTERVENTIONS:  - Assess for changes in respiratory status  - Assess for changes in mentation and behavior  - Position to facilitate oxygenation and minimize respiratory effort  - Oxygen supplementation based on oxygen saturation or ABGs  - Provide Smoking Cessation handout, if applicable  - Encourage broncho-pulmonary hygiene including cough, deep breathe, Incentive Spirometry  - Assess the need for suctioning and perform as needed  - Assess and instruct to re  Problem: METABOLIC/FLUID AND ELECTROLYTES - ADULT  Goal: Glucose maintained within prescribed range  Description: INTERVENTIONS:  - Monitor Blood Glucose as ordered  - Assess for signs and symptoms of hyperglycemia and hypoglycemia  - Administer ordered medications to maintain glucose within target range  - Assess barriers to adequate nutritional intake and initiate nutrition consult as needed  - Instruct patient on self management of diabetes  Outcome: Progressing  Goal: Electrolytes maintained within normal limits  Description: INTERVENTIONS:  - Monitor labs and rhythm and assess patient for signs and symptoms of electrolyte imbalances  - Administer electrolyte replacement as ordered  - Monitor response to electrolyte replacements, including rhythm and repeat lab results as appropriate  - Fluid restriction as ordered  - Instruct patient on fluid and nutrition restrictions as appropriate  Outcome: Progressing     Problem: HEMATOLOGIC - ADULT  Goal: Free from bleeding injury  Description: (Example usage: patient with low platelets)  INTERVENTIONS:  - Avoid intramuscular injections, enemas and rectal medication administration  - Ensure safe mobilization of patient  - Hold pressure on venipuncture sites to achieve adequate hemostasis  - Assess for signs and symptoms of internal bleeding  - Monitor lab trends  - Patient is to report abnormal signs of bleeding to staff  - Avoid use of toothpicks and dental floss  - Use electric shaver for shaving  - Use soft bristle tooth brush  - Limit straining and forceful nose blowing  Outcome: Progressing     Problem: PAIN - ADULT  Goal: Verbalizes/displays adequate comfort level or patient's stated pain goal  Description: INTERVENTIONS:  - Encourage pt to monitor pain and request assistance  - Assess pain using appropriate pain scale  - Administer analgesics based on type and severity of pain and evaluate response  - Implement non-pharmacological measures as appropriate and evaluate response  - Consider cultural and social influences on pain and pain management  - Manage/alleviate anxiety  - Utilize distraction and/or relaxation techniques  - Monitor for opioid side effects  - Notify MD/LIP if interventions unsuccessful or patient reports new pain  - Anticipate increased pain with activity and pre-medicate as appropriate  Outcome: Progressing   port SOB or any respiratory difficulty  - Respiratory Therapy support as indicated  - Manage/alleviate anxiety  - Monitor for signs/symptoms of CO2 retention  Outcome: Progressing

## 2023-11-27 NOTE — PLAN OF CARE
Pt alert and oriented x4. Pt on room air. Pt completed head ct. Pt ambulating 1 assist. Plan to discharge to [facility]. Midodrine scheduled and ivf stopped per MD. Plan for dc tomorrow to [facility] tomorrow. Physical therapy saw patient today. Problem: Patient Centered Care  Goal: Patient preferences are identified and integrated in the patient's plan of care  Description: Interventions:  - What would you like us to know as we care for you?  From [facility]   - Provide timely, complete, and accurate information to patient/family  - Incorporate patient and family knowledge, values, beliefs, and cultural backgrounds into the planning and delivery of care  - Encourage patient/family to participate in care and decision-making at the level they choose  - Honor patient and family perspectives and choices  Outcome: Progressing     Problem: Diabetes/Glucose Control  Goal: Glucose maintained within prescribed range  Description: INTERVENTIONS:  - Monitor Blood Glucose as ordered  - Assess for signs and symptoms of hyperglycemia and hypoglycemia  - Administer ordered medications to maintain glucose within target range  - Assess barriers to adequate nutritional intake and initiate nutrition consult as needed  - Instruct patient on self management of diabetes  Outcome: Progressing     Problem: Patient/Family Goals  Goal: Patient/Family Long Term Goal  Description: Patient's Long Term Goal: Discharge to [facility]    Interventions:  - Monitor vs, assess sob, O2 per protocol  - See additional Care Plan goals for specific interventions  Outcome: Progressing  Goal: Patient/Family Short Term Goal  Description: Patient's Short Term Goal: Manage SOB    Interventions:   - assess lung sounds, dw, I&O, FR  - See additional Care Plan goals for specific interventions  Outcome: Progressing     Problem: CARDIOVASCULAR - ADULT  Goal: Maintains optimal cardiac output and hemodynamic stability  Description: INTERVENTIONS:  - Monitor vital signs, rhythm, and trends  - Monitor for bleeding, hypotension and signs of decreased cardiac output  - Evaluate effectiveness of vasoactive medications to optimize hemodynamic stability  - Monitor arterial and/or venous puncture sites for bleeding and/or hematoma  - Assess quality of pulses, skin color and temperature  - Assess for signs of decreased coronary artery perfusion - ex.  Angina  - Evaluate fluid balance, assess for edema, trend weights  Outcome: Progressing  Goal: Absence of cardiac arrhythmias or at baseline  Description: INTERVENTIONS:  - Continuous cardiac monitoring, monitor vital signs, obtain 12 lead EKG if indicated  - Evaluate effectiveness of antiarrhythmic and heart rate control medications as ordered  - Initiate emergency measures for life threatening arrhythmias  - Monitor electrolytes and administer replacement therapy as ordered  Outcome: Progressing     Problem: RESPIRATORY - ADULT  Goal: Achieves optimal ventilation and oxygenation  Description: INTERVENTIONS:  - Assess for changes in respiratory status  - Assess for changes in mentation and behavior  - Position to facilitate oxygenation and minimize respiratory effort  - Oxygen supplementation based on oxygen saturation or ABGs  - Provide Smoking Cessation handout, if applicable  - Encourage broncho-pulmonary hygiene including cough, deep breathe, Incentive Spirometry  - Assess the need for suctioning and perform as needed  - Assess and instruct to report SOB or any respiratory difficulty  - Respiratory Therapy support as indicated  - Manage/alleviate anxiety  - Monitor for signs/symptoms of CO2 retention  Outcome: Progressing     Problem: METABOLIC/FLUID AND ELECTROLYTES - ADULT  Goal: Glucose maintained within prescribed range  Description: INTERVENTIONS:  - Monitor Blood Glucose as ordered  - Assess for signs and symptoms of hyperglycemia and hypoglycemia  - Administer ordered medications to maintain glucose within target range  - Assess barriers to adequate nutritional intake and initiate nutrition consult as needed  - Instruct patient on self management of diabetes  Outcome: Progressing  Goal: Electrolytes maintained within normal limits  Description: INTERVENTIONS:  - Monitor labs and rhythm and assess patient for signs and symptoms of electrolyte imbalances  - Administer electrolyte replacement as ordered  - Monitor response to electrolyte replacements, including rhythm and repeat lab results as appropriate  - Fluid restriction as ordered  - Instruct patient on fluid and nutrition restrictions as appropriate  Outcome: Progressing     Problem: HEMATOLOGIC - ADULT  Goal: Free from bleeding injury  Description: (Example usage: patient with low platelets)  INTERVENTIONS:  - Avoid intramuscular injections, enemas and rectal medication administration  - Ensure safe mobilization of patient  - Hold pressure on venipuncture sites to achieve adequate hemostasis  - Assess for signs and symptoms of internal bleeding  - Monitor lab trends  - Patient is to report abnormal signs of bleeding to staff  - Avoid use of toothpicks and dental floss  - Use electric shaver for shaving  - Use soft bristle tooth brush  - Limit straining and forceful nose blowing  Outcome: Progressing     Problem: PAIN - ADULT  Goal: Verbalizes/displays adequate comfort level or patient's stated pain goal  Description: INTERVENTIONS:  - Encourage pt to monitor pain and request assistance  - Assess pain using appropriate pain scale  - Administer analgesics based on type and severity of pain and evaluate response  - Implement non-pharmacological measures as appropriate and evaluate response  - Consider cultural and social influences on pain and pain management  - Manage/alleviate anxiety  - Utilize distraction and/or relaxation techniques  - Monitor for opioid side effects  - Notify MD/LIP if interventions unsuccessful or patient reports new pain  - Anticipate increased pain with activity and pre-medicate as appropriate  Outcome: Progressing

## 2023-11-27 NOTE — PHYSICAL THERAPY NOTE
PHYSICAL THERAPY TREATMENT NOTE - INPATIENT     Room Number: 701/065-Y       Presenting Problem:  (acute on chronic CHF)    Problem List  Principal Problem:    Acute on chronic congestive heart failure, unspecified heart failure type ([address]  Active Problems:    Hypoxia    Acute hypoxemic respiratory failure (HCC)      PHYSICAL THERAPY ASSESSMENT   Chart reviewed. RN  approved participation in physical therapy---requesting therapy assess orthostatic BP . RN present toward end of session to help with clinical decision making related to progression of activity with lower BP and lower O2 sats on RA . PPE worn by therapist: mask and gloves. Patient was not wearing a mask during session. Patient presented in bed with 6/10 pain HA and \"moderate chest pain \".   RN aware of pt pain reports. Pt is alert oriented x 3 to questions, able to read clock . Pt is motivated for participation . Pt with significant symptoms of dizziness and vision changes during ambulation activity with significant drop in BP. Patient with  decreased  progress towards goals during this session related to poor BP control /low BP with symptoms. RN providing midodrine med at end of session. Education provided on Physical therapy plan of care, physiological benefits of out of bed mobility, and fall risk prevention , fxn mobility training , reinforced need for staff assisted mobility and HFR related to low BP , B AP and DC Planning  . Discussed [facility] vs return to [facility]. Pt is adamant about return to [facility] declining need for [facility] . Patient with good carryover.  B UE right > left  swelling per pt since last week    Vitals :  supine resting HR 80     O2 sats RA 91%    /56 no symptoms   Sitting  resting HR 80     O2 sats RA 88-93 %   /59    mild dizziness   Standing   HR 80       O2 sats  RA  87-90 %         BP 89/43 dizziness with need to sit down   Post standing  sit at EOB      symptoms improved   87/65     RN contacted and arrived . Discussed pt status and symptoms. RN returning pt to 1L O2 for remainder of session   Approved activity to chair for assessment purpose and pt has been in bed for a few days. After activity to chair     HR 82   O2 sats 92 %    BP  82/55 (65)  moderate dizziness  eye blurry --poor tolerance     Reassessed once in chair  symptoms improved  BP returned to  98/60  HR 81  O2 sats on 1L 95 %  RN provided midodrine med       Bed mobility:  SBA   Transfers:  SBA to [facility]   cues provided     Gait Assistance: Minimum assistance  Distance (ft): 10 ft around bed to chair --pt with signficant symptoms of dizziness and blurred vision with significant drop in BP . RN present for mobility at end of session . Assistive Device: Rolling walker  Pattern: R Steppage;L Steppage;R Foot flat;L Foot flat       Patient was left in bedside chair and per RN no chair alarm needed , LE elevated   at end of session with all needs in reach. The patient's Approx Degree of Impairment: 46.58% has been calculated based on documentation in the [facility] '6 clicks' Inpatient Basic Mobility Short Form. Research supports that patients with this level of impairment may benefit from Home with home health PT. Pt will require 24hr assisted skilled care. Pt with HFR with poor BP control and limited activity tolerance with symptomatic significant drop in BP and Desaturation on RA during change of position and therapy activity  . Pt with presents with generalized weakness , HFR , decrease balance , need for O2 during activity , assisted fxn mobility , and decrease activity tolerance. Pt with goal for return to [facility]. Discussed [facility] vs return to [facility]>   Pt declined going to [facility] as has had unpleasant experiences at rehab in the past.   SW working with pt on optimal DC Plan. RN aware of patient status post session.     DISCHARGE RECOMMENDATIONS  PT Discharge Recommendations: 24 hour care/supervision ([facility] facility  with [facility] services and O2 needs)     PLAN  PT Treatment Plan: Bed mobility; Body mechanics; Endurance; Energy conservation;Patient education; Family education;Gait training;Balance training;Transfer training    SUBJECTIVE    Dizziness and \" blurred vision during activity \"     OBJECTIVE  Precautions: Cardiac (per RN no chair alarm needed)    WEIGHT BEARING RESTRICTION  Weight Bearing Restriction: None                PAIN ASSESSMENT   Rating:  (see note)  Location: chest pain and HA   RN aware of both symptoms  Management Techniques: Activity promotion; Body mechanics;Breathing techniques;Relaxation;Repositioning    BALANCE                                                                                                                       Static Sitting: Good  Dynamic Sitting: Good           Static Standing: Fair -  Dynamic Standing: Fair -    ACTIVITY TOLERANCE      Poor see vitals above                    O2 WALK       AM-PAC '6-Clicks' INPATIENT SHORT FORM - BASIC MOBILITY  How much difficulty does the patient currently have. .. Patient Difficulty: Turning over in bed (including adjusting bedclothes, sheets and blankets)?: None   Patient Difficulty: Sitting down on and standing up from a chair with arms (e.g., wheelchair, bedside commode, etc.): A Little   Patient Difficulty: Moving from lying on back to sitting on the side of the bed?: A Little   How much help from another person does the patient currently need. .. Help from Another: Moving to and from a bed to a chair (including a wheelchair)?: A Little   Help from Another: Need to walk in hospital room?: A Little   Help from Another: Climbing 3-5 steps with a railing?: A Lot     AM-PAC Score:  Raw Score: 18   Approx Degree of Impairment: 46.58%   Standardized Score (AM-PAC Scale): 43.63   CMS Modifier (G-Code): CK      Patient End of Session: Up in chair;Needs met;Call light within reach;RN aware of session/findings; All patient questions and concerns addressed (per RN no chair alarm needed)    CURRENT GOALS   Goals to be met by: 12/4  Patient Goal Patient's self-stated goal is: home   Goal #1 Patient is able to demonstrate supine - sit EOB @ level: supervision      Goal #1   Current Status SBA    Goal #2 Patient is able to demonstrate transfers Sit to/from Stand at assistance level: supervision with walker - rolling      Goal #2  Current Status As above    Goal #3 Patient is able to ambulate 50 feet with assist device: walker - rolling at assistance level: supervision   Goal #3   Current Status As above    Goal #4 Patient will negotiate 3 stairs/one curb w/ assistive device and supervision   Goal #4   Current Status Not tested    Goal #5 Patient to demonstrate independence with home activity/exercise instructions provided to patient in preparation for discharge.    Goal #5   Current Status In progress   Goal #6     Goal #6  Current Status      Therapy activity 2 units

## 2023-11-27 NOTE — PROGRESS NOTES
11/27/23 1500   Clinical Encounter Type   Visited With Patient not available   Referral From [address] [place] [address] Requests During Visit / Hospitalization Declines  Visit   Trigger for Consult   Trigger for Spiritual Care Consult No     Summary:  received referral for visit from [facility]. Writer attempted to visit but patient was busy with staff. Spiritual Care support can be requested via an [facility] consult.     -Gilberto Ocampo.

## 2023-11-28 VITALS
WEIGHT: 196 LBS | BODY MASS INDEX: 29.03 KG/M2 | DIASTOLIC BLOOD PRESSURE: 52 MMHG | HEART RATE: 79 BPM | SYSTOLIC BLOOD PRESSURE: 84 MMHG | RESPIRATION RATE: 16 BRPM | OXYGEN SATURATION: 92 % | TEMPERATURE: 98 F | HEIGHT: 69 IN

## 2023-11-28 PROBLEM — Z71.89 GOALS OF CARE, COUNSELING/DISCUSSION: Status: ACTIVE | Noted: 2023-11-28

## 2023-11-28 PROBLEM — Z71.89 ADVANCE CARE PLANNING: Status: ACTIVE | Noted: 2023-11-28

## 2023-11-28 PROBLEM — Z51.5 PALLIATIVE CARE BY SPECIALIST: Status: ACTIVE | Noted: 2023-11-28

## 2023-11-28 LAB
ANION GAP SERPL CALC-SCNC: 5 MMOL/L (ref 0–18)
BUN BLD-MCNC: 22 MG/DL (ref 9–23)
BUN/CREAT SERPL: 10.9 (ref 10–20)
CALCIUM BLD-MCNC: 8.9 MG/DL (ref 8.7–10.4)
CHLORIDE SERPL-SCNC: 98 MMOL/L (ref 98–112)
CO2 SERPL-SCNC: 34 MMOL/L (ref 21–32)
CREAT BLD-MCNC: 2.01 MG/DL
DEPRECATED RDW RBC AUTO: 47.7 FL (ref 35.1–46.3)
EGFRCR SERPLBLD CKD-EPI 2021: 34 ML/MIN/1.73M2 (ref 60–?)
ERYTHROCYTE [DISTWIDTH] IN BLOOD BY AUTOMATED COUNT: 14.5 % (ref 11–15)
GLUCOSE BLD-MCNC: 97 MG/DL (ref 70–99)
GLUCOSE BLDC GLUCOMTR-MCNC: 104 MG/DL (ref 70–99)
GLUCOSE BLDC GLUCOMTR-MCNC: 112 MG/DL (ref 70–99)
HCT VFR BLD AUTO: 26.7 %
HGB BLD-MCNC: 8.6 G/DL
INR BLD: 1.3 (ref 0.8–1.2)
INR BLD: 1.33 (ref 0.8–1.2)
MAGNESIUM SERPL-MCNC: 1.9 MG/DL (ref 1.6–2.6)
MCH RBC QN AUTO: 29 PG (ref 26–34)
MCHC RBC AUTO-ENTMCNC: 32.2 G/DL (ref 31–37)
MCV RBC AUTO: 89.9 FL
OSMOLALITY SERPL CALC.SUM OF ELEC: 287 MOSM/KG (ref 275–295)
PLATELET # BLD AUTO: 192 10(3)UL (ref 150–450)
POTASSIUM SERPL-SCNC: 3.6 MMOL/L (ref 3.5–5.1)
PROTHROMBIN TIME: 17 SECONDS (ref 11.6–14.8)
PROTHROMBIN TIME: 17.3 SECONDS (ref 11.6–14.8)
RBC # BLD AUTO: 2.97 X10(6)UL
SODIUM SERPL-SCNC: 137 MMOL/L (ref 136–145)
WBC # BLD AUTO: 6.3 X10(3) UL (ref 4–11)

## 2023-11-28 PROCEDURE — 99232 SBSQ HOSP IP/OBS MODERATE 35: CPT | Performed by: INTERNAL MEDICINE

## 2023-11-28 PROCEDURE — 99239 HOSP IP/OBS DSCHRG MGMT >30: CPT | Performed by: HOSPITALIST

## 2023-11-28 PROCEDURE — 99222 1ST HOSP IP/OBS MODERATE 55: CPT | Performed by: REGISTERED NURSE

## 2023-11-28 RX ORDER — MIDODRINE HYDROCHLORIDE 10 MG/1
10 TABLET ORAL 3 TIMES DAILY
Qty: 60 TABLET | Refills: 0 | Status: SHIPPED | OUTPATIENT
Start: 2023-11-28

## 2023-11-28 RX ORDER — BUMETANIDE 1 MG/1
0.5 TABLET ORAL DAILY
Qty: 30 TABLET | Refills: 0 | Status: SHIPPED | OUTPATIENT
Start: 2023-11-28

## 2023-11-28 NOTE — DISCHARGE SUMMARY
[facility] HOSP - [place]    Discharge Summary    [address] [facility]  Patient Status:  Inpatient    1945 MRN [id number]   Location [facility] Attending Keysha Alegria MD   Hosp Day # 12 PCP None Pcp     Date of Admission: 2023   Date of Discharge: 2023    Hospital Discharge Diagnoses: Acute hypoxic respiratory failure     Lace+ Score: 76  59-90 High Risk  29-58 Medium Risk  0-28   Low Risk. TCM Follow-Up Recommendation:  LACE > 58: High Risk of readmission after discharge from the hospital.        Admitting Diagnosis: Hypoxia [R09.02]  Acute on chronic congestive heart failure, unspecified heart failure type ([facility] [address] [I50.9]    Disposition: Home    Discharge Diagnosis: . Principal Problem:    Acute on chronic congestive heart failure, unspecified heart failure type ([facility] [address]  Active Problems:    Hypoxia    Acute hypoxemic respiratory failure [facility])      Hospital Course:   Reason for Admission:   Per Dr. Kofi Shane    The patient is a 68-year-old  male who lives at [facility], brought in today for evaluation of progressive shortness of breath and hypoxemia. He does use oxygen only at night, but he has been required oxygen all day long for last 2 to 3 days. Upon arrival to the emergency room, his pulse ox was 88% on room air, 4L nasal cannula 97%. Chemistry was unremarkable. GFR is 35 at baseline. Bicarbonate 35, potassium 3.3, BUN and creatinine of 17 and 1.96. Troponin was negative. ProBNP 453. GeneXpert viral panel was negative. INR supratherapeutic at 6.15. Chest x-ray showed cardiomegaly with possible left retrocardiac opacity. Procalcitonin still pending. He was started on IV Lasix, and he will be admitted to the hospital for further management. Discharge Physical Exam:   Physical Exam:    General: No acute distress. Respiratory: Clear to auscultation bilaterally. No wheezes. No rhonchi. Cardiovascular: S1, S2. Regular rate and rhythm.  No murmurs, rubs or gallops. Abdomen: Soft, nontender, nondistended. Positive bowel sounds. No rebound or guarding. Neurologic: No focal neurological deficits. Musculoskeletal: Moves all extremities. Hospital Course:   Acute on Chronic hypoxic respiratory failure   RLL ground glass opacities  Mucous plug L mainstem bronchus   Asp PNA   - supplemental o2 and wean as able. - Duonebs   - Pulmonary on consult.   - CT chest reviewed. - SLP eval regular diet. - IV zosyn to discontinue   - cx grew Candida-no treatment per Pulm   - Mucomyst inhalers. - CXR increasing opacity  - underwent bronchoscopy on 11/22- showed extensive mucus plugging in left mainstem bronchus  - on 2liters chronically      Orthostatic hypotension  -IV fluids- heplock now  - montor overnight  - PT/OT eval - home tomorrow with 24 hour care   - midodrine 10 mg PO TID scheduled-script printed      Chest Pain   Acute on chronic HFpEF   - EKG Paced  - stop lisinopril and BB due to hypotension  - continue bumex   - Lipitor 80mg daily   - cards on consult. Chest pain  - reasssurred patient that chest pain is not cardiac in nature  - it will take time for it to resolve  - discussed with Cards and Pulm  - home on norco for pain      COPD   Asp PNA  - wears 2L NS nocturnal at home   - no wheezing at this time. - stop IV zosyn   - CT chest and CXR reviewed. - Pulmonary on consult. - cont home meds. - no antibiotics at discharge   - Flutter valve. CAD s/p CABG and PCI  HFpEF LVEF 50% BiV pacing   H/o chronic pericardial effusion w/o hemodynamic compromise   - Cont home aspirin  - stop plavix   - Metoprolol stopped due to hypotension   - Lisinopril  - cards on consult. - EKG Paced. Trop neg.   - Angiogram results reviewed   - Trop normal now.    - Lidoderm patch      Persistent A.fib   - continue on coumadin  - INR ok  - inr daily   - metoprolol  - stop plavix to avoid triple therapy   -resume coumadin   - inr check daily      DM II   - Hba1c 7.7%   - resume Levemir 10 units at bedtime.   - ISS      H/o VTE  - h/o RUE and LLE DVT x 2 in past   - INR 2.4  - per EMR pt had DVT while on therapeutic Eliquis so switched to coumadin      Other medical problems  Hypothyroidism  CKD III  H/o pericardial effusion   Parkinson's disease. Orthostatic Hypotension      MDM: High        Quality:  DVT Prophylaxis: coumadin   CODE status: DNR/Select   Dispo: per clinical course     I have reviewed the oxygen testing performed on Og. Luis Adan will need home oxygen continuously via nasal cannula. His condition is expected to improve with oxygen therapy at home. Luis Adan will need portable oxygen because he is mobile in the home. Complications: none       Complications: none    Consultants         Provider   Role Specialty     Francia Magallon MD  Consulting Physician CARDIOLOGY     Jaspreet Merchant DO  Consulting Physician PULMONARY DISEASES     Yesi Munoz MD  Consulting Physician PULMONARY DISEASES          Surgical Procedures       Case IDs Date Procedure Surgeon Location Status    [id number] 11/22/23 BRONCHOSCOPY Jaspreet Merchant  [facility] ENDOSCOPY Comp          Pending Labs       Order Current Status    AFB Culture and Smear In process    AFB Culture(P) In process    Aspergillus Galactomannan Antigen Detection, Bronchoalveolar Lavage or Serum In process    Pneumocystis by PCR In process    FUNGUS CULTURE(P) Preliminary result    Fungus Culture and Stain Preliminary result            Discharge Plan:   Discharge Condition: Stable    Current Discharge Medication List        New Orders    Details   aspirin 81 MG Oral Chew Tab Chew 1 tablet (81 mg total) by mouth daily. HYDROcodone-acetaminophen 5-325 MG Oral Tab Take 1 tablet by mouth every 6 (six) hours as needed. nitroglycerin 0.4 MG Sublingual SL Tab Place 1 tablet (0.4 mg total) under the tongue every 6 (six) hours as needed for Chest pain.            Home Meds - Modified    Details insulin glargine 100 UNIT/ML Subcutaneous Solution Inject 8 Units into the skin nightly. Home Meds - Unchanged    Details   levothyroxine 75 MCG Oral Tab Take 1 tablet (75 mcg total) by mouth before breakfast.      fluticasone propionate 50 MCG/ACT Nasal Suspension 2 sprays by Each Nare route daily. lisinopril 2.5 MG Oral Tab Take 1 tablet (2.5 mg total) by mouth daily. fluticasone-salmeterol 250-50 MCG/ACT Inhalation Aerosol Powder, Breath Activated Inhale 1 puff into the lungs every 12 (twelve) hours. bumetanide 0.5 MG Oral Tab Take 1 tablet (0.5 mg total) by mouth daily. pantoprazole 40 MG Oral Tab EC Take 1 tablet (40 mg total) by mouth every morning before breakfast.      tiotropium 18 MCG Inhalation Cap 1 capsule (18 mcg total) daily. sucralfate 1 g Oral Tab Take 1 tablet (1 g total) by mouth 4 (four) times daily before meals and nightly. warfarin 5 MG Oral Tab Take 1 tablet (5 mg total) by mouth daily. pregabalin 150 MG Oral Cap Take 1 capsule (150 mg total) by mouth in the morning and 1 capsule (150 mg total) at noon and 1 capsule (150 mg total) in the evening. finasteride 5 MG Oral Tab Take 1 tablet (5 mg total) by mouth daily. Metoprolol Succinate ER 25 MG Oral Tablet 24 Hr Take 0.5 tablets (12.5 mg total) by mouth Daily Beta Blocker. Hold for SBP<90      ferrous sulfate 325 (65 FE) MG Oral Tab EC Take 1 tablet (325 mg total) by mouth daily with breakfast.      Montelukast Sodium 10 MG Oral Tab Take 1 tablet (10 mg total) by mouth nightly. insulin detemir 100 UNIT/ML Subcutaneous Solution Inject into the skin 3 (three) times daily before meals. 1-3 units subcutaneous before meals PRN      Melatonin 5 MG Oral Tab Take 1 tablet (5 mg total) by mouth nightly. atorvastatin 80 MG Oral Tab Take 1 tablet (80 mg total) by mouth nightly.       Ipratropium-Albuterol (COMBIVENT RESPIMAT)  MCG/ACT Inhalation Aero Soln Inhale 1 puff into the lungs 4 (four) times daily. docusate sodium (DOK) 100 MG Oral Cap Take 1 capsule (100 mg total) by mouth 2 (two) times daily. carbidopa-levodopa  MG Oral Tab Take 2 tablets by mouth 4 (four) times daily. cyclobenzaprine 5 MG Oral Tab Take 1 tablet (5 mg total) by mouth 3 (three) times daily as needed (Pain). lidocaine-menthol 4-1 % External Patch Place 1 patch onto the skin daily. Midodrine HCl 10 MG Oral Tab Take 1 tablet (10 mg total) by mouth 3 (three) times daily as needed (to give if SBP is less than 100). magnesium hydroxide 400 MG/5ML Oral Suspension Take 30 mL by mouth daily as needed (upset stomach). Discharge Diet: Cardiac diet     Discharge Activity: As tolerated       Discharge Medications        START taking these medications        Instructions Prescription details   aspirin 81 MG Chew      Chew 1 tablet (81 mg total) by mouth daily. Quantity: 30 tablet  Refills: 0     HYDROcodone-acetaminophen 5-325 MG Tabs  Commonly known as: Norco      Take 1 tablet by mouth every 6 (six) hours as needed. Quantity: 28 tablet  Refills: 0            CHANGE how you take these medications        Instructions Prescription details   bumetanide 1 MG Tabs  Commonly known as: Bumex  What changed: medication strength      Take 0.5 tablets (0.5 mg total) by mouth daily. Quantity: 30 tablet  Refills: 0     insulin glargine 100 UNIT/ML Soln  Commonly known as: Lantus  What changed: how much to take      Inject 8 Units into the skin nightly. Quantity: 1 each  Refills: 0     midodrine 10 MG Tabs  Commonly known as: ProAmatine  What changed:   when to take this  reasons to take this      Take 1 tablet (10 mg total) by mouth in the morning and 1 tablet (10 mg total) at noon and 1 tablet (10 mg total) in the evening.    Quantity: 60 tablet  Refills: 0            CONTINUE taking these medications        Instructions Prescription details   atorvastatin 80 MG Tabs  Commonly known as: Lipitor      Take 1 tablet (80 mg total) by mouth nightly. Refills: 0     carbidopa-levodopa  MG Tabs  Commonly known as: SINEMET      Take 2 tablets by mouth 4 (four) times daily. Refills: 0     Combivent Respimat  MCG/ACT Aers  Generic drug: ipratropium-albuterol      Inhale 1 puff into the lungs 4 (four) times daily. Refills: 0     cyclobenzaprine 5 MG Tabs  Commonly known as: Flexeril      Take 1 tablet (5 mg total) by mouth 3 (three) times daily as needed (Pain). Refills: 0      MG Caps  Generic drug: docusate sodium      Take 1 capsule (100 mg total) by mouth 2 (two) times daily. Refills: 0     ferrous sulfate 325 (65 FE) MG Tbec      Take 1 tablet (325 mg total) by mouth daily with breakfast.   Refills: 0     finasteride 5 MG Tabs  Commonly known as: Proscar      Take 1 tablet (5 mg total) by mouth daily. Refills: 0     fluticasone propionate 50 MCG/ACT Susp  Commonly known as: Flonase      2 sprays by Each Nare route daily. Refills: 0     fluticasone-salmeterol 250-50 MCG/ACT Aepb  Commonly known as: Advair Diskus      Inhale 1 puff into the lungs every 12 (twelve) hours. Refills: 0     insulin detemir 100 UNIT/ML Soln  Commonly known as: Levemir      Inject into the skin 3 (three) times daily before meals. 1-3 units subcutaneous before meals PRN   Refills: 0     levothyroxine 75 MCG Tabs  Commonly known as: Synthroid      Take 1 tablet (75 mcg total) by mouth before breakfast.   Refills: 0     lidocaine-menthol 4-1 % Ptch      Place 1 patch onto the skin daily. Refills: 0     magnesium hydroxide 400 MG/5ML Susp  Commonly known as: Milk of Magnesia      Take 30 mL by mouth daily as needed (upset stomach). Refills: 0     Melatonin 5 MG Tabs      Take 1 tablet (5 mg total) by mouth nightly. Refills: 0     montelukast 10 MG Tabs  Commonly known as: Singulair      Take 1 tablet (10 mg total) by mouth nightly.    Refills: 0     pantoprazole 40 MG Tbec  Commonly known as: Protonix      Take 1 tablet (40 mg total) by mouth every morning before breakfast.   Refills: 0     pregabalin 150 MG Caps  Commonly known as: LYRICA      Take 1 capsule (150 mg total) by mouth in the morning and 1 capsule (150 mg total) at noon and 1 capsule (150 mg total) in the evening. Refills: 0     sucralfate 1 g Tabs  Commonly known as: Carafate      Take 1 tablet (1 g total) by mouth 4 (four) times daily before meals and nightly. Refills: 0     tiotropium 18 MCG Caps  Commonly known as: Spiriva Handihaler      1 capsule (18 mcg total) daily. Refills: 0     warfarin 5 MG Tabs  Commonly known as: Coumadin      Take 1 tablet (5 mg total) by mouth daily. Refills: 0            STOP taking these medications      acetaminophen 160 MG/5ML Liqd  Commonly known as: Tylenol        acetaminophen 325 MG Tabs  Commonly known as: Tylenol        amiodarone 200 MG Tabs  Commonly known as: Pacerone        clopidogrel 75 MG Tabs  Commonly known as: Plavix        lisinopril 2.5 MG Tabs  Commonly known as: Prinivil; Zestril        metoclopramide 5 MG Tabs  Commonly known as: Reglan        metOLazone 2.5 MG Tabs  Commonly known as: Zaroxolyn        metoprolol succinate ER 25 MG Tb24  Commonly known as: Toprol XL        Siltussin  MG/5ML Syrp  Generic drug: guaiFENesin                  Where to Get Your Medications        Please  your prescriptions at the location directed by your doctor or nurse    Bring a paper prescription for each of these medications  aspirin 81 MG Chew  bumetanide 1 MG Tabs  HYDROcodone-acetaminophen 5-325 MG Tabs  insulin glargine 100 UNIT/ML Soln  midodrine 10 MG Tabs         Follow up: Follow-up Information       Pcp, None Follow up in 1 week(s).     Contact information:  [place] [id number]                             Follow up Labs and imaging:         Time spent:  > 30 minutes    Yeimy Davidson MD  11/26/2023

## 2023-11-28 NOTE — PROGRESS NOTES
11/28/23 1146   Mobility   SPO2% on Room Air at Rest 91   SPO2% on Oxygen at Rest 97   At rest oxygen flow (liters per minute) 2   SPO2% Ambulation on Room Air 88   SPO2% Ambulation on Oxygen 98   Ambulation oxygen flow (liters per minute) 2

## 2023-11-28 NOTE — CM/SW NOTE
11/28/23 1300   Discharge disposition   Expected discharge disposition Home or Self   DME/Infusion Providers [facility]  (Home O2)   Discharge transportation [address]     Pt discussed during nursing rounds. Pt is stable for dc today. MD dc order entered. Pt continues to decline HH despite recommendation. Pt states that he already receives in-house therapy services at [facility]. Pt now on 2L O2 which is a new baseline for him as usually only used O2 at night per patient. Pt is current w/HME for home O2. Liaison Rosa Patel will meet w/patient in room to confirm his current services/needs at [facility]. CM requested that Rosa Patel determine if patient will need additional O2 tank at [facility] given the baseline of his current home equipment. Pt requesting assist w/transport home. Pt now requires ambulance transport due to O2 needs. [facility] scheduled for 4pm . 1425: DEANNA received for CPC at [facility]. [address] referral sent in [address] [facility] [address]. CM left message for DON at [facility] to confirm if there is a preferred [address] provider for their community. List of accepting providers will be needed for choice if there is not a preferred [address] provider at [facility]. 1610: [address] cancelled for 4pm  due new chest pain per RN. RN then requested [facility] be rescheduled ASAP.  scheduled for 4:35pm. [address] [facility] is only accepting provider for [address] at time of dc. [facility] [address] and notified of dc today. Plan: Markie Self w/HME for home O2 today and [address] [facility] for [address] today. / to remain available for support and/or discharge planning.      SPENCER GraceN    [phone]

## 2023-11-28 NOTE — PLAN OF CARE
Pt alert and oriented x4. Pt on 2L of oxygen. Pt hypotensive during shift. Scheduled and prn midodrine given. Pt having 8/10 chest pain prior to dc; MD aware. Flexeril and norco given for pain. Dr. Alexa Alvarez aware. No further orders/cardiac work up per MD. Dr. Alexa Alvarez okay with dc. Primary RN called Brien du for report. Primary Rn educated patient on medications and orthostatic hypotension. Problem: Patient Centered Care  Goal: Patient preferences are identified and integrated in the patient's plan of care  Description: Interventions:  - What would you like us to know as we care for you?  [facility]   - Provide timely, complete, and accurate information to patient/family  - Incorporate patient and family knowledge, values, beliefs, and cultural backgrounds into the planning and delivery of care  - Encourage patient/family to participate in care and decision-making at the level they choose  - Honor patient and family perspectives and choices  11/28/2023 1330 by Kd Alexandra  Outcome: Completed  11/28/2023 0955 by Kd Alexandra  Outcome: Progressing     Problem: Diabetes/Glucose Control  Goal: Glucose maintained within prescribed range  Description: INTERVENTIONS:  - Monitor Blood Glucose as ordered  - Assess for signs and symptoms of hyperglycemia and hypoglycemia  - Administer ordered medications to maintain glucose within target range  - Assess barriers to adequate nutritional intake and initiate nutrition consult as needed  - Instruct patient on self management of diabetes  11/28/2023 1330 by Kd Alexandra  Outcome: Completed  11/28/2023 0955 by Kd Alexandra  Outcome: Progressing     Problem: Patient/Family Goals  Goal: Patient/Family Long Term Goal  Description: Patient's Long Term Goal: Discharge to [facility]    Interventions:  - Monitor vs, assess sob, O2 per protocol  - See additional Care Plan goals for specific interventions  11/28/2023 1330 by Kd Alexandra  Outcome: Completed  11/28/2023 0955 by Nando Crocker  Outcome: Progressing  Goal: Patient/Family Short Term Goal  Description: Patient's Short Term Goal: Manage SOB    Interventions:   - assess lung sounds, dw, I&O, FR  - See additional Care Plan goals for specific interventions  11/28/2023 1330 by Nando Crocker  Outcome: Completed  11/28/2023 0955 by Nando Crocker  Outcome: Progressing     Problem: CARDIOVASCULAR - ADULT  Goal: Maintains optimal cardiac output and hemodynamic stability  Description: INTERVENTIONS:  - Monitor vital signs, rhythm, and trends  - Monitor for bleeding, hypotension and signs of decreased cardiac output  - Evaluate effectiveness of vasoactive medications to optimize hemodynamic stability  - Monitor arterial and/or venous puncture sites for bleeding and/or hematoma  - Assess quality of pulses, skin color and temperature  - Assess for signs of decreased coronary artery perfusion - ex.  Angina  - Evaluate fluid balance, assess for edema, trend weights  11/28/2023 1330 by Nando Crocker  Outcome: Completed  11/28/2023 0955 by Nando Crocker  Outcome: Progressing  Goal: Absence of cardiac arrhythmias or at baseline  Description: INTERVENTIONS:  - Continuous cardiac monitoring, monitor vital signs, obtain 12 lead EKG if indicated  - Evaluate effectiveness of antiarrhythmic and heart rate control medications as ordered  - Initiate emergency measures for life threatening arrhythmias  - Monitor electrolytes and administer replacement therapy as ordered  11/28/2023 1330 by Nando Crocker  Outcome: Completed  11/28/2023 0955 by Nando Crocker  Outcome: Progressing     Problem: RESPIRATORY - ADULT  Goal: Achieves optimal ventilation and oxygenation  Description: INTERVENTIONS:  - Assess for changes in respiratory status  - Assess for changes in mentation and behavior  - Position to facilitate oxygenation and minimize respiratory effort  - Oxygen supplementation based on oxygen saturation or ABGs  - Provide Smoking Cessation handout, if applicable  - Encourage broncho-pulmonary hygiene including cough, deep breathe, Incentive Spirometry  - Assess the need for suctioning and perform as needed  - Assess and instruct to report SOB or any respiratory difficulty  - Respiratory Therapy support as indicated  - Manage/alleviate anxiety  - Monitor for signs/symptoms of CO2 retention  11/28/2023 1330 by Alee Piper  Outcome: Completed  11/28/2023 0955 by Alee Piper  Outcome: Progressing     Problem: METABOLIC/FLUID AND ELECTROLYTES - ADULT  Goal: Glucose maintained within prescribed range  Description: INTERVENTIONS:  - Monitor Blood Glucose as ordered  - Assess for signs and symptoms of hyperglycemia and hypoglycemia  - Administer ordered medications to maintain glucose within target range  - Assess barriers to adequate nutritional intake and initiate nutrition consult as needed  - Instruct patient on self management of diabetes  11/28/2023 1330 by Alee Piper  Outcome: Completed  11/28/2023 0955 by Alee Piper  Outcome: Progressing  Goal: Electrolytes maintained within normal limits  Description: INTERVENTIONS:  - Monitor labs and rhythm and assess patient for signs and symptoms of electrolyte imbalances  - Administer electrolyte replacement as ordered  - Monitor response to electrolyte replacements, including rhythm and repeat lab results as appropriate  - Fluid restriction as ordered  - Instruct patient on fluid and nutrition restrictions as appropriate  11/28/2023 1330 by Alee Piper  Outcome: Completed  11/28/2023 0955 by Alee Piper  Outcome: Progressing     Problem: HEMATOLOGIC - ADULT  Goal: Free from bleeding injury  Description: (Example usage: patient with low platelets)  INTERVENTIONS:  - Avoid intramuscular injections, enemas and rectal medication administration  - Ensure safe mobilization of patient  - Hold pressure on venipuncture sites to achieve adequate hemostasis  - Assess for signs and symptoms of internal bleeding  - Monitor lab trends  - Patient is to report abnormal signs of bleeding to staff  - Avoid use of toothpicks and dental floss  - Use electric shaver for shaving  - Use soft bristle tooth brush  - Limit straining and forceful nose blowing  11/28/2023 1330 by Samaria Noyola  Outcome: Completed  11/28/2023 0955 by Samaria Noyola  Outcome: Progressing     Problem: PAIN - ADULT  Goal: Verbalizes/displays adequate comfort level or patient's stated pain goal  Description: INTERVENTIONS:  - Encourage pt to monitor pain and request assistance  - Assess pain using appropriate pain scale  - Administer analgesics based on type and severity of pain and evaluate response  - Implement non-pharmacological measures as appropriate and evaluate response  - Consider cultural and social influences on pain and pain management  - Manage/alleviate anxiety  - Utilize distraction and/or relaxation techniques  - Monitor for opioid side effects  - Notify MD/LIP if interventions unsuccessful or patient reports new pain  - Anticipate increased pain with activity and pre-medicate as appropriate  11/28/2023 1330 by Samaria Noyola  Outcome: Completed  11/28/2023 0955 by Samaria Noyola  Outcome: Progressing

## 2023-11-28 NOTE — PLAN OF CARE
Problem: Patient Centered Care  Goal: Patient preferences are identified and integrated in the patient's plan of care  Description: Interventions:  - What would you like us to know as we care for you?  From [facility]   - Provide timely, complete, and accurate information to patient/family  - Incorporate patient and family knowledge, values, beliefs, and cultural backgrounds into the planning and delivery of care  - Encourage patient/family to participate in care and decision-making at the level they choose  - Honor patient and family perspectives and choices  Outcome: Progressing     Problem: Diabetes/Glucose Control  Goal: Glucose maintained within prescribed range  Description: INTERVENTIONS:  - Monitor Blood Glucose as ordered  - Assess for signs and symptoms of hyperglycemia and hypoglycemia  - Administer ordered medications to maintain glucose within target range  - Assess barriers to adequate nutritional intake and initiate nutrition consult as needed  - Instruct patient on self management of diabetes  Outcome: Progressing     Problem: Patient/Family Goals  Goal: Patient/Family Long Term Goal  Description: Patient's Long Term Goal: Discharge to [facility]    Interventions:  - Monitor vs, assess sob, O2 per protocol  - See additional Care Plan goals for specific interventions  Outcome: Progressing  Goal: Patient/Family Short Term Goal  Description: Patient's Short Term Goal: Manage SOB    Interventions:   - assess lung sounds, dw, I&O, FR  - See additional Care Plan goals for specific interventions  Outcome: Progressing     Problem: CARDIOVASCULAR - ADULT  Goal: Maintains optimal cardiac output and hemodynamic stability  Description: INTERVENTIONS:  - Monitor vital signs, rhythm, and trends  - Monitor for bleeding, hypotension and signs of decreased cardiac output  - Evaluate effectiveness of vasoactive medications to optimize hemodynamic stability  - Monitor arterial and/or venous puncture sites for bleeding and/or hematoma  - Assess quality of pulses, skin color and temperature  - Assess for signs of decreased coronary artery perfusion - ex.  Angina  - Evaluate fluid balance, assess for edema, trend weights  Outcome: Progressing  Goal: Absence of cardiac arrhythmias or at baseline  Description: INTERVENTIONS:  - Continuous cardiac monitoring, monitor vital signs, obtain 12 lead EKG if indicated  - Evaluate effectiveness of antiarrhythmic and heart rate control medications as ordered  - Initiate emergency measures for life threatening arrhythmias  - Monitor electrolytes and administer replacement therapy as ordered  Outcome: Progressing     Problem: RESPIRATORY - ADULT  Goal: Achieves optimal ventilation and oxygenation  Description: INTERVENTIONS:  - Assess for changes in respiratory status  - Assess for changes in mentation and behavior  - Position to facilitate oxygenation and minimize respiratory effort  - Oxygen supplementation based on oxygen saturation or ABGs  - Provide Smoking Cessation handout, if applicable  - Encourage broncho-pulmonary hygiene including cough, deep breathe, Incentive Spirometry  - Assess the need for suctioning and perform as needed  - Assess and instruct to report SOB or any respiratory difficulty  - Respiratory Therapy support as indicated  - Manage/alleviate anxiety  - Monitor for signs/symptoms of CO2 retention  Outcome: Progressing     Problem: METABOLIC/FLUID AND ELECTROLYTES - ADULT  Goal: Glucose maintained within prescribed range  Description: INTERVENTIONS:  - Monitor Blood Glucose as ordered  - Assess for signs and symptoms of hyperglycemia and hypoglycemia  - Administer ordered medications to maintain glucose within target range  - Assess barriers to adequate nutritional intake and initiate nutrition consult as needed  - Instruct patient on self management of diabetes  Outcome: Progressing  Goal: Electrolytes maintained within normal limits  Description: INTERVENTIONS:  - Monitor labs and rhythm and assess patient for signs and symptoms of electrolyte imbalances  - Administer electrolyte replacement as ordered  - Monitor response to electrolyte replacements, including rhythm and repeat lab results as appropriate  - Fluid restriction as ordered  - Instruct patient on fluid and nutrition restrictions as appropriate  Outcome: Progressing     Problem: HEMATOLOGIC - ADULT  Goal: Free from bleeding injury  Description: (Example usage: patient with low platelets)  INTERVENTIONS:  - Avoid intramuscular injections, enemas and rectal medication administration  - Ensure safe mobilization of patient  - Hold pressure on venipuncture sites to achieve adequate hemostasis  - Assess for signs and symptoms of internal bleeding  - Monitor lab trends  - Patient is to report abnormal signs of bleeding to staff  - Avoid use of toothpicks and dental floss  - Use electric shaver for shaving  - Use soft bristle tooth brush  - Limit straining and forceful nose blowing  Outcome: Progressing     Problem: PAIN - ADULT  Goal: Verbalizes/displays adequate comfort level or patient's stated pain goal  Description: INTERVENTIONS:  - Encourage pt to monitor pain and request assistance  - Assess pain using appropriate pain scale  - Administer analgesics based on type and severity of pain and evaluate response  - Implement non-pharmacological measures as appropriate and evaluate response  - Consider cultural and social influences on pain and pain management  - Manage/alleviate anxiety  - Utilize distraction and/or relaxation techniques  - Monitor for opioid side effects  - Notify MD/LIP if interventions unsuccessful or patient reports new pain  - Anticipate increased pain with activity and pre-medicate as appropriate  Outcome: Progressing

## 2023-11-29 ENCOUNTER — PATIENT OUTREACH (OUTPATIENT)
Dept: CASE MANAGEMENT | Age: 78
End: 2023-11-29

## 2023-11-29 NOTE — PROGRESS NOTES
TCM chart review. No TCM as patient follows with outside [facility] PCP. Encounter closing.

## 2023-11-30 LAB
PNEUMOCYSTIS RESULT: NEGATIVE
PNEUMOCYSTIS RESULT: NEGATIVE

## 2023-12-02 LAB — ASPERGILLUS AG BAL/SERUM: 1.97 INDEX

## (undated) DEVICE — TRAP MCS 40ML 5IN PLS SCR CAP

## (undated) DEVICE — CONMED SCOPE SAVER BITE BLOCK, 20X27 MM: Brand: SCOPE SAVER

## (undated) DEVICE — YANKAUER SUCTION INSTRUMENT NO CONTROL VENT, BULB TIP, CLEAR: Brand: YANKAUER

## (undated) DEVICE — SINGLE USE BIOPSY VALVE MAJ-210: Brand: SINGLE USE BIOPSY VALVE (STERILE)

## (undated) DEVICE — AIRLIFE™ MISTY FAST™ SMALL VOLUME NEBULIZER WITH 7 FOOT (2.1 M) CRUSH RESISTANT OXYGEN TUBING, BAFFLED MOUTHPIECE, AND 6 INCH (15 CM) FLEXTUBE: Brand: AIRLIFE™

## (undated) DEVICE — ADAPTER SWIVEL CHRISTMAS TREE 95-911-00

## (undated) DEVICE — SYRINGE MED 10ML SLIP TIP CLR BRL TAPR PLUNG

## (undated) DEVICE — SYRINGE LUER LOCK TIP 3CC

## (undated) DEVICE — Device: Brand: DUAL NARE NASAL CANNULAE FEMALE LUER CON 7FT O2 TUBE

## (undated) DEVICE — KIT CLEAN ENDOKIT 1.1OZ GOWNX2

## (undated) DEVICE — MEDI-VAC NON-CONDUCTIVE SUCTION TUBING: Brand: CARDINAL HEALTH

## (undated) DEVICE — SINGLE USE SUCTION VALVE MAJ-209: Brand: SINGLE USE SUCTION VALVE (STERILE)

## (undated) DEVICE — CONTAINER SPEC CLIKSEAL 4OZ

## (undated) DEVICE — STERILE LATEX POWDER-FREE SURGICAL GLOVESWITH NITRILE COATING: Brand: PROTEXIS

## (undated) NOTE — LETTER
[address], [place]  Authorization for Invasive Procedures  1.  I hereby authorize Dr. Jeronimo Mccoy , my physician and whomever may be designated as the doctor's assistant, to perform the following operation and/or procedure:  Cardiac c products.  Further, I understand that despite careful testing and screening of blood and blood products, I may still be subject to ill effects as a result of recieving a blood transfusion an/or blood producst. The following are some, but not all, of the pot OPERATION and/or OTHER PROCEDURE. 11. I acknowledge that my physician has explained sedation/analgesia administration to me including the risks and benefits.  I consent to the administration of sedation/analgesia as may be necessary or desirable in the

## (undated) NOTE — IP AVS SNAPSHOT
[facility]            (For Outpatient Use Only) Initial Admit Date: 6/26/2020   Inpt/Obs Admit Date: Inpt: 6/26/20 / Obs: N/A   Discharge Date:    Javier Rock:  [de-identified]   MRN: [de-identified]   CSN: [id number]   CEID: [id number]        [facility] TERTIARY INSURANCE   Payor:  Plan:    Group Number:  Insurance Type:    Subscriber Name:  Subscriber :    Subscriber ID:  Pt Rel to Subscriber:    Hospital Account Financial Class: Medicare Advantage    July 3, 2020

## (undated) NOTE — LETTER
[address] [facility] [place]  Authorization for Invasive Procedures  Date: 11/19/2314           Time: 0      I hereby authorize Dr Clementina Cooley, my physician and his/her assistants (if applicable), which may include medical students, residents, and/or fellows, to perform the following surgical operation/ procedure and administer such anesthesia as may be determined necessary by my physician:  Operation/Procedure name (s)  Cardiac Catheterization, Left Ventricular Cineangiography, Bilateral Selective Coronary Angiography and/or Right Heart Catheterization; possible Percutaneous Transluminal Coronary Angioplasty, Coronary Atherectomy, Coronary Stent, Intracoronary Thrombolytic therapy, Antiplatelet therapy and/or Intravascular Ultrasound on [facility]   2. I recognize that during the surgical operation/procedure, unforeseen conditions may necessitate additional or different procedures than those listed above. I, therefore, further authorize and request that the above-named surgeon, assistants, or designees perform such procedures as are, in their judgment, necessary and desirable. 3.   My surgeon/physician has discussed prior to my surgery the potential benefits, risks and side effects of this procedure; the likelihood of achieving goals; and potential problems that might occur during recuperation. They also discussed reasonable alternatives to the procedure, including risks, benefits, and side effects related to the alternatives and risks related to not receiving this procedure. I have had all my questions answered and I acknowledge that no guarantee has been made as to the result that may be obtained. 4.   Should the need arise during my operation/procedure, which includes change of level of care prior to discharge, I also consent to the administration of blood and/or blood products.   Further, I understand that despite careful testing and screening of blood or blood products by collecting agencies, I may still be subject to ill effects as a result of receiving a blood transfusion and/or blood products. The following are some, but not all, of the potential risks that can occur: fever and allergic reactions, hemolytic reactions, transmission of diseases such as Hepatitis, AIDS and Cytomegalovirus (CMV) and fluid overload. In the event that I wish to have an autologous transfusion of my own blood, or a directed donor transfusion, I will discuss this with my physician. Check only if Refusing Blood or Blood Products  I understand refusal of blood or blood products as deemed necessary by my physician may have serious consequences to my condition to include possible death. I hereby assume responsibility for my refusal and release the hospital, its personnel, and my physicians from any responsibility for the consequences of my refusal.          o  Refuse      5. I authorize the use of any specimen, organs, tissues, body parts or foreign objects that may be removed from my body during the operation/procedure for diagnosis, research or teaching purposes and their subsequent disposal by hospital authorities. I also authorize the release of specimen test results and/or written reports to my treating physician on the hospital medical staff or other referring or consulting physicians involved in my care, at the discretion of the Pathologist or my treating physician. 6.   I consent to the photographing or videotaping of the operations or procedures to be performed, including appropriate portions of my body for medical, scientific, or educational purposes, provided my identity is not revealed by the pictures or by descriptive texts accompanying them. If the procedure has been photographed/videotaped, the surgeon will obtain the original picture, image, videotape or CD.   The hospital will not be responsible for storage, release or maintenance of the picture, image, tape or CD.    7.   I consent to the presence of a  or observers in the operating room as deemed necessary by my physician or their designees. 8.   I recognize that in the event my procedure results in extended X-Ray/fluoroscopy time, I may develop a skin reaction. 9. If I have a Do Not Attempt Resuscitation (DNAR) order in place, that status will be suspended while in the operating room, procedural suite, and during the recovery period unless otherwise explicitly stated by me (or a person authorized to consent on my behalf). The surgeon or my attending physician will determine when the applicable recovery period ends for purposes of reinstating the DNAR order. 10. Patients having a sterilization procedure: I understand that if the procedure is successful the results will be permanent and it will therefore be impossible for me to inseminate, conceive, or bear children. I also understand that the procedure is intended to result in sterility, although the result has not been guaranteed. 11. I acknowledge that my physician has explained sedation/analgesia administration to me including the risk and benefits I consent to the administration of sedation/analgesia as may be necessary or desirable in the judgment of my physician.     I CERTIFY THAT I HAVE READ AND FULLY UNDERSTAND THE ABOVE CONSENT TO OPERATION and/or OTHER PROCEDURE.        ____________________________________       _________________________________      ______________________________  Signature of Patient         Signature of Responsible Person        Printed Name of Responsible Person    ____________________________________      _________________________________      ______________________________       Signature of Witness          Relationship to Patient                       Date                                       Time  Patient Name: Pako Balloon     : 1945                 Printed: 2023      Medical Record #: [id number] Page 1 of 2           STATEMENT OF PHYSICIAN My signature below affirms that prior to the time of the procedure; I have explained to the patient and/or his/her legal representative, the risks and benefits involved in the proposed treatment and any reasonable alternative to the proposed treatment. I have also explained the risks and benefits involved in refusal of the proposed treatment and alternatives to the proposed treatment and have answered the patient's questions.  If I have a significant financial interest in a co-management agreement or a significant financial interest in any product or implant, or other significant relationship used in this procedure/surgery, I have disclosed this and had a discussion with my patient.     _______________________________________________________________ _____________________________  Cortez Mccoy of Physician)                                                                                         (Date)                                   (Time)  Patient Name: Cecelia Harada     : 1945                 Printed: 2023      Medical Record #: [id number]                      Page 2 of 2

## (undated) NOTE — IP AVS SNAPSHOT
Patient Demographics     Address  [address] [place], [place] [address]  [facility] [address] Phone  [phone] [facility])  [phone] (Mobile) *Preferred*      Emergency Contact(s)     Name Relation Home Work Mobile    Catia Daughter   [phone]    Boni Kadi Next dose due:  Last dose given at noon. Next dose at 4 pm.  Notes to patient:  Given every 4 hours. Take 2 tablets by mouth 4 (four) times daily. cetirizine 10 MG Tabs  Commonly known as:  ZYRTEC  Next dose due:   Tomorrow morning      Take 1 Start taking on:  July 4, 2020  Next dose due: Tomorrow morning      Take 0.5 tablets (12.5 mg total) by mouth Daily Beta Blocker.  Hold for SBP<90   Zee Partida MD         Midodrine HCl 10 MG Tabs  Commonly known as:  PROAMATINE  Next dose due:  Last dose g [id number] Vitamin C tab 500 mg 07/03/20 0958 Given      [id number] acetaminophen (TYLENOL) tab 650 mg 07/03/20 1250 Given      [id number] acetaminophen (TYLENOL) tab 650 mg (Or Linked Group #1) 07/02/20 1618 Given      [id number] atorvastatin (LIPITOR) tab 8 [id number] Insulin Aspart Pen (NOVOLOG) 100 UNIT/ML flexpen 1-7 Units 07/02/20 1803 Given      [id number] insulin detemir (LEVEMIR) 100 UNIT/ML flextouch 5 Units 07/02/20 2307 Given            RIGHT UPPER ARM     Order ID Medication Name Action Time Action MAGNESIUM [[id number]] (Normal)  Resulted: 07/03/20 0525, Result status: Final result   Ordering provider:  Shawna Osgood, MD  07/02/20 2300 Resulting lab:  [facility])    Specimen Information    Type Source Collected On :  Evie George MD (Physician)       Wolfgang Palencia    PATIENT'S NAME: Radonna Pump   ATTENDING PHYSICIAN: Terrence Bradford MD   PATIENT ACCOUNT#:   [id number]    LOCATION:  [place] [address]  MEDICAL RECORD #:   [id number]       DA lumbar spine with lumbar spinal stenosis; hypothyroidism; hypertension; hyperlipidemia.   He had history of DVT of left subclavian vein and then another DVT a few months ago to the right lower extremity per the patient's report, currently anticoagulated wit hard and soft palate. Eyes:  Anicteric sclerae. Pupils equal, round, reactive. NECK:  Supple. No lymphadenopathy. Trachea midline. Full range of motion. LUNGS:  Clear to auscultation bilaterally. Normal respiratory effort.   No intercostal retractio PATIENT'S NAME: [facility]   ATTENDING PHYSICIAN: Susanna Billings MD   CONSULTING PHYSICIAN: Kasandra Sanders MD   PATIENT ACCOUNT#:   [id number]    LOCATION:  [address] [place] [address] 118 RECORD #:   [id number]       YOB: 1945  ADMIS Lasix p.r.n., gabapentin, Mucinex, NovoLog, Levemir, melatonin, Toprol-XL 12.5 mg daily, midodrine 10 mg t.i.d., Remeron, Singulair, Lyrica, Incruse Ellipta, and vitamin C. ALLERGIES:  He is allergic to Ancef. SOCIAL HISTORY:  Nonsmoker.   [facility]ives at [facility].  His blood pressure though has also dropped. This may be resulting in decreased renal perfusion. 2.   Acute cerebrovascular accident with left-sided hemiparesis, which is slowly improving.   3.   History of coronary artery disease, status post stent Symptomatic bradycardia s/p PPM      History of Present Illness     Per admission H&P:    The patient is a 47-year-old  male who lives in a nursing facility noted today by nursing staff to have a left facial droop.   The patient said he noted that H/o orthostatic hypotension 2/2 autonomic dysfunction  -con't midodrine - dose increased 7/1  -BP improved     Acute blood loss anemia  -2/2 R groin hematoma  -hgb stable today     ARSLAN on CKD - baseline Cr seems to be 1.6-1.8. cr improved today.   -? Contra Instructions Prescription details   finasteride 5 MG Tabs  Commonly known as:  PROSCAR  Start taking on:  July 4, 2020      Take 1 tablet (5 mg total) by mouth daily.    Refills:  0     iron sucrose 20 MG/ML Soln  Commonly known as:  VENOFER  Start taking What changed:    · medication strength  · how much to take      Take 1 capsule (150 mg total) by mouth 3 (three) times daily.    Refills:  0        CONTINUE taking these medications      Instructions Prescription details   apixaban 2.5 MG Tabs  Commonly kno STOP taking these medications    furosemide 20 MG Tabs  Commonly known as:  LASIX        furosemide 40 MG Tabs  Commonly known as:  LASIX        ibuprofen 100 MG/5ML Susp  Commonly known as:  MOTRIN              Where to Get Your Medications      Please pi Active Problems:    Elevated troponin    ARSLAN (acute kidney injury) ([facility] [address]    Blood loss anemia      PHYSICAL THERAPY ASSESSMENT     Pt education with therex in chair and SBA with RW with all transfers.  Pt education with gait progression 400' with a step thr -   Turning over in bed (including adjusting bedclothes, sheets and blankets)?: None   -   Sitting down on and standing up from a chair with arms (e.g., wheelchair, bedside commode, etc.): None   -   Moving from lying on back to sitting on the side of the Current Status     Goal #5 Patient to demonstrate independence with home activity/exercise instructions provided to patient in preparation for discharge. Goal #5   Current Status In progress[DF.1]            Attribution Small    DF. 1 - Mykel Nunes PT, Presenting Problem: CVA    Problem List  Principal Problem:    Acute CVA (cerebrovascular accident) ([facility] [address]  Active Problems:    Elevated troponin    ARSLAN (acute kidney injury) ([facility] [address]    Blood loss anemia      OCCUPATIONAL THERAPY ASSESSMENT     Pt seen up in ACTIVITY TOLERANCE                         O2 SATURATIONS                ACTIVITIES OF DAILY LIVING ASSESSMENT  AM-PAC ‘6-Clicks’ Inpatient Daily Activity Short Form  How much help from another person does the patient currently need…  -   Putting on and ta  Comment: pt tolerated standing 5 min with CGA , RW for support    Patient will complete item retrieval with mod I  Comment: CGA with short ambulation and with increased time    Patient will complete L UE AROM exercises, 10 reps x 2 sets in all planes LUE patients with this level of impairment may benefit from acute rehab to max I with ADLS and to max safety with transfers and ambulation with RW for support. Pt remained up in chair with alarm set, all needs within reach.     DISCHARGE RECOMMENDATIONS  OT D in room with RW for support, CGA and chair follow for encouragement     BALANCE ASSESSMENT  Static Sitting: good  Dynamic Sitting: good  Static Standing: fair+  Dynamic Standing: fair    FUNCTIONAL ADL ASSESSMENT  Grooming: min assist  Feeding: NT  Bathing with pt next date as pt is appropriate. [BA.1]     Attribution Small    BA. 1 - Donna Wise on 7/1/2020 11:24 AM               Occupational Therapy Note signed by Amy Hudson OT at 6/30/2020  2:39 PM  Version 1 of 1    Author:  Amy Hudson OT Service: clinical signs/symptoms of aspiration. Oxygen status remained >99 t/o the entire session. Pt trialed with thin liquids via small sips from open cup and overt signs/symptoms of aspiration observed as evidenced by mild throat clearing.  Trials discontinued at Goal #1 The patient will tolerate solid consistency and thin liquids without overt signs or symptoms of aspiration with '100 % accuracy over 3 session(s). Consistent coughing with thin liquids. Recommend downgrade.     New goal:    Pt will tolerate genera exaggerated articulation, pausing between words) during repetition of 5-6 word functional sentences with 90% accuracy within 4 sessions.       Strategies reviewed with pt and pt demonstrating use of slow/exaggerated speech x5 words with minimal cues and 100

## (undated) NOTE — LETTER
[address], [place]  Authorization for Invasive Procedures  Date: 11/22/23           Time: 0800    I hereby authorize Dr Gertrude Garner, my physician and his/her assistants (if applicable), which may include medical students, residents, and/or fellows, to perform the following surgical operation/ procedure and administer such anesthesia as may be determined necessary by my physician: Bronchoscopy on [address]  2. I recognize that during the surgical operation/procedure, unforeseen conditions may necessitate additional or different procedures than those listed above. I, therefore, further authorize and request that the above-named surgeon, assistants, or designees perform such procedures as are, in their judgment, necessary and desirable. 3.   My surgeon/physician has discussed prior to my surgery the potential benefits, risks and side effects of this procedure; the likelihood of achieving goals; and potential problems that might occur during recuperation. They also discussed reasonable alternatives to the procedure, including risks, benefits, and side effects related to the alternatives and risks related to not receiving this procedure. I have had all my questions answered and I acknowledge that no guarantee has been made as to the result that may be obtained. 4.   Should the need arise during my operation/procedure, which includes change of level of care prior to discharge, I also consent to the administration of blood and/or blood products. Further, I understand that despite careful testing and screening of blood or blood products by collecting agencies, I may still be subject to ill effects as a result of receiving a blood transfusion and/or blood products.   The following are some, but not all, of the potential risks that can occur: fever and allergic reactions, hemolytic reactions, transmission of diseases such as Hepatitis, AIDS and Cytomegalovirus (CMV) and fluid overload. In the event that I wish to have an autologous transfusion of my own blood, or a directed donor transfusion, I will discuss this with my physician. Check only if Refusing Blood or Blood Products  I understand refusal of blood or blood products as deemed necessary by my physician may have serious consequences to my condition to include possible death. I hereby assume responsibility for my refusal and release the hospital, its personnel, and my physicians from any responsibility for the consequences of my refusal.         o  Refuse         5. I authorize the use of any specimen, organs, tissues, body parts or foreign objects that may be removed from my body during the operation/procedure for diagnosis, research or teaching purposes and their subsequent disposal by hospital authorities. I also authorize the release of specimen test results and/or written reports to my treating physician on the hospital medical staff or other referring or consulting physicians involved in my care, at the discretion of the Pathologist or my treating physician. 6.   I consent to the photographing or videotaping of the operations or procedures to be performed, including appropriate portions of my body for medical, scientific, or educational purposes, provided my identity is not revealed by the pictures or by descriptive texts accompanying them. If the procedure has been photographed/videotaped, the surgeon will obtain the original picture, image, videotape or CD. The hospital will not be responsible for storage, release or maintenance of the picture, image, tape or CD.    7.   I consent to the presence of a  or observers in the operating room as deemed necessary by my physician or their designees. 8.   I recognize that in the event my procedure results in extended X-Ray/fluoroscopy time, I may develop a skin reaction. 9.  If I have a Do Not Attempt Resuscitation (DNAR) order in place, that status will be suspended while in the operating room, procedural suite, and during the recovery period unless otherwise explicitly stated by me (or a person authorized to consent on my behalf). The surgeon or my attending physician will determine when the applicable recovery period ends for purposes of reinstating the DNAR order. 10. Patients having a sterilization procedure: I understand that if the procedure is successful the results will be permanent and it will therefore be impossible for me to inseminate, conceive, or bear children. I also understand that the procedure is intended to result in sterility, although the result has not been guaranteed. 11. I acknowledge that my physician has explained sedation/analgesia administration to me including the risk and benefits I consent to the administration of sedation/analgesia as may be necessary or desirable in the judgment of my physician. I CERTIFY THAT I HAVE READ AND FULLY UNDERSTAND THE ABOVE CONSENT TO OPERATION and/or OTHER PROCEDURE.        ____________________________________       _________________________________      ______________________________  Signature of Patient         Signature of Responsible Person        Printed Name of Responsible Person        ____________________________________      _________________________________      ______________________________       Signature of Witness          Relationship to Patient                       Date                                       Time    Patient Name: Denis Arroyo     : 1945                 Printed: 2023      Medical Record #: [id number]                      Page 1 of 2          [place] My signature below affirms that prior to the time of the procedure; I have explained to the patient and/or his/her legal representative, the risks and benefits involved in the proposed treatment and any reasonable alternative to the proposed treatment.  I have also explained the risks and benefits involved in refusal of the proposed treatment and alternatives to the proposed treatment and have answered the patient's questions.  If I have a significant financial interest in a co-management agreement or a significant financial interest in any product or implant, or other significant relationship used in this procedure/surgery, I have disclosed this and had a discussion with my patient.     _______________________________________________________________ _____________________________  Bud Orf of Physician)                                                                                         (Date)                                   (Time)    Patient Name: Usama Ybarra     : 1945                 Printed: 2023      Medical Record #: [id number]                      Page 2 of 2

## (undated) NOTE — LETTER
[address] [facility] [place]  Authorization for Invasive Procedures  Date: 11/22/23           Time: 0800    I hereby authorize Dr German Carter, my physician and his/her assistants (if applicable), which may include medical students, residents, and/or fellows, to perform the following surgical operation/ procedure and administer such anesthesia as may be determined necessary by my physician: [address] [place] [address] on [address]  2. I recognize that during the surgical operation/procedure, unforeseen conditions may necessitate additional or different procedures than those listed above. I, therefore, further authorize and request that the above-named surgeon, assistants, or designees perform such procedures as are, in their judgment, necessary and desirable. 3.   My surgeon/physician has discussed prior to my surgery the potential benefits, risks and side effects of this procedure; the likelihood of achieving goals; and potential problems that might occur during recuperation. They also discussed reasonable alternatives to the procedure, including risks, benefits, and side effects related to the alternatives and risks related to not receiving this procedure. I have had all my questions answered and I acknowledge that no guarantee has been made as to the result that may be obtained. 4.   Should the need arise during my operation/procedure, which includes change of level of care prior to discharge, I also consent to the administration of blood and/or blood products. Further, I understand that despite careful testing and screening of blood or blood products by collecting agencies, I may still be subject to ill effects as a result of receiving a blood transfusion and/or blood products.   The following are some, but not all, of the potential risks that can occur: fever and allergic reactions, hemolytic reactions, transmission of diseases such as Hepatitis, AIDS and Cytomegalovirus (CMV) and fluid overload. In the event that I wish to have an autologous transfusion of my own blood, or a directed donor transfusion, I will discuss this with my physician. Check only if Refusing Blood or Blood Products  I understand refusal of blood or blood products as deemed necessary by my physician may have serious consequences to my condition to include possible death. I hereby assume responsibility for my refusal and release the hospital, its personnel, and my physicians from any responsibility for the consequences of my refusal.         o  Refuse         5. I authorize the use of any specimen, organs, tissues, body parts or foreign objects that may be removed from my body during the operation/procedure for diagnosis, research or teaching purposes and their subsequent disposal by hospital authorities. I also authorize the release of specimen test results and/or written reports to my treating physician on the hospital medical staff or other referring or consulting physicians involved in my care, at the discretion of the Pathologist or my treating physician. 6.   I consent to the photographing or videotaping of the operations or procedures to be performed, including appropriate portions of my body for medical, scientific, or educational purposes, provided my identity is not revealed by the pictures or by descriptive texts accompanying them. If the procedure has been photographed/videotaped, the surgeon will obtain the original picture, image, videotape or CD. The hospital will not be responsible for storage, release or maintenance of the picture, image, tape or CD.    7.   I consent to the presence of a  or observers in the operating room as deemed necessary by my physician or their designees. 8.   I recognize that in the event my procedure results in extended X-Ray/fluoroscopy time, I may develop a skin reaction. 9.  If I have a Do Not Attempt Resuscitation (DNAR) order in place, that status will be suspended while in the operating room, procedural suite, and during the recovery period unless otherwise explicitly stated by me (or a person authorized to consent on my behalf). The surgeon or my attending physician will determine when the applicable recovery period ends for purposes of reinstating the DNAR order. 10. Patients having a sterilization procedure: I understand that if the procedure is successful the results will be permanent and it will therefore be impossible for me to inseminate, conceive, or bear children. I also understand that the procedure is intended to result in sterility, although the result has not been guaranteed. 11. I acknowledge that my physician has explained sedation/analgesia administration to me including the risk and benefits I consent to the administration of sedation/analgesia as may be necessary or desirable in the judgment of my physician. I CERTIFY THAT I HAVE READ AND FULLY UNDERSTAND THE ABOVE CONSENT TO OPERATION and/or OTHER PROCEDURE.        ____________________________________       _________________________________      ______________________________  Signature of Patient         Signature of Responsible Person        Printed Name of Responsible Person        ____________________________________      _________________________________      ______________________________       Signature of Witness          Relationship to Patient                       Date                                       Time    Patient Name: Jose Luis Bernal     : 1945                 Printed: 2023      Medical Record #: [id number]                      Page 1 of 2          [place] My signature below affirms that prior to the time of the procedure; I have explained to the patient and/or his/her legal representative, the risks and benefits involved in the proposed treatment and any reasonable alternative to the proposed treatment.  I have also explained the risks and benefits involved in refusal of the proposed treatment and alternatives to the proposed treatment and have answered the patient's questions.  If I have a significant financial interest in a co-management agreement or a significant financial interest in any product or implant, or other significant relationship used in this procedure/surgery, I have disclosed this and had a discussion with my patient.     _______________________________________________________________ _____________________________  María Henderson of Physician)                                                                                         (Date)                                   (Time)    Patient Name: Sim Lopez     : 1945                 Printed: 2023      Medical Record #: [id number]                      Page 2 of 2